# Patient Record
Sex: FEMALE | Race: WHITE | NOT HISPANIC OR LATINO | Employment: OTHER | URBAN - METROPOLITAN AREA
[De-identification: names, ages, dates, MRNs, and addresses within clinical notes are randomized per-mention and may not be internally consistent; named-entity substitution may affect disease eponyms.]

---

## 2018-05-07 ENCOUNTER — HOSPITAL ENCOUNTER (OUTPATIENT)
Dept: FAMILY MEDICINE CLINIC | Facility: CLINIC | Age: 63
Setting detail: SPECIMEN
Discharge: HOME | End: 2018-05-07
Attending: FAMILY MEDICINE | Admitting: FAMILY MEDICINE

## 2018-05-07 LAB
ALBUMIN SERPL-MCNC: 3.8 G/DL (ref 3.5–4.8)
ALBUMIN/GLOB SERPL: 1.6 {RATIO} (ref 1–1.7)
ALP SERPL-CCNC: 62 IU/L (ref 32–91)
ALT SERPL-CCNC: 21 IU/L (ref 14–54)
ANION GAP SERPL CALC-SCNC: 12.7 MMOL/L (ref 10–20)
AST SERPL-CCNC: 25 IU/L (ref 15–41)
BASOPHILS # BLD AUTO: 0 10*3/UL (ref 0–0.2)
BASOPHILS NFR BLD AUTO: 0 % (ref 0–2)
BILIRUB SERPL-MCNC: 0.4 MG/DL (ref 0.3–1.2)
BUN SERPL-MCNC: 9 MG/DL (ref 8–20)
BUN/CREAT SERPL: 15 (ref 5.4–26.2)
CALCIUM SERPL-MCNC: 8.9 MG/DL (ref 8.9–10.3)
CHLORIDE SERPL-SCNC: 103 MMOL/L (ref 101–111)
CHOLEST SERPL-MCNC: 227 MG/DL
CHOLEST/HDLC SERPL: 4.1 {RATIO}
CONV CO2: 26 MMOL/L (ref 22–32)
CONV LDL CHOLESTEROL DIRECT: 145 MG/DL (ref 0–100)
CONV TOTAL PROTEIN: 6.2 G/DL (ref 6.1–7.9)
CREAT UR-MCNC: 0.6 MG/DL (ref 0.4–1)
DIFFERENTIAL METHOD BLD: (no result)
EOSINOPHIL # BLD AUTO: 0.1 10*3/UL (ref 0–0.3)
EOSINOPHIL # BLD AUTO: 2 % (ref 0–3)
ERYTHROCYTE [DISTWIDTH] IN BLOOD BY AUTOMATED COUNT: 14.2 % (ref 11.5–14.5)
GLOBULIN UR ELPH-MCNC: 2.4 G/DL (ref 2.5–3.8)
GLUCOSE SERPL-MCNC: 96 MG/DL (ref 65–99)
HCT VFR BLD AUTO: 42.7 % (ref 35–49)
HDLC SERPL-MCNC: 56 MG/DL
HGB BLD-MCNC: 13.9 G/DL (ref 12–15)
LDLC/HDLC SERPL: 2.6 {RATIO}
LIPID INTERPRETATION: ABNORMAL
LYMPHOCYTES # BLD AUTO: 2.7 10*3/UL (ref 0.8–4.8)
LYMPHOCYTES NFR BLD AUTO: 33 % (ref 18–42)
MCH RBC QN AUTO: 28.9 PG (ref 26–32)
MCHC RBC AUTO-ENTMCNC: 32.6 G/DL (ref 32–36)
MCV RBC AUTO: 88.6 FL (ref 80–94)
MONOCYTES # BLD AUTO: 0.6 10*3/UL (ref 0.1–1.3)
MONOCYTES NFR BLD AUTO: 7 % (ref 2–11)
NEUTROPHILS # BLD AUTO: 4.7 10*3/UL (ref 2.3–8.6)
NEUTROPHILS NFR BLD AUTO: 58 % (ref 50–75)
NRBC BLD AUTO-RTO: 0 /100{WBCS}
NRBC/RBC NFR BLD MANUAL: 0 10*3/UL
PLATELET # BLD AUTO: 257 10*3/UL (ref 150–450)
PMV BLD AUTO: 10.3 FL (ref 7.4–10.4)
POTASSIUM SERPL-SCNC: 3.7 MMOL/L (ref 3.6–5.1)
RBC # BLD AUTO: 4.81 10*6/UL (ref 4–5.4)
SODIUM SERPL-SCNC: 138 MMOL/L (ref 136–144)
TRIGL SERPL-MCNC: 183 MG/DL
VLDLC SERPL CALC-MCNC: 26.7 MG/DL
WBC # BLD AUTO: 8.2 10*3/UL (ref 4.5–11.5)

## 2019-01-31 ENCOUNTER — HOSPITAL ENCOUNTER (OUTPATIENT)
Dept: FAMILY MEDICINE CLINIC | Facility: CLINIC | Age: 64
Setting detail: SPECIMEN
Discharge: HOME | End: 2019-01-31
Attending: FAMILY MEDICINE | Admitting: FAMILY MEDICINE

## 2019-01-31 LAB
BASOPHILS # BLD AUTO: 0.1 10*3/UL (ref 0–0.2)
BASOPHILS NFR BLD AUTO: 1 % (ref 0–2)
DIFFERENTIAL METHOD BLD: (no result)
EOSINOPHIL # BLD AUTO: 0.6 10*3/UL (ref 0–0.3)
EOSINOPHIL # BLD AUTO: 7 % (ref 0–3)
ERYTHROCYTE [DISTWIDTH] IN BLOOD BY AUTOMATED COUNT: 14.2 % (ref 11.5–14.5)
HCT VFR BLD AUTO: 37.8 % (ref 35–49)
HGB BLD-MCNC: 12.5 G/DL (ref 12–15)
LYMPHOCYTES # BLD AUTO: 1.9 10*3/UL (ref 0.8–4.8)
LYMPHOCYTES NFR BLD AUTO: 21 % (ref 18–42)
MCH RBC QN AUTO: 29.1 PG (ref 26–32)
MCHC RBC AUTO-ENTMCNC: 33.1 G/DL (ref 32–36)
MCV RBC AUTO: 87.9 FL (ref 80–94)
MONOCYTES # BLD AUTO: 0.6 10*3/UL (ref 0.1–1.3)
MONOCYTES NFR BLD AUTO: 7 % (ref 2–11)
NEUTROPHILS # BLD AUTO: 5.9 10*3/UL (ref 2.3–8.6)
NEUTROPHILS NFR BLD AUTO: 64 % (ref 50–75)
NRBC BLD AUTO-RTO: 0 /100{WBCS}
NRBC/RBC NFR BLD MANUAL: 0 10*3/UL
PLATELET # BLD AUTO: 332 10*3/UL (ref 150–450)
PMV BLD AUTO: 9.6 FL (ref 7.4–10.4)
RBC # BLD AUTO: 4.31 10*6/UL (ref 4–5.4)
WBC # BLD AUTO: 9.2 10*3/UL (ref 4.5–11.5)

## 2019-08-07 RX ORDER — LOVASTATIN 20 MG/1
TABLET ORAL
Qty: 180 TABLET | Refills: 0 | Status: SHIPPED | OUTPATIENT
Start: 2019-08-07 | End: 2021-01-22

## 2019-08-07 RX ORDER — LISINOPRIL AND HYDROCHLOROTHIAZIDE 20; 12.5 MG/1; MG/1
TABLET ORAL
Qty: 180 TABLET | Refills: 0 | Status: SHIPPED | OUTPATIENT
Start: 2019-08-07 | End: 2021-01-22

## 2019-08-19 ENCOUNTER — OFFICE VISIT (OUTPATIENT)
Dept: FAMILY MEDICINE CLINIC | Facility: CLINIC | Age: 64
End: 2019-08-19

## 2019-08-19 VITALS
TEMPERATURE: 98.1 F | HEIGHT: 64 IN | OXYGEN SATURATION: 97 % | DIASTOLIC BLOOD PRESSURE: 63 MMHG | HEART RATE: 87 BPM | BODY MASS INDEX: 24.75 KG/M2 | WEIGHT: 145 LBS | SYSTOLIC BLOOD PRESSURE: 98 MMHG

## 2019-08-19 DIAGNOSIS — J06.9 UPPER RESPIRATORY TRACT INFECTION, UNSPECIFIED TYPE: Primary | ICD-10-CM

## 2019-08-19 PROBLEM — G89.4 CHRONIC PAIN DISORDER: Status: ACTIVE | Noted: 2017-01-11

## 2019-08-19 PROBLEM — N62 MACROMASTIA: Status: ACTIVE | Noted: 2017-05-17

## 2019-08-19 PROBLEM — G47.00 INSOMNIA: Status: ACTIVE | Noted: 2017-01-11

## 2019-08-19 PROBLEM — M79.644 THUMB PAIN, RIGHT: Status: ACTIVE | Noted: 2017-01-11

## 2019-08-19 PROBLEM — K21.9 GASTROESOPHAGEAL REFLUX DISEASE: Status: ACTIVE | Noted: 2019-01-31

## 2019-08-19 PROCEDURE — 99213 OFFICE O/P EST LOW 20 MIN: CPT | Performed by: NURSE PRACTITIONER

## 2019-08-19 RX ORDER — SUCRALFATE 1 G/1
TABLET ORAL
COMMUNITY
Start: 2019-01-31 | End: 2021-01-22

## 2019-08-19 RX ORDER — FLUTICASONE PROPIONATE 50 MCG
2 SPRAY, SUSPENSION (ML) NASAL DAILY
Qty: 1 BOTTLE | Refills: 2 | Status: SHIPPED | OUTPATIENT
Start: 2019-08-19 | End: 2021-01-22

## 2019-08-19 RX ORDER — BENZONATATE 100 MG/1
100 CAPSULE ORAL 3 TIMES DAILY PRN
Qty: 30 CAPSULE | Refills: 0 | Status: SHIPPED | OUTPATIENT
Start: 2019-08-19 | End: 2021-01-22

## 2019-08-19 RX ORDER — OMEPRAZOLE 20 MG/1
CAPSULE, DELAYED RELEASE ORAL
COMMUNITY
Start: 2019-01-31 | End: 2021-01-22

## 2019-08-19 RX ORDER — MOMETASONE FUROATE 1 MG/G
CREAM TOPICAL
COMMUNITY
Start: 2019-01-31 | End: 2021-01-22

## 2019-08-19 NOTE — PATIENT INSTRUCTIONS
Most likely a viral upper respiratory infection   Tylenol or ibuprofen as needed for discomfort  Gargle with salt water  May take over the counter mucinex  Tessalon perles as needed for cough  Start using flonase nasal spray daily  Call for any issues or concerns  Call Felisha at [phone], option 3, then option 1 in 1-2 weeks with BP readings

## 2019-08-19 NOTE — PROGRESS NOTES
"Subjective   Katarina Phillips is a 64 y.o. female.     Pt is here today with c/o sore throat for 3-4 weeks. She states that it has been inflamed and has been very hard to swallow.  She reports that she watched her grand daughter this weekend and she was sick.  Pt reports that she has also been congested and has a severe cough.  The only thing that she has been taking is cough drops.  Her nose started running last night so she did try a decongestant.  Denies fever or chills.           The following portions of the patient's history were reviewed and updated as appropriate: allergies, current medications, past family history, past medical history, past social history, past surgical history and problem list.    Review of Systems   Constitutional: Positive for fatigue. Negative for chills and fever.   HENT: Positive for congestion, sore throat, swollen glands, trouble swallowing and voice change. Negative for ear pain and sinus pressure.    Respiratory: Positive for cough. Negative for chest tightness and shortness of breath.    Cardiovascular: Negative for chest pain and palpitations.   Gastrointestinal: Negative for abdominal pain, diarrhea, nausea and vomiting.   Neurological: Positive for dizziness. Negative for headache.       Objective   BP 98/63 (BP Location: Right arm, Patient Position: Sitting, Cuff Size: Adult)   Pulse 87   Temp 98.1 °F (36.7 °C) (Oral)   Ht 162.6 cm (64\")   Wt 65.8 kg (145 lb)   SpO2 97%   BMI 24.89 kg/m²   Physical Exam   Constitutional: She is oriented to person, place, and time. She appears well-developed and well-nourished.   HENT:   Head: Normocephalic and atraumatic.   Mouth/Throat: Posterior oropharyngeal erythema present. No oropharyngeal exudate or posterior oropharyngeal edema.   Hoarse voice   Eyes: EOM are normal. Pupils are equal, round, and reactive to light.   Neck: Normal range of motion. Neck supple.   Cardiovascular: Normal rate and regular rhythm. "   Pulmonary/Chest: Effort normal and breath sounds normal. No respiratory distress. She has no wheezes. She exhibits no tenderness.   Abdominal: Soft. Bowel sounds are normal.   Musculoskeletal: Normal range of motion.   Lymphadenopathy:     She has cervical adenopathy.   Neurological: She is alert and oriented to person, place, and time.   Skin: Skin is warm.   Psychiatric: She has a normal mood and affect. Her behavior is normal. Judgment and thought content normal.         Assessment/Plan   Problems Addressed this Visit     None      Visit Diagnoses     Upper respiratory tract infection, unspecified type    -  Primary    Most likely a viral upper respiratory infection   Gargle with salt water  mucinex  Tessalon perles  flonase      Relevant Medications    fluticasone (FLONASE) 50 MCG/ACT nasal spray    benzonatate (TESSALON PERLES) 100 MG capsule              Diagnoses and all orders for this visit:    1. Upper respiratory tract infection, unspecified type (Primary)  Comments:  Most likely a viral upper respiratory infection   Gargle with salt water  mucinex  Tessalon perles  flonase    Orders:  -     fluticasone (FLONASE) 50 MCG/ACT nasal spray; 2 sprays into the nostril(s) as directed by provider Daily.  Dispense: 1 bottle; Refill: 2  -     benzonatate (TESSALON PERLES) 100 MG capsule; Take 1 capsule by mouth 3 (Three) Times a Day As Needed for Cough.  Dispense: 30 capsule; Refill: 0

## 2019-08-23 ENCOUNTER — TELEPHONE (OUTPATIENT)
Dept: FAMILY MEDICINE CLINIC | Facility: CLINIC | Age: 64
End: 2019-08-23

## 2019-08-23 RX ORDER — AMOXICILLIN AND CLAVULANATE POTASSIUM 875; 125 MG/1; MG/1
1 TABLET, FILM COATED ORAL 2 TIMES DAILY
Qty: 20 TABLET | Refills: 0 | Status: SHIPPED | OUTPATIENT
Start: 2019-08-23 | End: 2019-09-02

## 2021-01-22 ENCOUNTER — LAB (OUTPATIENT)
Dept: FAMILY MEDICINE CLINIC | Facility: CLINIC | Age: 66
End: 2021-01-22

## 2021-01-22 ENCOUNTER — OFFICE VISIT (OUTPATIENT)
Dept: FAMILY MEDICINE CLINIC | Facility: CLINIC | Age: 66
End: 2021-01-22

## 2021-01-22 VITALS
HEIGHT: 64 IN | TEMPERATURE: 97.7 F | OXYGEN SATURATION: 98 % | SYSTOLIC BLOOD PRESSURE: 163 MMHG | BODY MASS INDEX: 27.31 KG/M2 | HEART RATE: 63 BPM | WEIGHT: 160 LBS | DIASTOLIC BLOOD PRESSURE: 72 MMHG

## 2021-01-22 DIAGNOSIS — E78.2 MIXED HYPERLIPIDEMIA: Primary | ICD-10-CM

## 2021-01-22 DIAGNOSIS — Z11.59 NEED FOR HEPATITIS C SCREENING TEST: ICD-10-CM

## 2021-01-22 DIAGNOSIS — R63.5 WEIGHT GAIN: ICD-10-CM

## 2021-01-22 DIAGNOSIS — F34.1 DYSTHYMIA: ICD-10-CM

## 2021-01-22 DIAGNOSIS — I10 ESSENTIAL HYPERTENSION: ICD-10-CM

## 2021-01-22 DIAGNOSIS — E78.2 MIXED HYPERLIPIDEMIA: ICD-10-CM

## 2021-01-22 LAB
ALBUMIN SERPL-MCNC: 3.9 G/DL (ref 3.5–5.2)
ALBUMIN/GLOB SERPL: 1.6 G/DL
ALP SERPL-CCNC: 81 U/L (ref 39–117)
ALT SERPL W P-5'-P-CCNC: 21 U/L (ref 1–33)
ANION GAP SERPL CALCULATED.3IONS-SCNC: 8.1 MMOL/L (ref 5–15)
AST SERPL-CCNC: 20 U/L (ref 1–32)
BASOPHILS # BLD AUTO: 0.07 10*3/MM3 (ref 0–0.2)
BASOPHILS NFR BLD AUTO: 1 % (ref 0–1.5)
BILIRUB SERPL-MCNC: <0.2 MG/DL (ref 0–1.2)
BUN SERPL-MCNC: 18 MG/DL (ref 8–23)
BUN/CREAT SERPL: 28.6 (ref 7–25)
CALCIUM SPEC-SCNC: 8.9 MG/DL (ref 8.6–10.5)
CHLORIDE SERPL-SCNC: 104 MMOL/L (ref 98–107)
CHOLEST SERPL-MCNC: 202 MG/DL (ref 0–200)
CO2 SERPL-SCNC: 25.9 MMOL/L (ref 22–29)
CREAT SERPL-MCNC: 0.63 MG/DL (ref 0.57–1)
DEPRECATED RDW RBC AUTO: 40.9 FL (ref 37–54)
EOSINOPHIL # BLD AUTO: 0.25 10*3/MM3 (ref 0–0.4)
EOSINOPHIL NFR BLD AUTO: 3.5 % (ref 0.3–6.2)
ERYTHROCYTE [DISTWIDTH] IN BLOOD BY AUTOMATED COUNT: 13.2 % (ref 12.3–15.4)
GFR SERPL CREATININE-BSD FRML MDRD: 95 ML/MIN/1.73
GLOBULIN UR ELPH-MCNC: 2.4 GM/DL
GLUCOSE SERPL-MCNC: 95 MG/DL (ref 65–99)
HCT VFR BLD AUTO: 41.5 % (ref 34–46.6)
HCV AB SER DONR QL: NORMAL
HDLC SERPL-MCNC: 69 MG/DL (ref 40–60)
HGB BLD-MCNC: 13.5 G/DL (ref 12–15.9)
IMM GRANULOCYTES # BLD AUTO: 0.02 10*3/MM3 (ref 0–0.05)
IMM GRANULOCYTES NFR BLD AUTO: 0.3 % (ref 0–0.5)
LDLC SERPL CALC-MCNC: 112 MG/DL (ref 0–100)
LDLC/HDLC SERPL: 1.58 {RATIO}
LYMPHOCYTES # BLD AUTO: 2.15 10*3/MM3 (ref 0.7–3.1)
LYMPHOCYTES NFR BLD AUTO: 29.9 % (ref 19.6–45.3)
MCH RBC QN AUTO: 28.1 PG (ref 26.6–33)
MCHC RBC AUTO-ENTMCNC: 32.5 G/DL (ref 31.5–35.7)
MCV RBC AUTO: 86.3 FL (ref 79–97)
MONOCYTES # BLD AUTO: 0.89 10*3/MM3 (ref 0.1–0.9)
MONOCYTES NFR BLD AUTO: 12.4 % (ref 5–12)
NEUTROPHILS NFR BLD AUTO: 3.81 10*3/MM3 (ref 1.7–7)
NEUTROPHILS NFR BLD AUTO: 52.9 % (ref 42.7–76)
NRBC BLD AUTO-RTO: 0 /100 WBC (ref 0–0.2)
PLATELET # BLD AUTO: 235 10*3/MM3 (ref 140–450)
PMV BLD AUTO: 12.2 FL (ref 6–12)
POTASSIUM SERPL-SCNC: 4.4 MMOL/L (ref 3.5–5.2)
PROT SERPL-MCNC: 6.3 G/DL (ref 6–8.5)
RBC # BLD AUTO: 4.81 10*6/MM3 (ref 3.77–5.28)
SODIUM SERPL-SCNC: 138 MMOL/L (ref 136–145)
TRIGL SERPL-MCNC: 119 MG/DL (ref 0–150)
TSH SERPL DL<=0.05 MIU/L-ACNC: 2.66 UIU/ML (ref 0.27–4.2)
VLDLC SERPL-MCNC: 21 MG/DL (ref 5–40)
WBC # BLD AUTO: 7.19 10*3/MM3 (ref 3.4–10.8)

## 2021-01-22 PROCEDURE — 84443 ASSAY THYROID STIM HORMONE: CPT | Performed by: FAMILY MEDICINE

## 2021-01-22 PROCEDURE — 86803 HEPATITIS C AB TEST: CPT | Performed by: FAMILY MEDICINE

## 2021-01-22 PROCEDURE — 80053 COMPREHEN METABOLIC PANEL: CPT | Performed by: FAMILY MEDICINE

## 2021-01-22 PROCEDURE — 85025 COMPLETE CBC W/AUTO DIFF WBC: CPT | Performed by: FAMILY MEDICINE

## 2021-01-22 PROCEDURE — 99214 OFFICE O/P EST MOD 30 MIN: CPT | Performed by: FAMILY MEDICINE

## 2021-01-22 PROCEDURE — 80061 LIPID PANEL: CPT | Performed by: FAMILY MEDICINE

## 2021-01-22 PROCEDURE — 36415 COLL VENOUS BLD VENIPUNCTURE: CPT | Performed by: FAMILY MEDICINE

## 2021-01-22 RX ORDER — MELATONIN
1000 DAILY
COMMUNITY
End: 2021-12-17 | Stop reason: SDUPTHER

## 2021-01-22 RX ORDER — LANOLIN ALCOHOL/MO/W.PET/CERES
1000 CREAM (GRAM) TOPICAL DAILY
COMMUNITY
End: 2021-12-17 | Stop reason: SDUPTHER

## 2021-01-22 RX ORDER — AMLODIPINE BESYLATE 5 MG/1
5 TABLET ORAL DAILY
Qty: 90 TABLET | Refills: 3 | Status: SHIPPED | OUTPATIENT
Start: 2021-01-22 | End: 2021-02-18

## 2021-01-22 RX ORDER — ESCITALOPRAM OXALATE 10 MG/1
10 TABLET ORAL DAILY
Qty: 90 TABLET | Refills: 3 | Status: SHIPPED | OUTPATIENT
Start: 2021-01-22 | End: 2021-02-09

## 2021-01-22 NOTE — PROGRESS NOTES
Subjective   Katarina Phillips is a 65 y.o. female.     Here for follow up on bp and chol  Went off meds a year ago as she did not think her meds were working  Ready to restart  bp is high at work  Now has custody of her great-granddaughter  Stressful  Has gained weight  Needs antidepressants  Prev on zestorectic for her bp  Prev on prozac and it worked  Denies SI/HI       The following portions of the patient's history were reviewed and updated as appropriate: allergies, current medications, past family history, past medical history, past social history, past surgical history and problem list.  Past Medical History:   Diagnosis Date   • Hyperthyroidism      Past Surgical History:   Procedure Laterality Date   • BACK SURGERY     • HYSTERECTOMY       History reviewed. No pertinent family history.  Social History     Socioeconomic History   • Marital status:      Spouse name: Not on file   • Number of children: Not on file   • Years of education: Not on file   • Highest education level: Not on file   Tobacco Use   • Smoking status: Never Smoker   • Smokeless tobacco: Never Used   Substance and Sexual Activity   • Alcohol use: Not Currently         Current Outpatient Medications:   •  cholecalciferol (VITAMIN D3) 25 MCG (1000 UT) tablet, Take 1,000 Units by mouth Daily., Disp: , Rfl:   •  vitamin B-12 (CYANOCOBALAMIN) 1000 MCG tablet, Take 1,000 mcg by mouth Daily., Disp: , Rfl:   •  amLODIPine (NORVASC) 5 MG tablet, Take 1 tablet by mouth Daily., Disp: 90 tablet, Rfl: 3  •  escitalopram (Lexapro) 10 MG tablet, Take 1 tablet by mouth Daily., Disp: 90 tablet, Rfl: 3    Review of Systems   Constitutional: Positive for unexpected weight gain. Negative for diaphoresis, fatigue, fever and unexpected weight loss.   Respiratory: Negative for cough, chest tightness and shortness of breath.    Cardiovascular: Negative for chest pain, palpitations and leg swelling.   Gastrointestinal: Negative for nausea and vomiting.  "  Neurological: Negative for dizziness, syncope and headache.   Psychiatric/Behavioral: Positive for sleep disturbance, depressed mood and stress. Negative for self-injury and suicidal ideas. The patient is nervous/anxious.      /72 (BP Location: Left arm, Patient Position: Sitting, Cuff Size: Adult)   Pulse 63   Temp 97.7 °F (36.5 °C) (Temporal)   Ht 162.6 cm (64\")   Wt 72.6 kg (160 lb)   SpO2 98%   Breastfeeding No   BMI 27.46 kg/m²       Objective   Physical Exam  Vitals signs and nursing note reviewed.   Constitutional:       General: She is not in acute distress.     Appearance: She is well-developed and overweight.   HENT:      Head: Normocephalic and atraumatic.   Neck:      Musculoskeletal: Neck supple.      Thyroid: No thyromegaly.   Cardiovascular:      Rate and Rhythm: Normal rate and regular rhythm.      Heart sounds: Normal heart sounds. No murmur. No friction rub. No gallop.    Pulmonary:      Effort: Pulmonary effort is normal. No respiratory distress.      Breath sounds: Normal breath sounds. No wheezing or rales.   Musculoskeletal:      Right lower leg: No edema.      Left lower leg: No edema.   Lymphadenopathy:      Cervical: No cervical adenopathy.   Skin:     General: Skin is warm and dry.   Neurological:      Mental Status: She is alert.   Psychiatric:         Mood and Affect: Affect is flat.           Assessment/Plan   Problems Addressed this Visit        Cardiac and Vasculature    Hyperlipidemia - Primary    Relevant Orders    Comprehensive Metabolic Panel (Completed)    Lipid Panel (Completed)    Hypertension    Relevant Medications    amLODIPine (NORVASC) 5 MG tablet    Other Relevant Orders    Comprehensive Metabolic Panel (Completed)    Lipid Panel (Completed)    TSH (Completed)    CBC & Differential (Completed)       Mental Health    Dysthymia    Relevant Medications    escitalopram (Lexapro) 10 MG tablet      Other Visit Diagnoses     Need for hepatitis C screening test     "    Relevant Orders    Hepatitis C Antibody (Completed)    Weight gain        Relevant Orders    TSH (Completed)    CBC & Differential (Completed)      Diagnoses       Codes Comments    Mixed hyperlipidemia    -  Primary ICD-10-CM: E78.2  ICD-9-CM: 272.2     Essential hypertension     ICD-10-CM: I10  ICD-9-CM: 401.9     Need for hepatitis C screening test     ICD-10-CM: Z11.59  ICD-9-CM: V73.89     Weight gain     ICD-10-CM: R63.5  ICD-9-CM: 783.1     Dysthymia     ICD-10-CM: F34.1  ICD-9-CM: 300.4         Labs ordered  Counseled on the need for weight loss and stress reduction  Will restart zestoretic for her bp  She did well on prozac in the past but wants to try something new so will start lexapro  Counseled on sleep hygeine and gave a h/o on insomnia  Will see her back in a few weeks to f/u bp

## 2021-01-22 NOTE — PATIENT INSTRUCTIONS
Keep working to lose weight through healthy eating and exercise.  Stress reduction  Insomnia  Insomnia is a sleep disorder that makes it difficult to fall asleep or stay asleep. Insomnia can cause fatigue, low energy, difficulty concentrating, mood swings, and poor performance at work or school.  There are three different ways to classify insomnia:  · Difficulty falling asleep.  · Difficulty staying asleep.  · Waking up too early in the morning.  Any type of insomnia can be long-term (chronic) or short-term (acute). Both are common. Short-term insomnia usually lasts for three months or less. Chronic insomnia occurs at least three times a week for longer than three months.  What are the causes?  Insomnia may be caused by another condition, situation, or substance, such as:  · Anxiety.  · Certain medicines.  · Gastroesophageal reflux disease (GERD) or other gastrointestinal conditions.  · Asthma or other breathing conditions.  · Restless legs syndrome, sleep apnea, or other sleep disorders.  · Chronic pain.  · Menopause.  · Stroke.  · Abuse of alcohol, tobacco, or illegal drugs.  · Mental health conditions, such as depression.  · Caffeine.  · Neurological disorders, such as Alzheimer's disease.  · An overactive thyroid (hyperthyroidism).  Sometimes, the cause of insomnia may not be known.  What increases the risk?  Risk factors for insomnia include:  · Gender. Women are affected more often than men.  · Age. Insomnia is more common as you get older.  · Stress.  · Lack of exercise.  · Irregular work schedule or working night shifts.  · Traveling between different time zones.  · Certain medical and mental health conditions.  What are the signs or symptoms?  If you have insomnia, the main symptom is having trouble falling asleep or having trouble staying asleep. This may lead to other symptoms, such as:  · Feeling fatigued or having low energy.  · Feeling nervous about going to sleep.  · Not feeling rested in the  morning.  · Having trouble concentrating.  · Feeling irritable, anxious, or depressed.  How is this diagnosed?  This condition may be diagnosed based on:  · Your symptoms and medical history. Your health care provider may ask about:  ? Your sleep habits.  ? Any medical conditions you have.  ? Your mental health.  · A physical exam.  How is this treated?  Treatment for insomnia depends on the cause. Treatment may focus on treating an underlying condition that is causing insomnia. Treatment may also include:  · Medicines to help you sleep.  · Counseling or therapy.  · Lifestyle adjustments to help you sleep better.  Follow these instructions at home:  Eating and drinking    · Limit or avoid alcohol, caffeinated beverages, and cigarettes, especially close to bedtime. These can disrupt your sleep.  · Do not eat a large meal or eat spicy foods right before bedtime. This can lead to digestive discomfort that can make it hard for you to sleep.  Sleep habits    · Keep a sleep diary to help you and your health care provider figure out what could be causing your insomnia. Write down:  ? When you sleep.  ? When you wake up during the night.  ? How well you sleep.  ? How rested you feel the next day.  ? Any side effects of medicines you are taking.  ? What you eat and drink.  · Make your bedroom a dark, comfortable place where it is easy to fall asleep.  ? Put up shades or blackout curtains to block light from outside.  ? Use a white noise machine to block noise.  ? Keep the temperature cool.  · Limit screen use before bedtime. This includes:  ? Watching TV.  ? Using your smartphone, tablet, or computer.  · Stick to a routine that includes going to bed and waking up at the same times every day and night. This can help you fall asleep faster. Consider making a quiet activity, such as reading, part of your nighttime routine.  · Try to avoid taking naps during the day so that you sleep better at night.  · Get out of bed if you are  still awake after 15 minutes of trying to sleep. Keep the lights down, but try reading or doing a quiet activity. When you feel sleepy, go back to bed.  General instructions  · Take over-the-counter and prescription medicines only as told by your health care provider.  · Exercise regularly, as told by your health care provider. Avoid exercise starting several hours before bedtime.  · Use relaxation techniques to manage stress. Ask your health care provider to suggest some techniques that may work well for you. These may include:  ? Breathing exercises.  ? Routines to release muscle tension.  ? Visualizing peaceful scenes.  · Make sure that you drive carefully. Avoid driving if you feel very sleepy.  · Keep all follow-up visits as told by your health care provider. This is important.  Contact a health care provider if:  · You are tired throughout the day.  · You have trouble in your daily routine due to sleepiness.  · You continue to have sleep problems, or your sleep problems get worse.  Get help right away if:  · You have serious thoughts about hurting yourself or someone else.  If you ever feel like you may hurt yourself or others, or have thoughts about taking your own life, get help right away. You can go to your nearest emergency department or call:  · Your local emergency services (911 in the [place]  · A suicide crisis helpline, such as the National Suicide Prevention Lifeline at 1-860.479.6122. This is open 24 hours a day.  Summary  · Insomnia is a sleep disorder that makes it difficult to fall asleep or stay asleep.  · Insomnia can be long-term (chronic) or short-term (acute).  · Treatment for insomnia depends on the cause. Treatment may focus on treating an underlying condition that is causing insomnia.  · Keep a sleep diary to help you and your health care provider figure out what could be causing your insomnia.  This information is not intended to replace advice given to you by your health care provider.  Make sure you discuss any questions you have with your health care provider.  Document Revised: 11/30/2018 Document Reviewed: 09/27/2018  Elsevier Patient Education © 2020 [employer]

## 2021-01-24 PROBLEM — F34.1 DYSTHYMIA: Status: ACTIVE | Noted: 2021-01-24

## 2021-02-09 ENCOUNTER — TELEPHONE (OUTPATIENT)
Dept: FAMILY MEDICINE CLINIC | Facility: CLINIC | Age: 66
End: 2021-02-09

## 2021-02-09 RX ORDER — FLUOXETINE HYDROCHLORIDE 20 MG/1
20 CAPSULE ORAL DAILY
Qty: 90 CAPSULE | Refills: 1 | Status: SHIPPED | OUTPATIENT
Start: 2021-02-09 | End: 2021-02-18

## 2021-02-09 NOTE — TELEPHONE ENCOUNTER
Stop the escitalopram  I will send a rx for the prozac  Continue the current dose of bp meds until her appt

## 2021-02-18 ENCOUNTER — OFFICE VISIT (OUTPATIENT)
Dept: FAMILY MEDICINE CLINIC | Facility: CLINIC | Age: 66
End: 2021-02-18

## 2021-02-18 VITALS
HEIGHT: 64 IN | TEMPERATURE: 97.1 F | WEIGHT: 159 LBS | DIASTOLIC BLOOD PRESSURE: 84 MMHG | SYSTOLIC BLOOD PRESSURE: 142 MMHG | BODY MASS INDEX: 27.14 KG/M2 | OXYGEN SATURATION: 97 % | HEART RATE: 68 BPM

## 2021-02-18 DIAGNOSIS — I10 ESSENTIAL HYPERTENSION: Primary | ICD-10-CM

## 2021-02-18 DIAGNOSIS — R23.4 SKIN FISSURE: ICD-10-CM

## 2021-02-18 DIAGNOSIS — F34.1 DYSTHYMIA: ICD-10-CM

## 2021-02-18 PROCEDURE — 99213 OFFICE O/P EST LOW 20 MIN: CPT | Performed by: FAMILY MEDICINE

## 2021-02-18 RX ORDER — AMLODIPINE BESYLATE 5 MG/1
10 TABLET ORAL DAILY
Qty: 90 TABLET | Refills: 3
Start: 2021-02-18 | End: 2021-03-22 | Stop reason: SDUPTHER

## 2021-02-18 RX ORDER — FLUOXETINE HYDROCHLORIDE 20 MG/1
40 CAPSULE ORAL DAILY
Qty: 90 CAPSULE | Refills: 1
Start: 2021-02-18 | End: 2021-03-24 | Stop reason: SDUPTHER

## 2021-02-18 NOTE — PATIENT INSTRUCTIONS
Keep working to lose weight through healthy eating and exercise.  Neosporin to finger at night and cover with bandaid  Take 2 prozac daily and 2 amlodipine daily

## 2021-02-18 NOTE — PROGRESS NOTES
Subjective   Katarina Phillips is a 65 y.o. female.     She comes in today for follow-up on her blood pressure and depression  She was seen a month ago and restarted on antihypertensives after she had stopped them about a year ago.  She had previously been on lisinopril but did not feel like it was working so she was started on amlodipine 5 mg  She previously been on Prozac for her depression but wanted to try something new so she was started on prozac  bp is still running high at work  Doing better on the prozac but feels there is room for improvement       The following portions of the patient's history were reviewed and updated as appropriate: allergies, current medications, past family history, past medical history, past social history, past surgical history and problem list.  Past Medical History:   Diagnosis Date   • Hyperthyroidism      Past Surgical History:   Procedure Laterality Date   • BACK SURGERY     • HYSTERECTOMY       History reviewed. No pertinent family history.  Social History     Socioeconomic History   • Marital status:      Spouse name: Not on file   • Number of children: Not on file   • Years of education: Not on file   • Highest education level: Not on file   Tobacco Use   • Smoking status: Never Smoker   • Smokeless tobacco: Never Used   Substance and Sexual Activity   • Alcohol use: Not Currently         Current Outpatient Medications:   •  amLODIPine (NORVASC) 5 MG tablet, Take 1 tablet by mouth Daily., Disp: 90 tablet, Rfl: 3  •  cholecalciferol (VITAMIN D3) 25 MCG (1000 UT) tablet, Take 1,000 Units by mouth Daily., Disp: , Rfl:   •  FLUoxetine (PROzac) 20 MG capsule, Take 1 capsule by mouth Daily., Disp: 90 capsule, Rfl: 1  •  vitamin B-12 (CYANOCOBALAMIN) 1000 MCG tablet, Take 1,000 mcg by mouth Daily., Disp: , Rfl:     Review of Systems   Constitutional: Negative for diaphoresis, fatigue, fever, unexpected weight gain and unexpected weight loss.   Respiratory: Negative for  "cough, chest tightness and shortness of breath.    Cardiovascular: Negative for chest pain, palpitations and leg swelling.   Gastrointestinal: Negative for nausea and vomiting.   Neurological: Negative for dizziness, syncope and headache.   Psychiatric/Behavioral: Positive for depressed mood. Negative for self-injury and suicidal ideas.     /84   Pulse 68   Temp 97.1 °F (36.2 °C) (Temporal)   Ht 162.6 cm (64\")   Wt 72.1 kg (159 lb)   SpO2 97%   BMI 27.29 kg/m²       Objective   Physical Exam  Vitals signs and nursing note reviewed.   Constitutional:       General: She is not in acute distress.     Appearance: Normal appearance. She is well-developed, well-groomed and overweight.   HENT:      Head: Normocephalic and atraumatic.   Neck:      Musculoskeletal: Neck supple.      Thyroid: No thyromegaly.   Cardiovascular:      Rate and Rhythm: Normal rate and regular rhythm.      Heart sounds: Normal heart sounds. No murmur. No friction rub. No gallop.    Pulmonary:      Effort: Pulmonary effort is normal. No respiratory distress.      Breath sounds: Normal breath sounds. No wheezing or rales.   Musculoskeletal:      Right lower leg: No edema.      Left lower leg: No edema.   Lymphadenopathy:      Cervical: No cervical adenopathy.   Skin:     General: Skin is warm and dry.      Comments: Small fissure that is irritated along the tip of her left thumb   Neurological:      Mental Status: She is alert.   Psychiatric:         Mood and Affect: Mood normal.         Behavior: Behavior is cooperative.           Assessment/Plan   Problems Addressed this Visit        Cardiac and Vasculature    Hypertension - Primary       Mental Health    Dysthymia      Other Visit Diagnoses     Skin fissure          Diagnoses       Codes Comments    Essential hypertension    -  Primary ICD-10-CM: I10  ICD-9-CM: 401.9     Dysthymia     ICD-10-CM: F34.1  ICD-9-CM: 300.4     Skin fissure     ICD-10-CM: R23.4  ICD-9-CM: 709.8         Blood " pressure still not adequately controlled so we will increase her dose to 10 mg and she will start taking 2 pills of the 5 mg dose at home  The dysthymia has improved but still has room for more improvement so I will increase her Prozac dose to 40 mg so she will start taking 2 of the 20 mg dose at home  She will apply Neosporin and a Band-Aid to her fissure on her thumb every night

## 2021-03-22 RX ORDER — AMLODIPINE BESYLATE 10 MG/1
10 TABLET ORAL DAILY
Qty: 90 TABLET | Refills: 1
Start: 2021-03-22 | End: 2021-03-24 | Stop reason: SDUPTHER

## 2021-03-23 NOTE — TELEPHONE ENCOUNTER
I refilled her amlodipine  I changed the dose to 10mg so she will only have to take one pill a day

## 2021-03-24 ENCOUNTER — TELEPHONE (OUTPATIENT)
Dept: FAMILY MEDICINE CLINIC | Facility: CLINIC | Age: 66
End: 2021-03-24

## 2021-03-24 RX ORDER — FLUOXETINE HYDROCHLORIDE 20 MG/1
40 CAPSULE ORAL DAILY
Qty: 90 CAPSULE | Refills: 1
Start: 2021-03-24 | End: 2021-05-18

## 2021-03-24 RX ORDER — AMLODIPINE BESYLATE 10 MG/1
10 TABLET ORAL DAILY
Qty: 90 TABLET | Refills: 1
Start: 2021-03-24 | End: 2021-03-26 | Stop reason: SDUPTHER

## 2021-03-24 NOTE — TELEPHONE ENCOUNTER
Caller: Katarina Phillips    Relationship: Self    Best call back number: [phone]    Medication needed:   Requested Prescriptions     Pending Prescriptions Disp Refills   • amLODIPine (NORVASC) 10 MG tablet 90 tablet 1     Sig: Take 1 tablet by mouth Daily.   • FLUoxetine (PROzac) 20 MG capsule 90 capsule 1     Sig: Take 2 capsules by mouth Daily.       When do you need the refill by: ASAP    What additional details did the patient provide when requesting the medication: PATIENT IS OUT OF MEDICATION. PATIENT ALSO SAID THAT DR. OLIVA SAID THAT SHE IS NOW TO TAKE EACH MEDICATION TWICE DAILY    Does the patient have less than a 3 day supply:  [x] Yes  [] No    What is the patient's preferred pharmacy: [facility] [place] [place] [facility] [address]  - [phone] [place] [phone] FX

## 2021-03-26 RX ORDER — AMLODIPINE BESYLATE 10 MG/1
10 TABLET ORAL DAILY
Qty: 90 TABLET | Refills: 1 | Status: SHIPPED | OUTPATIENT
Start: 2021-03-26 | End: 2021-04-16 | Stop reason: HOSPADM

## 2021-04-13 ENCOUNTER — APPOINTMENT (OUTPATIENT)
Dept: GENERAL RADIOLOGY | Facility: HOSPITAL | Age: 66
End: 2021-04-13

## 2021-04-13 ENCOUNTER — TELEPHONE (OUTPATIENT)
Dept: FAMILY MEDICINE CLINIC | Facility: CLINIC | Age: 66
End: 2021-04-13

## 2021-04-13 ENCOUNTER — HOSPITAL ENCOUNTER (INPATIENT)
Facility: HOSPITAL | Age: 66
LOS: 2 days | Discharge: HOME | End: 2021-04-16
Attending: FAMILY MEDICINE | Admitting: FAMILY MEDICINE

## 2021-04-13 DIAGNOSIS — R00.2 PALPITATIONS: ICD-10-CM

## 2021-04-13 DIAGNOSIS — I48.91 NEW ONSET A-FIB (HCC): Primary | ICD-10-CM

## 2021-04-13 DIAGNOSIS — I48.92 ATRIAL FLUTTER WITH RAPID VENTRICULAR RESPONSE (HCC): ICD-10-CM

## 2021-04-13 LAB
ALBUMIN SERPL-MCNC: 4.1 G/DL (ref 3.5–5.2)
ALBUMIN/GLOB SERPL: 1.5 G/DL
ALP SERPL-CCNC: 87 U/L (ref 39–117)
ALT SERPL W P-5'-P-CCNC: 20 U/L (ref 1–33)
ANION GAP SERPL CALCULATED.3IONS-SCNC: 11 MMOL/L (ref 5–15)
AST SERPL-CCNC: 20 U/L (ref 1–32)
BASOPHILS # BLD AUTO: 0 10*3/MM3 (ref 0–0.2)
BASOPHILS NFR BLD AUTO: 0.4 % (ref 0–1.5)
BILIRUB SERPL-MCNC: 0.2 MG/DL (ref 0–1.2)
BUN SERPL-MCNC: 14 MG/DL (ref 8–23)
BUN/CREAT SERPL: 17.9 (ref 7–25)
CALCIUM SPEC-SCNC: 9 MG/DL (ref 8.6–10.5)
CHLORIDE SERPL-SCNC: 101 MMOL/L (ref 98–107)
CO2 SERPL-SCNC: 27 MMOL/L (ref 22–29)
CREAT SERPL-MCNC: 0.78 MG/DL (ref 0.57–1)
DEPRECATED RDW RBC AUTO: 42.9 FL (ref 37–54)
EOSINOPHIL # BLD AUTO: 0.1 10*3/MM3 (ref 0–0.4)
EOSINOPHIL NFR BLD AUTO: 1 % (ref 0.3–6.2)
ERYTHROCYTE [DISTWIDTH] IN BLOOD BY AUTOMATED COUNT: 14.2 % (ref 12.3–15.4)
GFR SERPL CREATININE-BSD FRML MDRD: 74 ML/MIN/1.73
GLOBULIN UR ELPH-MCNC: 2.7 GM/DL
GLUCOSE SERPL-MCNC: 105 MG/DL (ref 65–99)
HCT VFR BLD AUTO: 43.9 % (ref 34–46.6)
HGB BLD-MCNC: 14.7 G/DL (ref 12–15.9)
HOLD SPECIMEN: NORMAL
LIPASE SERPL-CCNC: 28 U/L (ref 13–60)
LYMPHOCYTES # BLD AUTO: 1.6 10*3/MM3 (ref 0.7–3.1)
LYMPHOCYTES NFR BLD AUTO: 24.6 % (ref 19.6–45.3)
MCH RBC QN AUTO: 28.5 PG (ref 26.6–33)
MCHC RBC AUTO-ENTMCNC: 33.5 G/DL (ref 31.5–35.7)
MCV RBC AUTO: 85.1 FL (ref 79–97)
MONOCYTES # BLD AUTO: 0.6 10*3/MM3 (ref 0.1–0.9)
MONOCYTES NFR BLD AUTO: 9.1 % (ref 5–12)
NEUTROPHILS NFR BLD AUTO: 4.3 10*3/MM3 (ref 1.7–7)
NEUTROPHILS NFR BLD AUTO: 64.9 % (ref 42.7–76)
NRBC BLD AUTO-RTO: 0 /100 WBC (ref 0–0.2)
NT-PROBNP SERPL-MCNC: 1508 PG/ML (ref 0–900)
PLATELET # BLD AUTO: 267 10*3/MM3 (ref 140–450)
PMV BLD AUTO: 9.9 FL (ref 6–12)
POTASSIUM SERPL-SCNC: 3.3 MMOL/L (ref 3.5–5.2)
PROT SERPL-MCNC: 6.8 G/DL (ref 6–8.5)
QT INTERVAL: 311 MS
QT INTERVAL: 331 MS
QT INTERVAL: 342 MS
RBC # BLD AUTO: 5.16 10*6/MM3 (ref 3.77–5.28)
SARS-COV-2 RNA PNL SPEC NAA+PROBE: NORMAL
SODIUM SERPL-SCNC: 139 MMOL/L (ref 136–145)
TROPONIN T SERPL-MCNC: <0.01 NG/ML (ref 0–0.03)
TROPONIN T SERPL-MCNC: <0.01 NG/ML (ref 0–0.03)
WBC # BLD AUTO: 6.7 10*3/MM3 (ref 3.4–10.8)
WHOLE BLOOD HOLD SPECIMEN: NORMAL

## 2021-04-13 PROCEDURE — G0378 HOSPITAL OBSERVATION PER HR: HCPCS

## 2021-04-13 PROCEDURE — 93005 ELECTROCARDIOGRAM TRACING: CPT | Performed by: FAMILY MEDICINE

## 2021-04-13 PROCEDURE — 83690 ASSAY OF LIPASE: CPT | Performed by: NURSE PRACTITIONER

## 2021-04-13 PROCEDURE — 85025 COMPLETE CBC W/AUTO DIFF WBC: CPT | Performed by: NURSE PRACTITIONER

## 2021-04-13 PROCEDURE — 84484 ASSAY OF TROPONIN QUANT: CPT | Performed by: NURSE PRACTITIONER

## 2021-04-13 PROCEDURE — 93005 ELECTROCARDIOGRAM TRACING: CPT | Performed by: EMERGENCY MEDICINE

## 2021-04-13 PROCEDURE — 25010000002 ADENOSINE PER 6 MG: Performed by: NURSE PRACTITIONER

## 2021-04-13 PROCEDURE — 71045 X-RAY EXAM CHEST 1 VIEW: CPT

## 2021-04-13 PROCEDURE — 99285 EMERGENCY DEPT VISIT HI MDM: CPT

## 2021-04-13 PROCEDURE — 93005 ELECTROCARDIOGRAM TRACING: CPT

## 2021-04-13 PROCEDURE — 93005 ELECTROCARDIOGRAM TRACING: CPT | Performed by: NURSE PRACTITIONER

## 2021-04-13 PROCEDURE — 83880 ASSAY OF NATRIURETIC PEPTIDE: CPT | Performed by: NURSE PRACTITIONER

## 2021-04-13 PROCEDURE — 87635 SARS-COV-2 COVID-19 AMP PRB: CPT | Performed by: NURSE PRACTITIONER

## 2021-04-13 PROCEDURE — 99220 PR INITIAL OBSERVATION CARE/DAY 70 MINUTES: CPT | Performed by: NURSE PRACTITIONER

## 2021-04-13 PROCEDURE — 80053 COMPREHEN METABOLIC PANEL: CPT | Performed by: NURSE PRACTITIONER

## 2021-04-13 RX ORDER — METOPROLOL TARTRATE 5 MG/5ML
INJECTION INTRAVENOUS
Status: DISPENSED
Start: 2021-04-13 | End: 2021-04-14

## 2021-04-13 RX ORDER — DILTIAZEM HYDROCHLORIDE 5 MG/ML
10 INJECTION INTRAVENOUS ONCE
Status: COMPLETED | OUTPATIENT
Start: 2021-04-13 | End: 2021-04-13

## 2021-04-13 RX ORDER — ONDANSETRON 2 MG/ML
4 INJECTION INTRAMUSCULAR; INTRAVENOUS EVERY 6 HOURS PRN
Status: DISCONTINUED | OUTPATIENT
Start: 2021-04-13 | End: 2021-04-14

## 2021-04-13 RX ORDER — ACETAMINOPHEN 650 MG/1
650 SUPPOSITORY RECTAL EVERY 4 HOURS PRN
Status: DISCONTINUED | OUTPATIENT
Start: 2021-04-13 | End: 2021-04-16 | Stop reason: HOSPADM

## 2021-04-13 RX ORDER — LANOLIN ALCOHOL/MO/W.PET/CERES
1000 CREAM (GRAM) TOPICAL DAILY
Status: DISCONTINUED | OUTPATIENT
Start: 2021-04-14 | End: 2021-04-16 | Stop reason: HOSPADM

## 2021-04-13 RX ORDER — SODIUM CHLORIDE 0.9 % (FLUSH) 0.9 %
10 SYRINGE (ML) INJECTION AS NEEDED
Status: DISCONTINUED | OUTPATIENT
Start: 2021-04-13 | End: 2021-04-16 | Stop reason: HOSPADM

## 2021-04-13 RX ORDER — MELATONIN
1000 DAILY
Status: DISCONTINUED | OUTPATIENT
Start: 2021-04-14 | End: 2021-04-16 | Stop reason: HOSPADM

## 2021-04-13 RX ORDER — ACETAMINOPHEN 160 MG/5ML
650 SOLUTION ORAL EVERY 4 HOURS PRN
Status: DISCONTINUED | OUTPATIENT
Start: 2021-04-13 | End: 2021-04-16 | Stop reason: HOSPADM

## 2021-04-13 RX ORDER — ADENOSINE 3 MG/ML
INJECTION, SOLUTION INTRAVENOUS
Status: DISPENSED
Start: 2021-04-13 | End: 2021-04-14

## 2021-04-13 RX ORDER — POTASSIUM CHLORIDE 20 MEQ/1
40 TABLET, EXTENDED RELEASE ORAL AS NEEDED
Status: DISCONTINUED | OUTPATIENT
Start: 2021-04-13 | End: 2021-04-16 | Stop reason: HOSPADM

## 2021-04-13 RX ORDER — METOPROLOL TARTRATE 5 MG/5ML
5 INJECTION INTRAVENOUS ONCE
Status: COMPLETED | OUTPATIENT
Start: 2021-04-13 | End: 2021-04-13

## 2021-04-13 RX ORDER — POTASSIUM CHLORIDE 1.5 G/1.77G
40 POWDER, FOR SOLUTION ORAL AS NEEDED
Status: DISCONTINUED | OUTPATIENT
Start: 2021-04-13 | End: 2021-04-16 | Stop reason: HOSPADM

## 2021-04-13 RX ORDER — ADENOSINE 3 MG/ML
INJECTION, SOLUTION INTRAVENOUS
Status: COMPLETED | OUTPATIENT
Start: 2021-04-13 | End: 2021-04-13

## 2021-04-13 RX ORDER — SODIUM CHLORIDE 0.9 % (FLUSH) 0.9 %
10 SYRINGE (ML) INJECTION EVERY 12 HOURS SCHEDULED
Status: DISCONTINUED | OUTPATIENT
Start: 2021-04-13 | End: 2021-04-16 | Stop reason: HOSPADM

## 2021-04-13 RX ORDER — ADENOSINE 3 MG/ML
6 INJECTION, SOLUTION INTRAVENOUS ONCE
Status: DISCONTINUED | OUTPATIENT
Start: 2021-04-13 | End: 2021-04-14

## 2021-04-13 RX ORDER — ONDANSETRON 4 MG/1
4 TABLET, FILM COATED ORAL EVERY 6 HOURS PRN
Status: DISCONTINUED | OUTPATIENT
Start: 2021-04-13 | End: 2021-04-16 | Stop reason: HOSPADM

## 2021-04-13 RX ORDER — AMLODIPINE BESYLATE 5 MG/1
10 TABLET ORAL DAILY
Status: DISCONTINUED | OUTPATIENT
Start: 2021-04-14 | End: 2021-04-14

## 2021-04-13 RX ORDER — FLUOXETINE HYDROCHLORIDE 20 MG/1
40 CAPSULE ORAL DAILY
Status: DISCONTINUED | OUTPATIENT
Start: 2021-04-14 | End: 2021-04-16 | Stop reason: HOSPADM

## 2021-04-13 RX ORDER — ACETAMINOPHEN 325 MG/1
650 TABLET ORAL EVERY 4 HOURS PRN
Status: DISCONTINUED | OUTPATIENT
Start: 2021-04-13 | End: 2021-04-16 | Stop reason: HOSPADM

## 2021-04-13 RX ADMIN — ADENOSINE 12 MG: 3 INJECTION INTRAVENOUS at 18:30

## 2021-04-13 RX ADMIN — ADENOSINE 6 MG: 3 INJECTION INTRAVENOUS at 18:26

## 2021-04-13 RX ADMIN — DILTIAZEM HYDROCHLORIDE 10 MG: 5 INJECTION INTRAVENOUS at 18:43

## 2021-04-13 RX ADMIN — METOPROLOL TARTRATE 5 MG: 5 INJECTION INTRAVENOUS at 23:04

## 2021-04-13 RX ADMIN — SODIUM CHLORIDE 5 MG/HR: 900 INJECTION, SOLUTION INTRAVENOUS at 18:43

## 2021-04-13 RX ADMIN — SODIUM CHLORIDE 500 ML: 9 INJECTION, SOLUTION INTRAVENOUS at 23:04

## 2021-04-13 NOTE — TELEPHONE ENCOUNTER
PATIENT STATES THAT SHE WAS SEEN AT  [facility] FOR THE SYMPTOMS THAT SHE IS SCHEDULED TO SEE DR OLIVA ON 04/28/21 FOR: COLON DISCHARGE, MOSTLY CLEAR, OCCASIONAL BLOOD, BLOATED.    SHE STATES THAT WHILE THERE SHE WAS ADVISED TO GO TO THE ED TO HAVE HER HEART LOOKED AT AS WELL.  SHE STATES SHE REFUSED.    PATIENT IS ASKING THAT DR OLIVA REVIEW THE NOTES FROM [facility] AND ORDER THE TEST(S) THAT WERE RECOMMENDED BY [facility]RGENT [facility].  SHE STATES THAT FINANCIALLY SHE CAN NOT AFFORD AN ED VISIT.    PLEASE ADVISE    PATIENT CAN BE REACHED AT  [phone]    PLEASE LEAVE A MESSAGE IF NO ANSWER

## 2021-04-13 NOTE — ED PROVIDER NOTES
Subjective   Is a 66-year-old female who states she only takes medication for depression and blood pressure.  She states that on Saturday she began having some abdominal discomfort and had profuse vomiting all day she states Sunday she recuperated she drank a lot of water but she continued to have some belly pain she states today she had epigastric pain and she went to urgent care it was noted at urgent care that her heart rate was elevated in the 150s in the 160s and an EKG was performed and she was sent to the emergency room for further evaluation.  Upon initial EKG it was noted that the patient's heart rate was 160-170 and the patient states she feels like she has palpitations as well.  She denies any shortness of breath at this time she is alert oriented nontoxic in no acute distress she denies having a cardiac history denies ever having a stroke or heart attack.-Patient rated her pain a 6/10 upon arrival to the emergency room.  She really describes it as epigastric discomfort.          Review of Systems   Constitutional: Negative for chills, fatigue and fever.   HENT: Negative for congestion, tinnitus and trouble swallowing.    Eyes: Negative for photophobia, discharge and redness.   Respiratory: Positive for chest tightness. Negative for cough and shortness of breath.    Cardiovascular: Positive for palpitations. Negative for chest pain.   Gastrointestinal: Positive for abdominal pain. Negative for diarrhea, nausea and vomiting.   Genitourinary: Negative for dysuria, frequency and urgency.   Musculoskeletal: Negative for back pain, joint swelling and myalgias.   Skin: Negative for rash.   Neurological: Negative for dizziness and headaches.   Psychiatric/Behavioral: Negative for confusion.   All other systems reviewed and are negative.      Past Medical History:   Diagnosis Date   • Hyperthyroidism        Allergies   Allergen Reactions   • Doxycycline Hives   • Sulfa Antibiotics Hives       Past Surgical  History:   Procedure Laterality Date   • BACK SURGERY     • HYSTERECTOMY         No family history on file.    Social History     Socioeconomic History   • Marital status:      Spouse name: Not on file   • Number of children: Not on file   • Years of education: Not on file   • Highest education level: Not on file   Tobacco Use   • Smoking status: Never Smoker   • Smokeless tobacco: Never Used   Substance and Sexual Activity   • Alcohol use: Not Currently           Objective   Physical Exam  Vitals reviewed.   Constitutional:       Appearance: Normal appearance. She is well-developed.   HENT:      Head: Normocephalic and atraumatic.      Right Ear: External ear normal.      Left Ear: External ear normal.      Nose: Nose normal.      Mouth/Throat:      Pharynx: Oropharynx is clear.   Eyes:      Conjunctiva/sclera: Conjunctivae normal.      Pupils: Pupils are equal, round, and reactive to light.   Cardiovascular:      Rate and Rhythm: Regular rhythm. Tachycardia present.      Pulses: Normal pulses.      Heart sounds: Normal heart sounds.   Pulmonary:      Effort: Pulmonary effort is normal. No respiratory distress.      Breath sounds: Normal breath sounds. No wheezing.   Abdominal:      General: Bowel sounds are normal. There is no distension.      Palpations: Abdomen is soft. There is no mass.      Tenderness: There is no abdominal tenderness. There is no guarding or rebound.   Musculoskeletal:         General: No deformity. Normal range of motion.      Cervical back: Normal range of motion and neck supple.   Skin:     General: Skin is warm and dry.      Capillary Refill: Capillary refill takes 2 to 3 seconds.   Neurological:      General: No focal deficit present.      Mental Status: She is alert and oriented to person, place, and time. Mental status is at baseline.      GCS: GCS eye subscore is 4. GCS verbal subscore is 5. GCS motor subscore is 6.      Cranial Nerves: No cranial nerve deficit.      Sensory:  "No sensory deficit.      Deep Tendon Reflexes: Reflexes normal.   Psychiatric:         Mood and Affect: Mood normal.         Behavior: Behavior normal.         Thought Content: Thought content normal.         Judgment: Judgment normal.         Procedures       1st [id number]- showes  Rate 162 SVT was read by Dr. Shah.   2sd-1828 Showes Rate 149 and A. Fib   3rd 1859 Rate 72 NSR no ectopy no ST elevation.      ED Course      BP 98/58   Pulse (!) 156   Temp 97.9 °F (36.6 °C) (Oral)   Resp 16   Ht 162.6 cm (64\")   Wt 72 kg (158 lb 11.7 oz)   SpO2 98%   Breastfeeding No   BMI 27.25 kg/m²   Labs Reviewed   COMPREHENSIVE METABOLIC PANEL - Abnormal; Notable for the following components:       Result Value    Glucose 105 (*)     Potassium 3.3 (*)     All other components within normal limits    Narrative:     GFR Normal >60  Chronic Kidney Disease <60  Kidney Failure <15     BNP (IN-HOUSE) - Abnormal; Notable for the following components:    proBNP 1,508.0 (*)     All other components within normal limits    Narrative:     Among patients with dyspnea, NT-proBNP is highly sensitive for the detection of acute congestive heart failure. In addition NT-proBNP of <300 pg/ml effectively rules out acute congestive heart failure with 99% negative predictive value.    Results may be falsely decreased if patient taking Biotin.     TROPONIN (IN-HOUSE) - Normal    Narrative:     Troponin T Reference Range:  <= 0.03 ng/mL-   Negative for AMI  >0.03 ng/mL-     Abnormal for myocardial necrosis.  Clinicians would have to utilize clinical acumen, EKG, Troponin and serial changes to determine if it is an Acute Myocardial Infarction or myocardial injury due to an underlying chronic condition.       Results may be falsely decreased if patient taking Biotin.     CBC WITH AUTO DIFFERENTIAL - Normal   LIPASE - Normal   COVID-19,ABBOTT IN-HOUSE,NASAL SWAB (NO TRANSPORT MEDIA) 2 HR TAT   CBC AND DIFFERENTIAL    Narrative:     The following " orders were created for panel order CBC & Differential.  Procedure                               Abnormality         Status                     ---------                               -----------         ------                     CBC Auto Differential[[id number]]        Normal              Final result                 Please view results for these tests on the individual orders.   EXTRA TUBES    Narrative:     The following orders were created for panel order Extra Tubes.  Procedure                               Abnormality         Status                     ---------                               -----------         ------                     Light Blue Top[[id number]]                                   Final result               Gold Top - SST[[id number]]                                   Final result                 Please view results for these tests on the individual orders.   LIGHT BLUE TOP   GOLD TOP - SST     Medications   adenosine (ADENOCARD) injection 6 mg ( Intravenous Not Given 4/13/21 1903)   sodium chloride 0.9 % flush 10 mL (has no administration in time range)   dilTIAZem (CARDIZEM) 100 mg in 100 mL NS infusion (ADV) (12.5 mg/hr Intravenous Rate/Dose Change 4/13/21 1942)   dilTIAZem (CARDIZEM) injection 10 mg (10 mg Intravenous Given 4/13/21 1843)   adenosine (ADENOCARD) injection (12 mg Intravenous Given 4/13/21 1830)     XR Chest 1 View    Result Date: 4/13/2021  There is no significant change when compared to the prior study. There is no evidence for acute cardiopulmonary process.  Electronically Signed By-Carlos Tilley MD On:4/13/2021 7:42 PM This report was finalized on [id number] by  Carlos Tilley MD.                                         MDM  Number of Diagnoses or Management Options  New onset a-fib (CMS/HCC)  Palpitations  Diagnosis management comments: She had IV established and blood work was obtained she was placed on the cardiac monitor and she was given 6 mg of adenosine which did  not lower her heart rate she was then given 12 mg of adenosine and heart rate reduced into the 140s and it was visible that the patient was in atrial fibrillation.  At this time the patient was given a Cardizem bolus and drip and heart rate converted to normal sinus rhythm and reduced down into the 70s patient states she feels better and will be admitted for chest pain rule out and further evaluation of the new onset atrial fibrillation.  The patient was made aware of this and agreeable to this plan of care-    Was discussed with  nurse practitioner with the hospitalist and will be admitted to Dr. Pena       Amount and/or Complexity of Data Reviewed  Tests in the medicine section of CPT®: reviewed    Risk of Complications, Morbidity, and/or Mortality  Presenting problems: low  Diagnostic procedures: low  Management options: low    Patient Progress  Patient progress: improved      Final diagnoses:   New onset a-fib (CMS/HCC)   Palpitations       ED Disposition  ED Disposition     ED Disposition Condition Comment    Decision to Admit  Level of Care: Telemetry [5]   Diagnosis: New onset a-fib (CMS/HCC) [500874]   Admitting Physician: EZE PENA [[id number]]   Attending Physician: EZE PENA [[id number]]            No follow-up provider specified.       Medication List      No changes were made to your prescriptions during this visit.          Serena Ivey, APRN  04/13/21 2000

## 2021-04-13 NOTE — TELEPHONE ENCOUNTER
Patient and explained the seriousness of her condition  I reviewed the note from the urgent care and the EKG  I explained to her that she is in SVT and that she needed to be in the emergency room  I explained to her that she needed to have somebody else drive her as she could easily pass out while driving if she was still in SVT  Patient was agreeable and said she would go to the emergency room now

## 2021-04-14 ENCOUNTER — APPOINTMENT (OUTPATIENT)
Dept: CT IMAGING | Facility: HOSPITAL | Age: 66
End: 2021-04-14

## 2021-04-14 ENCOUNTER — ANESTHESIA EVENT (OUTPATIENT)
Dept: CARDIOLOGY | Facility: HOSPITAL | Age: 66
End: 2021-04-14

## 2021-04-14 ENCOUNTER — ANESTHESIA (OUTPATIENT)
Dept: CARDIOLOGY | Facility: HOSPITAL | Age: 66
End: 2021-04-14

## 2021-04-14 ENCOUNTER — APPOINTMENT (OUTPATIENT)
Dept: ULTRASOUND IMAGING | Facility: HOSPITAL | Age: 66
End: 2021-04-14

## 2021-04-14 ENCOUNTER — APPOINTMENT (OUTPATIENT)
Dept: CARDIOLOGY | Facility: HOSPITAL | Age: 66
End: 2021-04-14

## 2021-04-14 PROBLEM — I48.92 ATRIAL FLUTTER WITH RAPID VENTRICULAR RESPONSE (HCC): Status: ACTIVE | Noted: 2021-04-13

## 2021-04-14 PROBLEM — I48.91 NEW ONSET A-FIB: Status: RESOLVED | Noted: 2021-04-13 | Resolved: 2021-04-14

## 2021-04-14 PROBLEM — R00.2 PALPITATIONS: Status: ACTIVE | Noted: 2021-04-13

## 2021-04-14 LAB
ADV 40+41 DNA STL QL NAA+NON-PROBE: NOT DETECTED
AMPHET+METHAMPHET UR QL: NEGATIVE
ANION GAP SERPL CALCULATED.3IONS-SCNC: 14 MMOL/L (ref 5–15)
ASTRO TYP 1-8 RNA STL QL NAA+NON-PROBE: NOT DETECTED
BARBITURATES UR QL SCN: NEGATIVE
BASOPHILS # BLD AUTO: 0 10*3/MM3 (ref 0–0.2)
BASOPHILS NFR BLD AUTO: 0.3 % (ref 0–1.5)
BENZODIAZ UR QL SCN: NEGATIVE
BUN SERPL-MCNC: 16 MG/DL (ref 8–23)
BUN/CREAT SERPL: 26.2 (ref 7–25)
C CAYETANENSIS DNA STL QL NAA+NON-PROBE: NOT DETECTED
C COLI+JEJ+UPSA DNA STL QL NAA+NON-PROBE: NOT DETECTED
CALCIUM SPEC-SCNC: 8.1 MG/DL (ref 8.6–10.5)
CANNABINOIDS SERPL QL: NEGATIVE
CHLORIDE SERPL-SCNC: 103 MMOL/L (ref 98–107)
CO2 SERPL-SCNC: 22 MMOL/L (ref 22–29)
COCAINE UR QL: NEGATIVE
CREAT SERPL-MCNC: 0.61 MG/DL (ref 0.57–1)
CRYPTOSP DNA STL QL NAA+NON-PROBE: NOT DETECTED
DEPRECATED RDW RBC AUTO: 42 FL (ref 37–54)
E HISTOLYT DNA STL QL NAA+NON-PROBE: NOT DETECTED
EAEC PAA PLAS AGGR+AATA ST NAA+NON-PRB: NOT DETECTED
EC STX1+STX2 GENES STL QL NAA+NON-PROBE: NOT DETECTED
EOSINOPHIL # BLD AUTO: 0.1 10*3/MM3 (ref 0–0.4)
EOSINOPHIL NFR BLD AUTO: 1.3 % (ref 0.3–6.2)
EPEC EAE GENE STL QL NAA+NON-PROBE: NOT DETECTED
ERYTHROCYTE [DISTWIDTH] IN BLOOD BY AUTOMATED COUNT: 14.1 % (ref 12.3–15.4)
ETEC LTA+ST1A+ST1B TOX ST NAA+NON-PROBE: NOT DETECTED
G LAMBLIA DNA STL QL NAA+NON-PROBE: NOT DETECTED
GFR SERPL CREATININE-BSD FRML MDRD: 98 ML/MIN/1.73
GLUCOSE SERPL-MCNC: 102 MG/DL (ref 65–99)
HCT VFR BLD AUTO: 39 % (ref 34–46.6)
HGB BLD-MCNC: 13.1 G/DL (ref 12–15.9)
LYMPHOCYTES # BLD AUTO: 1.7 10*3/MM3 (ref 0.7–3.1)
LYMPHOCYTES NFR BLD AUTO: 21.4 % (ref 19.6–45.3)
MAGNESIUM SERPL-MCNC: 1.8 MG/DL (ref 1.6–2.4)
MCH RBC QN AUTO: 28.7 PG (ref 26.6–33)
MCHC RBC AUTO-ENTMCNC: 33.7 G/DL (ref 31.5–35.7)
MCV RBC AUTO: 85.2 FL (ref 79–97)
METHADONE UR QL SCN: NEGATIVE
MONOCYTES # BLD AUTO: 0.6 10*3/MM3 (ref 0.1–0.9)
MONOCYTES NFR BLD AUTO: 7.7 % (ref 5–12)
NEUTROPHILS NFR BLD AUTO: 5.6 10*3/MM3 (ref 1.7–7)
NEUTROPHILS NFR BLD AUTO: 69.3 % (ref 42.7–76)
NOROVIRUS GI+II RNA STL QL NAA+NON-PROBE: DETECTED
NRBC BLD AUTO-RTO: 0.2 /100 WBC (ref 0–0.2)
OPIATES UR QL: NEGATIVE
OXYCODONE UR QL SCN: NEGATIVE
P SHIGELLOIDES DNA STL QL NAA+NON-PROBE: NOT DETECTED
PLATELET # BLD AUTO: 233 10*3/MM3 (ref 140–450)
PMV BLD AUTO: 10.6 FL (ref 6–12)
POTASSIUM SERPL-SCNC: 3.2 MMOL/L (ref 3.5–5.2)
POTASSIUM SERPL-SCNC: 3.7 MMOL/L (ref 3.5–5.2)
QT INTERVAL: 416 MS
RBC # BLD AUTO: 4.58 10*6/MM3 (ref 3.77–5.28)
RVA RNA STL QL NAA+NON-PROBE: NOT DETECTED
S ENT+BONG DNA STL QL NAA+NON-PROBE: NOT DETECTED
SAPO I+II+IV+V RNA STL QL NAA+NON-PROBE: NOT DETECTED
SHIGELLA SP+EIEC IPAH ST NAA+NON-PROBE: NOT DETECTED
SODIUM SERPL-SCNC: 139 MMOL/L (ref 136–145)
V CHOL+PARA+VUL DNA STL QL NAA+NON-PROBE: NOT DETECTED
V CHOLERAE DNA STL QL NAA+NON-PROBE: NOT DETECTED
WBC # BLD AUTO: 8 10*3/MM3 (ref 3.4–10.8)
Y ENTEROCOL DNA STL QL NAA+NON-PROBE: NOT DETECTED

## 2021-04-14 PROCEDURE — C1730 CATH, EP, 19 OR FEW ELECT: HCPCS | Performed by: INTERNAL MEDICINE

## 2021-04-14 PROCEDURE — 99222 1ST HOSP IP/OBS MODERATE 55: CPT | Performed by: INTERNAL MEDICINE

## 2021-04-14 PROCEDURE — 80307 DRUG TEST PRSMV CHEM ANLYZR: CPT | Performed by: NURSE PRACTITIONER

## 2021-04-14 PROCEDURE — 93613 INTRACARDIAC EPHYS 3D MAPG: CPT | Performed by: INTERNAL MEDICINE

## 2021-04-14 PROCEDURE — 93653 COMPRE EP EVAL TX SVT: CPT | Performed by: INTERNAL MEDICINE

## 2021-04-14 PROCEDURE — B244ZZZ ULTRASONOGRAPHY OF RIGHT HEART: ICD-10-PCS | Performed by: INTERNAL MEDICINE

## 2021-04-14 PROCEDURE — 25010000002 PROPOFOL 10 MG/ML EMULSION: Performed by: REGISTERED NURSE

## 2021-04-14 PROCEDURE — 84132 ASSAY OF SERUM POTASSIUM: CPT | Performed by: FAMILY MEDICINE

## 2021-04-14 PROCEDURE — 74176 CT ABD & PELVIS W/O CONTRAST: CPT

## 2021-04-14 PROCEDURE — 93306 TTE W/DOPPLER COMPLETE: CPT | Performed by: INTERNAL MEDICINE

## 2021-04-14 PROCEDURE — 83735 ASSAY OF MAGNESIUM: CPT | Performed by: NURSE PRACTITIONER

## 2021-04-14 PROCEDURE — C1894 INTRO/SHEATH, NON-LASER: HCPCS | Performed by: INTERNAL MEDICINE

## 2021-04-14 PROCEDURE — 0097U HC BIOFIRE FILMARRAY GI PANEL: CPT | Performed by: FAMILY MEDICINE

## 2021-04-14 PROCEDURE — 02583ZZ DESTRUCTION OF CONDUCTION MECHANISM, PERCUTANEOUS APPROACH: ICD-10-PCS | Performed by: INTERNAL MEDICINE

## 2021-04-14 PROCEDURE — C1732 CATH, EP, DIAG/ABL, 3D/VECT: HCPCS | Performed by: INTERNAL MEDICINE

## 2021-04-14 PROCEDURE — 93306 TTE W/DOPPLER COMPLETE: CPT

## 2021-04-14 PROCEDURE — 25010000002 ONDANSETRON PER 1 MG: Performed by: NURSE PRACTITIONER

## 2021-04-14 PROCEDURE — 93662 INTRACARDIAC ECG (ICE): CPT | Performed by: INTERNAL MEDICINE

## 2021-04-14 PROCEDURE — 93005 ELECTROCARDIOGRAM TRACING: CPT | Performed by: INTERNAL MEDICINE

## 2021-04-14 PROCEDURE — 4A0234Z MEASUREMENT OF CARDIAC ELECTRICAL ACTIVITY, PERCUTANEOUS APPROACH: ICD-10-PCS | Performed by: INTERNAL MEDICINE

## 2021-04-14 PROCEDURE — 99233 SBSQ HOSP IP/OBS HIGH 50: CPT | Performed by: FAMILY MEDICINE

## 2021-04-14 PROCEDURE — C1759 CATH, INTRA ECHOCARDIOGRAPHY: HCPCS | Performed by: INTERNAL MEDICINE

## 2021-04-14 PROCEDURE — 85025 COMPLETE CBC W/AUTO DIFF WBC: CPT | Performed by: NURSE PRACTITIONER

## 2021-04-14 PROCEDURE — 25010000002 FENTANYL CITRATE (PF) 100 MCG/2ML SOLUTION: Performed by: REGISTERED NURSE

## 2021-04-14 PROCEDURE — 80048 BASIC METABOLIC PNL TOTAL CA: CPT | Performed by: NURSE PRACTITIONER

## 2021-04-14 PROCEDURE — 25010000002 MIDAZOLAM PER 1 MG: Performed by: REGISTERED NURSE

## 2021-04-14 PROCEDURE — 02K83ZZ MAP CONDUCTION MECHANISM, PERCUTANEOUS APPROACH: ICD-10-PCS | Performed by: INTERNAL MEDICINE

## 2021-04-14 PROCEDURE — 25010000002 ENOXAPARIN PER 10 MG: Performed by: NURSE PRACTITIONER

## 2021-04-14 RX ORDER — PANTOPRAZOLE SODIUM 40 MG/1
40 TABLET, DELAYED RELEASE ORAL
Status: DISCONTINUED | OUTPATIENT
Start: 2021-04-14 | End: 2021-04-14

## 2021-04-14 RX ORDER — MIDAZOLAM HYDROCHLORIDE 1 MG/ML
INJECTION INTRAMUSCULAR; INTRAVENOUS AS NEEDED
Status: DISCONTINUED | OUTPATIENT
Start: 2021-04-14 | End: 2021-04-14 | Stop reason: SURG

## 2021-04-14 RX ORDER — SODIUM CHLORIDE 0.9 % (FLUSH) 0.9 %
3 SYRINGE (ML) INJECTION EVERY 12 HOURS SCHEDULED
Status: CANCELLED | OUTPATIENT
Start: 2021-04-14

## 2021-04-14 RX ORDER — POTASSIUM CHLORIDE 7.45 MG/ML
10 INJECTION INTRAVENOUS
Status: DISCONTINUED | OUTPATIENT
Start: 2021-04-14 | End: 2021-04-16 | Stop reason: HOSPADM

## 2021-04-14 RX ORDER — FAMOTIDINE 20 MG/1
20 TABLET, FILM COATED ORAL
Status: DISCONTINUED | OUTPATIENT
Start: 2021-04-14 | End: 2021-04-14

## 2021-04-14 RX ORDER — HYDROCODONE BITARTRATE AND ACETAMINOPHEN 5; 325 MG/1; MG/1
1 TABLET ORAL EVERY 4 HOURS PRN
Status: DISCONTINUED | OUTPATIENT
Start: 2021-04-14 | End: 2021-04-16 | Stop reason: HOSPADM

## 2021-04-14 RX ORDER — SODIUM CHLORIDE, SODIUM LACTATE, POTASSIUM CHLORIDE, CALCIUM CHLORIDE 600; 310; 30; 20 MG/100ML; MG/100ML; MG/100ML; MG/100ML
100 INJECTION, SOLUTION INTRAVENOUS CONTINUOUS
Status: DISCONTINUED | OUTPATIENT
Start: 2021-04-14 | End: 2021-04-15

## 2021-04-14 RX ORDER — SODIUM CHLORIDE 0.9 % (FLUSH) 0.9 %
3-10 SYRINGE (ML) INJECTION AS NEEDED
Status: CANCELLED | OUTPATIENT
Start: 2021-04-14

## 2021-04-14 RX ORDER — PROPOFOL 10 MG/ML
VIAL (ML) INTRAVENOUS AS NEEDED
Status: DISCONTINUED | OUTPATIENT
Start: 2021-04-14 | End: 2021-04-14 | Stop reason: SURG

## 2021-04-14 RX ORDER — FENTANYL CITRATE 50 UG/ML
INJECTION, SOLUTION INTRAMUSCULAR; INTRAVENOUS AS NEEDED
Status: DISCONTINUED | OUTPATIENT
Start: 2021-04-14 | End: 2021-04-14 | Stop reason: SURG

## 2021-04-14 RX ORDER — LIDOCAINE HYDROCHLORIDE 20 MG/ML
INJECTION, SOLUTION INFILTRATION; PERINEURAL AS NEEDED
Status: DISCONTINUED | OUTPATIENT
Start: 2021-04-14 | End: 2021-04-14 | Stop reason: HOSPADM

## 2021-04-14 RX ORDER — SODIUM CHLORIDE, SODIUM LACTATE, POTASSIUM CHLORIDE, CALCIUM CHLORIDE 600; 310; 30; 20 MG/100ML; MG/100ML; MG/100ML; MG/100ML
INJECTION, SOLUTION INTRAVENOUS CONTINUOUS PRN
Status: DISCONTINUED | OUTPATIENT
Start: 2021-04-14 | End: 2021-04-14 | Stop reason: SURG

## 2021-04-14 RX ORDER — AMLODIPINE BESYLATE 5 MG/1
5 TABLET ORAL DAILY
Status: DISCONTINUED | OUTPATIENT
Start: 2021-04-14 | End: 2021-04-14

## 2021-04-14 RX ORDER — METOPROLOL TARTRATE 5 MG/5ML
2.5 INJECTION INTRAVENOUS ONCE
Status: COMPLETED | OUTPATIENT
Start: 2021-04-14 | End: 2021-04-14

## 2021-04-14 RX ORDER — PANTOPRAZOLE SODIUM 40 MG/10ML
40 INJECTION, POWDER, LYOPHILIZED, FOR SOLUTION INTRAVENOUS
Status: DISCONTINUED | OUTPATIENT
Start: 2021-04-14 | End: 2021-04-16 | Stop reason: HOSPADM

## 2021-04-14 RX ORDER — PHENYLEPHRINE HCL IN 0.9% NACL 1 MG/10 ML
SYRINGE (ML) INTRAVENOUS AS NEEDED
Status: DISCONTINUED | OUTPATIENT
Start: 2021-04-14 | End: 2021-04-14 | Stop reason: SURG

## 2021-04-14 RX ORDER — ASPIRIN 81 MG/1
81 TABLET ORAL DAILY
Status: DISCONTINUED | OUTPATIENT
Start: 2021-04-14 | End: 2021-04-16 | Stop reason: HOSPADM

## 2021-04-14 RX ORDER — ONDANSETRON 2 MG/ML
4 INJECTION INTRAMUSCULAR; INTRAVENOUS EVERY 6 HOURS PRN
Status: DISCONTINUED | OUTPATIENT
Start: 2021-04-14 | End: 2021-04-16 | Stop reason: HOSPADM

## 2021-04-14 RX ORDER — DIGOXIN 0.25 MG/ML
250 INJECTION INTRAMUSCULAR; INTRAVENOUS ONCE
Status: DISCONTINUED | OUTPATIENT
Start: 2021-04-14 | End: 2021-04-14

## 2021-04-14 RX ADMIN — FENTANYL CITRATE 100 MCG: 50 INJECTION, SOLUTION INTRAMUSCULAR; INTRAVENOUS at 14:40

## 2021-04-14 RX ADMIN — Medication 200 MCG: at 16:14

## 2021-04-14 RX ADMIN — METOPROLOL TARTRATE 2.5 MG: 5 INJECTION INTRAVENOUS at 09:27

## 2021-04-14 RX ADMIN — PROPOFOL 100 MCG/KG/MIN: 10 INJECTION, EMULSION INTRAVENOUS at 14:40

## 2021-04-14 RX ADMIN — PROPOFOL 50 MG: 10 INJECTION, EMULSION INTRAVENOUS at 15:24

## 2021-04-14 RX ADMIN — POTASSIUM CHLORIDE 40 MEQ: 1500 TABLET, EXTENDED RELEASE ORAL at 05:16

## 2021-04-14 RX ADMIN — Medication 200 MCG: at 15:56

## 2021-04-14 RX ADMIN — SODIUM CHLORIDE, POTASSIUM CHLORIDE, SODIUM LACTATE AND CALCIUM CHLORIDE 500 ML: 600; 310; 30; 20 INJECTION, SOLUTION INTRAVENOUS at 11:19

## 2021-04-14 RX ADMIN — Medication 10 ML: at 00:58

## 2021-04-14 RX ADMIN — Medication 300 MCG: at 15:01

## 2021-04-14 RX ADMIN — Medication 200 MCG: at 16:23

## 2021-04-14 RX ADMIN — ENOXAPARIN SODIUM 70 MG: 80 INJECTION SUBCUTANEOUS at 00:59

## 2021-04-14 RX ADMIN — Medication 200 MCG: at 15:41

## 2021-04-14 RX ADMIN — SODIUM CHLORIDE, POTASSIUM CHLORIDE, SODIUM LACTATE AND CALCIUM CHLORIDE 100 ML/HR: 600; 310; 30; 20 INJECTION, SOLUTION INTRAVENOUS at 10:20

## 2021-04-14 RX ADMIN — Medication 200 MCG: at 15:16

## 2021-04-14 RX ADMIN — PROPOFOL 50 MG: 10 INJECTION, EMULSION INTRAVENOUS at 15:31

## 2021-04-14 RX ADMIN — Medication 10 ML: at 08:36

## 2021-04-14 RX ADMIN — Medication 200 MCG: at 16:43

## 2021-04-14 RX ADMIN — Medication 200 MCG: at 16:15

## 2021-04-14 RX ADMIN — PROPOFOL 50 MG: 10 INJECTION, EMULSION INTRAVENOUS at 15:27

## 2021-04-14 RX ADMIN — PANTOPRAZOLE SODIUM 40 MG: 40 INJECTION, POWDER, FOR SOLUTION INTRAVENOUS at 11:30

## 2021-04-14 RX ADMIN — Medication 200 MCG: at 15:53

## 2021-04-14 RX ADMIN — Medication 100 MCG: at 16:35

## 2021-04-14 RX ADMIN — ASPIRIN 81 MG: 81 TABLET, COATED ORAL at 20:59

## 2021-04-14 RX ADMIN — MIDAZOLAM 2 MG: 1 INJECTION INTRAMUSCULAR; INTRAVENOUS at 14:40

## 2021-04-14 RX ADMIN — ONDANSETRON 4 MG: 2 INJECTION INTRAMUSCULAR; INTRAVENOUS at 08:36

## 2021-04-14 RX ADMIN — Medication 200 MCG: at 15:57

## 2021-04-14 RX ADMIN — Medication 200 MCG: at 15:08

## 2021-04-14 RX ADMIN — SODIUM CHLORIDE, SODIUM LACTATE, POTASSIUM CHLORIDE, AND CALCIUM CHLORIDE: .6; .31; .03; .02 INJECTION, SOLUTION INTRAVENOUS at 14:37

## 2021-04-14 RX ADMIN — Medication 200 MCG: at 16:33

## 2021-04-14 RX ADMIN — Medication 200 MCG: at 15:31

## 2021-04-14 RX ADMIN — SODIUM CHLORIDE 10 MG/HR: 900 INJECTION, SOLUTION INTRAVENOUS at 04:25

## 2021-04-14 RX ADMIN — ACETAMINOPHEN 650 MG: 325 TABLET, FILM COATED ORAL at 21:02

## 2021-04-14 RX ADMIN — Medication 200 MCG: at 15:22

## 2021-04-14 RX ADMIN — Medication 300 MCG: at 15:06

## 2021-04-14 RX ADMIN — Medication 10 ML: at 21:03

## 2021-04-14 RX ADMIN — SODIUM CHLORIDE, POTASSIUM CHLORIDE, SODIUM LACTATE AND CALCIUM CHLORIDE 500 ML: 600; 310; 30; 20 INJECTION, SOLUTION INTRAVENOUS at 09:27

## 2021-04-14 RX ADMIN — SODIUM CHLORIDE 10 MG/HR: 900 INJECTION, SOLUTION INTRAVENOUS at 11:17

## 2021-04-14 RX ADMIN — Medication 200 MCG: at 15:27

## 2021-04-14 NOTE — PLAN OF CARE
Problem: Adult Inpatient Plan of Care  Goal: Plan of Care Review  Outcome: Ongoing, Progressing  Flowsheets (Taken 4/14/2021 0200)  Progress: improving  Plan of Care Reviewed With: patient  Outcome Summary: Pt is currently on a cardizem at 5mg/hr.  She is currently in sinus danuta at 58.  VSS  Will continue to monitor  Goal: Patient-Specific Goal (Individualized)  Outcome: Ongoing, Progressing  Goal: Absence of Hospital-Acquired Illness or Injury  Outcome: Ongoing, Progressing  Intervention: Identify and Manage Fall Risk  Recent Flowsheet Documentation  Taken 4/14/2021 0048 by Oumou Pa, RN  Safety Promotion/Fall Prevention:   safety round/check completed   room organization consistent   nonskid shoes/slippers when out of bed   lighting adjusted   clutter free environment maintained   assistive device/personal items within reach   activity supervised  Intervention: Prevent Skin Injury  Recent Flowsheet Documentation  Taken 4/14/2021 0048 by Oumou Pa, RN  Body Position: position changed independently  Intervention: Prevent Infection  Recent Flowsheet Documentation  Taken 4/14/2021 0048 by Oumou Pa, RN  Infection Prevention:   visitors restricted/screened   single patient room provided   rest/sleep promoted   personal protective equipment utilized   hand hygiene promoted   equipment surfaces disinfected  Goal: Optimal Comfort and Wellbeing  Outcome: Ongoing, Progressing  Intervention: Provide Person-Centered Care  Recent Flowsheet Documentation  Taken 4/14/2021 0048 by Oumou Pa RN  Trust Relationship/Rapport:   care explained   questions encouraged   questions answered   reassurance provided   thoughts/feelings acknowledged  Goal: Readiness for Transition of Care  Outcome: Ongoing, Progressing  Intervention: Mutually Develop Transition Plan  Recent Flowsheet Documentation  Taken 4/14/2021 0049 by Oumou Pa RN  Transportation Anticipated:   family or friend will  provide   car, drives self  Patient/Family Anticipated Services at Transition: none  Patient/Family Anticipates Transition to: home with family  Taken 4/14/2021 0044 by Oumou Pa, RN  Equipment Currently Used at Home: none     Problem: Fall Injury Risk  Goal: Absence of Fall and Fall-Related Injury  Outcome: Ongoing, Progressing  Intervention: Identify and Manage Contributors to Fall Injury Risk  Recent Flowsheet Documentation  Taken 4/14/2021 0048 by Oumou Pa, RN  Medication Review/Management: medications reviewed  Self-Care Promotion:   BADL personal routines maintained   BADL personal objects within reach  Intervention: Promote Injury-Free Environment  Recent Flowsheet Documentation  Taken 4/14/2021 0048 by Oumou Pa, RN  Safety Promotion/Fall Prevention:   safety round/check completed   room organization consistent   nonskid shoes/slippers when out of bed   lighting adjusted   clutter free environment maintained   assistive device/personal items within reach   activity supervised     Problem: Arrhythmia/Dysrhythmia  Goal: Normalized Cardiac Rhythm  Outcome: Ongoing, Progressing   Goal Outcome Evaluation:  Plan of Care Reviewed With: patient  Progress: improving  Outcome Summary: Pt is currently on a cardizem at 5mg/hr.  She is currently in sinus danuta at 58.  VSS  Will continue to monitor

## 2021-04-14 NOTE — PROGRESS NOTES
Continued Stay Note   Elder     Patient Name: Katarina Phillips  MRN: [id number]  Today's Date: 4/14/2021    Admit Date: 4/13/2021    Discharge Plan     Row Name 04/14/21 1529       Plan    Patient/Family in Agreement with Plan  unable to assess    Plan Comments  CM attempted to discuss dc planning with patient, however off the floor at this time for EP/Ablation. CM attempted to contact patient's spouse, Carlos ([phone]), no answer so voicemail was left with CM name and callback number.        Phone communication or documentation only - no physical contact with patient or family.    Fatuma Phillips RN     Office Phone: [phone]  Office Cell: [phone]

## 2021-04-14 NOTE — H&P
"      [facility] Medicine Services      Patient Name: Katarina Phillips  : 1955  MRN: [id number]  Primary Care Physician: Chloe Segura MD  Date of admission: 2021    Patient Care Team:  Chloe Segura MD as PCP - General          Subjective   History Present Illness     Chief Complaint:   Chief Complaint   Patient presents with   • Rapid Heart Rate         Ms. Phillips is a 66 y.o.  presents to [facility] complaining of accelerated heart rate as noted at the urgent care center.           66-year-old female presents to the ER with a chief complaint of identified accelerated heart rate, atrial fibrillation at the urgent care center today.  The patient had gone to the urgent care center secondary to acute epigastric pain with vomiting x2 days.  The patient states she had not felt well for several days with epigastric discomfort with radiation to the back.   she started vomiting and vomited multiple times for 2 days without any hematemesis.  The patient states her  insisted she seek medical attention today so she went to the urgent care center where she was noted to have atrial fibrillation with RVR.  Patient denies any chest pain, subjective fever or chills, shortness of breath.  The patient does report change in her stool which she describes as flat and ribbony with episodes of blood mixed with mucus.  The patient reports she had a colonoscopy a little over 10 years ago and was told that it was \"clean\" with recommendation for 10-year follow-up.  The patient states she did not notice she had irregular heartbeat or accelerated heartbeat until it was found at the urgent care center.    Review of records: The patient was seen by her primary care provider 2021 and restarted on blood pressure medicine, amlodipine and medication for depression, Prozac.  The patient had stopped taking these medications for a couple of years but was " ready to get restarted on them.  She had follow-up appointment February 2021 with increase of her amlodipine from 5 mg to 10 mg daily and increase of her Prozac from 20 mg daily to 40 mg daily.      Review of Systems   Constitutional: Negative for chills and fever.   Cardiovascular: Positive for chest pain.        Epigastric pain chest pain equivalent   Respiratory: Negative for cough and shortness of breath.    Gastrointestinal: Positive for abdominal pain, change in bowel habit, hematochezia, nausea and vomiting.   All other systems reviewed and are negative.          Personal History     Past Medical History:   Past Medical History:   Diagnosis Date   • Hypertension    • Hyperthyroidism        Surgical History:      Past Surgical History:   Procedure Laterality Date   • BACK SURGERY             Family History: family history is not on file. Otherwise pertinent FHx was reviewed and unremarkable.     Social History:  reports that she quit smoking about 29 years ago. She has never used smokeless tobacco. She reports previous alcohol use. She reports that she does not use drugs.      Medications:  Prior to Admission medications    Medication Sig Start Date End Date Taking? Authorizing Provider   amLODIPine (NORVASC) 10 MG tablet Take 1 tablet by mouth Daily. 3/26/21  Yes Chloe Segura MD   cholecalciferol (VITAMIN D3) 25 MCG (1000 UT) tablet Take 1,000 Units by mouth Daily.   Yes ProviderWhitley MD   FLUoxetine (PROzac) 20 MG capsule Take 2 capsules by mouth Daily. 3/24/21  Yes Chloe Segura MD   vitamin B-12 (CYANOCOBALAMIN) 1000 MCG tablet Take 1,000 mcg by mouth Daily.   Yes Whitley Parsons MD       Allergies:    Allergies   Allergen Reactions   • Doxycycline Hives   • Sulfa Antibiotics Hives       Objective   Objective     Vital Signs  Temp:  [97.9 °F (36.6 °C)-98 °F (36.7 °C)] 98 °F (36.7 °C)  Heart Rate:  [] 59  Resp:  [16-24] 16  BP: ()/(49-98) 101/49  SpO2:   [96 %-100 %] 99 %  on  Flow (L/min):  [2] 2;   Device (Oxygen Therapy): nasal cannula  Body mass index is 26.87 kg/m².    Physical Exam  Vitals and nursing note reviewed.   Constitutional:       Appearance: Normal appearance. She is not ill-appearing, toxic-appearing or diaphoretic.   HENT:      Head: Normocephalic and atraumatic.      Right Ear: External ear normal.      Left Ear: External ear normal.      Nose: Nose normal. No congestion or rhinorrhea.      Mouth/Throat:      Mouth: Mucous membranes are moist.   Eyes:      General: No scleral icterus.        Right eye: No discharge.         Left eye: No discharge.      Extraocular Movements: Extraocular movements intact.      Conjunctiva/sclera: Conjunctivae normal.      Pupils: Pupils are equal, round, and reactive to light.   Cardiovascular:      Rate and Rhythm: Tachycardia present. Rhythm irregular.      Heart sounds: No murmur heard.     Pulmonary:      Effort: Pulmonary effort is normal. No respiratory distress.      Breath sounds: Normal breath sounds.   Abdominal:      General: Bowel sounds are normal.      Palpations: Abdomen is soft.      Tenderness: There is abdominal tenderness.      Comments: Epigastric tenderness right upper quadrant   Musculoskeletal:         General: No swelling. Normal range of motion.      Cervical back: Normal range of motion and neck supple.      Right lower leg: No edema.      Left lower leg: No edema.   Skin:     General: Skin is warm and dry.      Capillary Refill: Capillary refill takes less than 2 seconds.      Coloration: Skin is not jaundiced or pale.   Neurological:      General: No focal deficit present.      Mental Status: She is alert and oriented to person, place, and time.   Psychiatric:         Mood and Affect: Mood normal.         Behavior: Behavior normal.         Thought Content: Thought content normal.         Judgment: Judgment normal.           Results Review:  I have personally reviewed most recent cardiac  tracings, lab results and radiology images and interpretations and agree with findings.    Results from last 7 days   Lab Units 04/13/21  1824   WBC 10*3/mm3 6.70   HEMOGLOBIN g/dL 14.7   HEMATOCRIT % 43.9   PLATELETS 10*3/mm3 267     Results from last 7 days   Lab Units 04/13/21  2321 04/13/21  1824   SODIUM mmol/L  --  139   POTASSIUM mmol/L  --  3.3*   CHLORIDE mmol/L  --  101   CO2 mmol/L  --  27.0   BUN mg/dL  --  14   CREATININE mg/dL  --  0.78   GLUCOSE mg/dL  --  105*   CALCIUM mg/dL  --  9.0   ALT (SGPT) U/L  --  20   AST (SGOT) U/L  --  20   TROPONIN T ng/mL <0.010 <0.010   PROBNP pg/mL  --  1,508.0*     Estimated Creatinine Clearance: 66.8 mL/min (by C-G formula based on SCr of 0.78 mg/dL).  Brief Urine Lab Results     None          Microbiology Results (last 10 days)     Procedure Component Value - Date/Time    COVID-19, ABBOTT IN-HOUSE,NASAL Swab (NO TRANSPORT MEDIA) 2 HR TAT - Swab, Nasopharynx [[id number]]  (Normal) Collected: 04/1955    Lab Status: Final result Specimen: Swab from Nasopharynx Updated: 04/13/21 2031     COVID19 Presumptive Negative    Narrative:      Fact sheet for providers: https://www.fda.gov/media/194013/download     Fact sheet for patients: https://www.fda.gov/media/940421/download    Test performed by PCR.  If inconsistent with clinical signs and symptoms patient should be tested with different authorized molecular test.        I have personally reviewed the EKG and noted atrial flutter with RVR.  ECG/EMG Results (most recent)     Procedure Component Value Units Date/Time    ECG 12 Lead [[id number]] Collected: 04/13/21 1815     Updated: 04/13/21 1816     QT Interval 313 ms     Narrative:      HEART RATE= 162  bpm  RR Interval= 372  ms  MA Interval= 54  ms  P Horizontal Axis=   deg  P Front Axis= 0  deg  QRSD Interval= 116  ms  QT Interval= 313  ms  QRS Axis= 58  deg  T Wave Axis= -50  deg  - ABNORMAL ECG -  Supraventricular tachycardia  Incomplete right bundle branch  block  Inferior infarct, age indeterminate  Electronically Signed By:   Date and Time of Study: 2021-04-13 18:15:17    ECG 12 Lead [[id number]] Collected: 04/13/21 2251     Updated: 04/13/21 2253     QT Interval 342 ms     Narrative:      HEART RATE= 134  bpm  RR Interval= 440  ms  HI Interval=   ms  P Horizontal Axis=   deg  P Front Axis=   deg  QRSD Interval= 94  ms  QT Interval= 342  ms  QRS Axis= 81  deg  T Wave Axis= 62  deg  - ABNORMAL ECG -  Atrial flutter  Probable anterolateral infarct, acute  Prolonged QT interval  Electronically Signed By:   Date and Time of Study: 2021-04-13 22:51:17                  XR Chest 1 View    Result Date: 4/13/2021  There is no significant change when compared to the prior study. There is no evidence for acute cardiopulmonary process.  Electronically Signed By-Carlos Tilley MD On:4/13/2021 7:42 PM This report was finalized on [id number] by  Carlos Tilley MD.        Estimated Creatinine Clearance: 66.8 mL/min (by C-G formula based on SCr of 0.78 mg/dL).    Assessment/Plan   Assessment/Plan       Active Hospital Problems    Diagnosis  POA   • **New onset a-fib (CMS/HCC) [I48.91]  Yes     Priority: High   • Gastroesophageal reflux disease [K21.9]  Yes   • Chronic pain disorder [G89.4]  Yes   • Hypertension [I10]  Yes   • Hyperlipidemia [E78.5]  Yes      Resolved Hospital Problems   No resolved problems to display.     Atrial fibrillation, atrial flutter with RVR: Correct electrolytes; cardiology consult; continue Cardizem drip; check serial troponin; add therapeutic Lovenox x1; echocardiogram    --Patient required Lopressor 5 mg x 1 to help control heart rate    --Patient was given Adenosine x1 in ER    --proBNP 1508    Epigastric abdominal pain, tenderness in the right upper quadrant; changes in stool, uncertain etiology--consideration for cardiac chest pain equivalent; consideration for gallbladder disease: Hemoccult stool x1; ultrasound right upper quadrant; check serial  troponin; may benefit from GI consult; add Pepcid 20 mg twice daily    --Lipase 28    Hypokalemia, mild, potassium 3.3--likely secondary to GI loss through vomiting: Check magnesium level; add potassium replacement protocol    Hypertension, chronic: Continue Norvasc at lower dose of 5 mg daily   --Patient had recently been increased to Norvasc 10 mg daily, however with the addition of IV Cardizem drip for heart rate control I am afraid the patient's blood pressure may be too low a full 10 mg dose.    Dietary supplementation with vitamin B12 and vitamin D deficiency, chronic: Continue vitamin D supplementation; continue vitamin B supplementation    Anxiety with depression, chronic: Continue Prozac 40 mg daily      VTE Prophylaxis -   Mechanical Order History:     None      Pharmalogical Order History:      Ordered     Dose Route Frequency Stop    04/13/21 2235  Pharmacy to Dose enoxaparin (LOVENOX)     Question:  Indication of use  Answer:  Atrial Fibrillation - requiring full anticoagulation    -- XX Continuous PRN --                CODE STATUS:    Code Status and Medical Interventions:   Ordered at: 04/13/21 2235     Code Status:    CPR     Medical Interventions (Level of Support Prior to Arrest):    Full       This patient has been examined wearing appropriate Personal Protective Equipment. 04/14/21      I discussed the patient's findings and my recommendations with patient.      Signature:Electronically signed by ALICJA Carpio, 04/14/21, 2:52 AM EDT.  [facility] Hospitalist Team

## 2021-04-14 NOTE — ED NOTES
Pt states that she is feeling fine. No complaints of pain, SOA, nausea     Awilda Henry LPN  04/13/21 2750

## 2021-04-14 NOTE — CONSULTS
CARDIOLOGY CONSULT NOTE      Referring Provider: Dr. Diamond    Reason for Consultation:    Atrial Flutter RVR  Abdominal pain          Attending: Jacques Diamond,*    Chief complaint    Diarrhea, Melena    Subjective .     History of present illness:  Katarina Phillips is a 66 y.o. female who originally went to urgent care yesterday with complaints of abdominal pain/bloating/diarrhea/nausea vomiting and fatigue.    The patient reports that over the last 4 to 5 months she has had increasing problems with changes in her bowel function.  She is included having intermittent mucousy/bloody discharge in between bowel movements to the extent that she has had to wear a pad that will become saturated with this discharge.    Over the last week she has reported increasing feelings of abdominal bloating, weight gain, nausea, vomiting and watery green foul-smelling diarrhea.    When she was seen in the urgent care she was noted to be tachycardic and advised to come to the emergency room for evaluation.  She was in narrow complex tachycardia.  In the emergency department she was given adenosine x1 and then noted to be in atrial flutter.    She was started on IV Cardizem and also given IV metoprolol x1.    This morning the patient remains in atrial flutter with a rapid ventricular rate her heart rates in the 140s.    She denies any chest pain or dyspnea but does complain of fatigue, abdominal discomfort and nausea        Review of Systems   Constitutional: Positive for malaise/fatigue. Negative for chills and fever.   HENT: Negative for ear discharge and nosebleeds.    Eyes: Negative for discharge and redness.   Cardiovascular: Positive for palpitations. Negative for chest pain, orthopnea, paroxysmal nocturnal dyspnea and syncope.   Respiratory: Positive for shortness of breath. Negative for cough and wheezing.    Endocrine: Negative for heat intolerance.   Skin: Negative for rash.   Musculoskeletal: Negative for  arthritis and myalgias.   Gastrointestinal: Positive for bloating, abdominal pain and diarrhea. Negative for melena, nausea and vomiting.   Genitourinary: Negative for dysuria and hematuria.   Neurological: Negative for dizziness, light-headedness, numbness and tremors.   Psychiatric/Behavioral: Negative for depression. The patient is not nervous/anxious.        History  Past Medical History:   Diagnosis Date   • Hypertension    • Hyperthyroidism        Past Surgical History:   Procedure Laterality Date   • BACK SURGERY         History reviewed. No pertinent family history.    Social History     Tobacco Use   • Smoking status: Former Smoker     Quit date: 1992     Years since quittin.0   • Smokeless tobacco: Never Used   Vaping Use   • Vaping Use: Never used   Substance Use Topics   • Alcohol use: Not Currently   • Drug use: Never        Medications Prior to Admission   Medication Sig Dispense Refill Last Dose   • amLODIPine (NORVASC) 10 MG tablet Take 1 tablet by mouth Daily. 90 tablet 1    • cholecalciferol (VITAMIN D3) 25 MCG (1000 UT) tablet Take 1,000 Units by mouth Daily.      • FLUoxetine (PROzac) 20 MG capsule Take 2 capsules by mouth Daily. 90 capsule 1    • vitamin B-12 (CYANOCOBALAMIN) 1000 MCG tablet Take 1,000 mcg by mouth Daily.            Doxycycline and Sulfa antibiotics    Scheduled Meds:[MAR Hold] cholecalciferol, 1,000 Units, Oral, Daily  [MAR Hold] FLUoxetine, 40 mg, Oral, Daily  metoprolol tartrate, 12.5 mg, Oral, Q12H  [MAR Hold] pantoprazole, 40 mg, Intravenous, Q AM  [MAR Hold] sodium chloride, 10 mL, Intravenous, Q12H  [MAR Hold] vitamin B-12, 1,000 mcg, Oral, Daily      Continuous Infusions:lactated ringers, 100 mL/hr, Last Rate: 100 mL/hr (21 1020)      PRN Meds:.•  [MAR Hold] acetaminophen **OR** [MAR Hold] acetaminophen **OR** [MAR Hold] acetaminophen  •  lidocaine  •  [MAR Hold] ondansetron **OR** [MAR Hold] ondansetron  •  [MAR Hold] potassium chloride  •  potassium  "chloride  •  potassium chloride  •  [COMPLETED] Insert peripheral IV **AND** [MAR Hold] sodium chloride  •  [MAR Hold] sodium chloride    Objective     VITAL SIGNS  Vitals:    04/14/21 0615 04/14/21 0927 04/14/21 1016 04/14/21 1025   BP: 100/69 140/64 96/65 96/65   BP Location: Right arm  Right arm    Patient Position: Lying  Lying    Pulse: (!) 148 (!) 151 (!) 143 (!) 144   Resp: 14  16    Temp: 98.1 °F (36.7 °C)  98.4 °F (36.9 °C)    TempSrc: Oral  Oral    SpO2: 100%  93%    Weight:    70.8 kg (156 lb)   Height:    162.6 cm (64\")       Flowsheet Rows      First Filed Value   Admission Height  162.6 cm (64\") Documented at 04/13/2021 1806   Admission Weight  72 kg (158 lb 11.7 oz) Documented at 04/13/2021 1806          Body mass index is 26.78 kg/m².     TELEMETRY: atrial flutter 150    Physical Exam:  Constitutional:       Appearance: Well-developed.   Eyes:      General: No scleral icterus.        Right eye: No discharge.   HENT:      Head: Normocephalic and atraumatic.   Neck:      Thyroid: No thyromegaly.      Lymphadenopathy: No cervical adenopathy.   Pulmonary:      Effort: Pulmonary effort is normal. No respiratory distress.      Breath sounds: Normal breath sounds. No wheezing. No rales.   Cardiovascular:      Tachycardia present. Irregularly irregular rhythm.      No gallop.   Abdominal:      Tenderness: There is no abdominal tenderness.   Skin:     Findings: No erythema or rash.   Neurological:      Mental Status: Alert and oriented to person, place, and time.          Results Review:   I reviewed the patient's new clinical results.    CBC    Results from last 7 days   Lab Units 04/14/21  0349 04/13/21  1824   WBC 10*3/mm3 8.00 6.70   HEMOGLOBIN g/dL 13.1 14.7   PLATELETS 10*3/mm3 233 267     BMP   Results from last 7 days   Lab Units 04/14/21  1322 04/14/21  0349 04/13/21  1824   SODIUM mmol/L  --  139 139   POTASSIUM mmol/L 3.7 3.2* 3.3*   CHLORIDE mmol/L  --  103 101   CO2 mmol/L  --  22.0 27.0   BUN " mg/dL  --  16 14   CREATININE mg/dL  --  0.61 0.78   GLUCOSE mg/dL  --  102* 105*   MAGNESIUM mg/dL  --  1.8  --      Cr Clearance Estimated Creatinine Clearance: 66.7 mL/min (by C-G formula based on SCr of 0.61 mg/dL).  Coag     HbA1C No results found for: HGBA1C  Blood Glucose No results found for: POCGLU  Infection     CMP   Results from last 7 days   Lab Units 04/14/21  1322 04/14/21  0349 04/13/21  1824   SODIUM mmol/L  --  139 139   POTASSIUM mmol/L 3.7 3.2* 3.3*   CHLORIDE mmol/L  --  103 101   CO2 mmol/L  --  22.0 27.0   BUN mg/dL  --  16 14   CREATININE mg/dL  --  0.61 0.78   GLUCOSE mg/dL  --  102* 105*   ALBUMIN g/dL  --   --  4.10   BILIRUBIN mg/dL  --   --  0.2   ALK PHOS U/L  --   --  87   AST (SGOT) U/L  --   --  20   ALT (SGPT) U/L  --   --  20   LIPASE U/L  --   --  28     ABG      UA      LINETTE  No results found for: POCMETH, POCAMPHET, POCBARBITUR, POCBENZO, POCCOCAINE, POCOPIATES, POCOXYCODO, POCPHENCYC, POCPROPOXY, POCTHC, POCTRICYC  Lysis Labs   Results from last 7 days   Lab Units 04/14/21  0349 04/13/21  1824   HEMOGLOBIN g/dL 13.1 14.7   PLATELETS 10*3/mm3 233 267   CREATININE mg/dL 0.61 0.78     Radiology(recent) CT Abdomen Pelvis Without Contrast    Result Date: 4/14/2021  1.No acute process identified within abdomen/pelivs. 2.Nonobstructing left renal stone.    Electronically Signed By-Carlos Molina MD On:4/14/2021 10:38 AM This report was finalized on [id number] by  Carlos Molina MD.    XR Chest 1 View    Result Date: 4/13/2021  There is no significant change when compared to the prior study. There is no evidence for acute cardiopulmonary process.  Electronically Signed By-Carlos Tilley MD On:4/13/2021 7:42 PM This report was finalized on [id number] by  Carlos Tilley MD.      Results from last 7 days   Lab Units 04/13/21  2321   TROPONIN T ng/mL <0.010       Imaging Results (Last 24 Hours)     Procedure Component Value Units Date/Time    CT Abdomen Pelvis Without Contrast  [[id number]] Collected: 04/14/21 1033     Updated: 04/14/21 1040    Narrative:      CT ABDOMEN PELVIS WO CONTRAST-     Date of Exam: 4/14/2021 10:11 AM     Indication: Abdominal pain, acute, nonlocalized; I48.91-Unspecified  atrial fibrillation; R00.2-Palpitations.     Comparison: None available.     Technique: CT scan of the abdomen and pelvis without IV contrast.  Automated exposure control and iterative reconstruction methods were  used.     FINDINGS:  The lung bases are clear.     The unenhanced liver, gallbladder, adrenal glands, spleen, and pancreas  are unremarkable. There is a moderate right renal cyst. There is a tiny  nonobstructing left renal stone.     The stomach appears normal. The small bowel appears normal in caliber  and configuration. The colon appears normal. The appendix appears  normal. There is no ascites or loculated collection. No abnormally  enlarged lymph nodes are identified.     The rectum, uterus and adnexa, and urinary bladder are unremarkable.     No aggressive osseous lesions are identified. There are prominent  Schmorl's nodes along the lower thoracic and upper lumbar spine.       Impression:      1.No acute process identified within abdomen/pelivs.  2.Nonobstructing left renal stone.           Electronically Signed By-Carlos Molina MD On:4/14/2021 10:38 AM  This report was finalized on [id number] by  Carlos Molina MD.          EKG          I personally viewed and interpreted the patient's EKG/Telemetry data:    ECHOCARDIOGRAM:      STRESS MYOVIEW:    CARDIAC CATHETERIZATION:    OTHER:         Assessment/Plan       New onset a-fib (CMS/HCC)    Chronic pain disorder    Gastroesophageal reflux disease    Hyperlipidemia    Hypertension    Palpitations    Atrial flutter with rapid ventricular response (CMS/HCC)      Assessment:    Atrial Flutter with RVR    -Duration unknown  Recent Diarrhea with melena and Fecal Incontinence  Abdominal Pain/Bloating  N/V  HTN by  history    Plan:    Stop Amlodipine  Continue Cardizem IV for now  Will start po BB and give 1x dose Metoprolol IV  Start IVF  Consider GI consult/CT abdomen ordered by Dr. Diamond  D/W Dr. Diamond  Consult EP for consideration of EPS/possible ablation   Echocardiogram to assess LV function    Additional recommendations per Dr. Berg    Patient is seen and examined and findings are verified.  Patient is presented with abdominal pain and diarrhea.  Cardiology consultation is requested because of palpitation and rapid heartbeat.  She is found to be in atrial flutter with rapid ventricular response.    Patient denies any chest pain.  Palpitation is reported.  Patient has mild abdominal pain    Heart rate is in 140s.  Blood pressure is stable.    Normal S1 and S2.  Heart rate is irregular.  Chest is clear to auscultation.  Abdominal exam is benign but slight tenderness.    Patient is presented with abdominal pain and is noted to have norovirus infection.  Which explains her abdominal symptoms.  She is also found to be in atrial flutter with rapid ventricular response.  I would recommend that patient should be evaluated by EP service for possible ablation    MDM:    1.  Atrial flutter:    I would recommend to proceed with EP consultation for ablation.  Continue low-dose beta-blocker.  Proceed with digoxin if is okay with EP service    2.  Hypertension:    Blood pressure is on the lower side.  I would discontinue amlodipine.    3.  Abdominal pain:    Patient has norovirus infection.  Further recommendation as per GI    I discussed the patients findings and my recommendations with patient, RN and Attending MD Olman Berg MD  04/14/21  16:28 EDT

## 2021-04-14 NOTE — CONSULTS
Cardiology Consult-EP      REQUESTING PROVIDER  Jacques Diamond,*    PATIENT IDENTIFICATION  Name: Katarina Phillips  Age: 66 y.o.  Sex: female  :  1955  MRN: [id number]             REASON  FOR  CONSULTATION  66-year-old female with no known prior cardiac history.  She does have known history of hypertension and thyroid disorder.    CC:  Palpitations  Atrial flutter with RVR    HPI:  Patient presented to [facility] 2021 with complaint of changes in bowel function over the past 4 to 5 months with abdominal bloating, weight gain, nausea, vomiting and diarrhea.  Patient presented to urgent care center where she was found to be tachycardic and directed to the ER at [facility].  In the ER, she was found to be in narrow complex tachycardia with rate 150s.  She was given adenosine 6 mg IV and rhythm was identified as atrial flutter.  She was given 1 dose IV metoprolol, therapeutic Lovenox and started on IV Cardizem drip.  Patient remains in atrial flutter with elevated heart rates this morning.  Cardiologist has evaluated and consulted electrophysiologist for further recommendations.  Upon my evaluation, patient is in bed undergoing echocardiography.  She reports ongoing abdominal discomfort, cramping.  She also reports palpitations with sensation of her heart rate going very fast.  She denies any actual chest discomfort or shortness of breath.      REVIEW OF SYSTEMS:  Positive for palpitations   Positive for nausea abdominal discomfort and diarrhea   Negative for fever and angina  Negative for TIA stroke renal disease or bleeding diathesis  Review of all pertinent systems negative        OBJECTIVE   Potassium 3.2  TSH 2.66    ASSESSMENT/PLAN  Atrial flutter with rapid ventricular response  Abdominal pain/diarrhea  Hypokalemia  History of hypertension    Recommendations  2D echocardiogram pending  CT abdomen pelvis pending  Monitor and replete electrolytes per protocol.   "Maintain potassium greater than 4, magnesium greater than 2.  Check serum magnesium  Keep patient n.p.o. until evaluated by Dr. Tran      Vital Signs  Visit Vitals  /64   Pulse (!) 151   Temp 98.1 °F (36.7 °C) (Oral)   Resp 14   Ht 162.6 cm (64\")   Wt 71 kg (156 lb 8.4 oz)   SpO2 100%   Breastfeeding No   BMI 26.87 kg/m²     Oxygen Therapy  SpO2: 100 %  Pulse Oximetry Type: Continuous  Device (Oxygen Therapy): nasal cannula  Flow (L/min): 2  Flowsheet Rows      First Filed Value   Admission Height  162.6 cm (64\") Documented at 2021 1806   Admission Weight  72 kg (158 lb 11.7 oz) Documented at 2021 1806        Intake & Output (last 3 days)     None        Lines, Drains & Airways    Active LDAs     Name:   Placement date:   Placement time:   Site:   Days:    Peripheral IV 21 181 Right Antecubital   21    1814    Antecubital   less than 1    Peripheral IV 21 2322 Left Forearm   21    2322    Forearm   less than 1                MEDICAL HISTORY    Past Medical History:   Diagnosis Date   • Hypertension    • Hyperthyroidism         SURGICAL HISTORY    Past Surgical History:   Procedure Laterality Date   • BACK SURGERY          FAMILY HISTORY    History reviewed. No pertinent family history.    SOCIAL HISTORY    Social History     Tobacco Use   • Smoking status: Former Smoker     Quit date: 1992     Years since quittin.0   • Smokeless tobacco: Never Used   Substance Use Topics   • Alcohol use: Not Currently        ALLERGIES    Allergies   Allergen Reactions   • Doxycycline Hives   • Sulfa Antibiotics Hives              /64   Pulse (!) 151   Temp 98.1 °F (36.7 °C) (Oral)   Resp 14   Ht 162.6 cm (64\")   Wt 71 kg (156 lb 8.4 oz)   SpO2 100%   Breastfeeding No   BMI 26.87 kg/m²   Intake/Output last 3 shifts:  No intake/output data recorded.  Intake/Output this shift:  No intake/output data recorded.    PHYSICAL EXAM:    General: Well-developed, " well-nourished 66-year-old  female who is alert, cooperative, no distress, appears stated age  Head:  Normocephalic, atraumatic, mucous membranes moist  Eyes:  Conjunctiva/corneas clear, EOM's intact     Neck:  Supple,  no adenopathy;      Lungs: Clear to auscultation bilaterally, no wheezes rhonchi rales are noted  Chest wall: No tenderness  Heart::  Regular rhythm and tachycardic, S1 and S2 normal, no murmur, rub or gallop  Abdomen: Soft, non-tender, nondistended bowel sounds active  Extremities: No cyanosis, clubbing, or edema groin soft no hematoma.  Pulses: 2+ and symmetric all extremities  Skin:  No rashes or lesions  Neuro/psych: A&O x3. CN II through XII are grossly intact with appropriate affect      Scheduled Meds:      adenosine, 6 mg, Intravenous, Once  cholecalciferol, 1,000 Units, Oral, Daily  FLUoxetine, 40 mg, Oral, Daily  metoprolol tartrate, 12.5 mg, Oral, Q12H  pantoprazole, 40 mg, Intravenous, Q AM  sodium chloride, 10 mL, Intravenous, Q12H  vitamin B-12, 1,000 mcg, Oral, Daily        Continuous Infusions:    dilTIAZem, 5-15 mg/hr, Last Rate: 10 mg/hr (04/14/21 0928)  lactated ringers, 100 mL/hr, Last Rate: 100 mL/hr (04/14/21 1020)        PRN Meds:    •  acetaminophen **OR** acetaminophen **OR** acetaminophen  •  ondansetron **OR** ondansetron  •  potassium chloride  •  potassium chloride  •  potassium chloride  •  [COMPLETED] Insert peripheral IV **AND** sodium chloride  •  sodium chloride     Data Review:     Results Review:     I reviewed the patient's new clinical results.    CBC    Results from last 7 days   Lab Units 04/14/21  0349 04/13/21  1824   WBC 10*3/mm3 8.00 6.70   HEMOGLOBIN g/dL 13.1 14.7   PLATELETS 10*3/mm3 233 267     Cr Clearance Estimated Creatinine Clearance: 66.8 mL/min (by C-G formula based on SCr of 0.61 mg/dL).  Coag     HbA1C No results found for: HGBA1C  Blood Glucose No results found for: POCGLU  Infection     CMP   Results from last 7 days   Lab Units  04/14/21  0349 04/13/21  1824   SODIUM mmol/L 139 139   POTASSIUM mmol/L 3.2* 3.3*   CHLORIDE mmol/L 103 101   CO2 mmol/L 22.0 27.0   BUN mg/dL 16 14   CREATININE mg/dL 0.61 0.78   GLUCOSE mg/dL 102* 105*   ALBUMIN g/dL  --  4.10   BILIRUBIN mg/dL  --  0.2   ALK PHOS U/L  --  87   AST (SGOT) U/L  --  20   ALT (SGPT) U/L  --  20   LIPASE U/L  --  28     ABG      UA      LINETTE  No results found for: POCMETH, POCAMPHET, POCBARBITUR, POCBENZO, POCCOCAINE, POCOPIATES, POCOXYCODO, POCPHENCYC, POCPROPOXY, POCTHC, POCTRICYC  Lysis Labs   Results from last 7 days   Lab Units 04/14/21  0349 04/13/21  1824   HEMOGLOBIN g/dL 13.1 14.7   PLATELETS 10*3/mm3 233 267   CREATININE mg/dL 0.61 0.78     Radiology(recent) XR Chest 1 View    Result Date: 4/13/2021  There is no significant change when compared to the prior study. There is no evidence for acute cardiopulmonary process.  Electronically Signed By-Carlos Tilley MD On:4/13/2021 7:42 PM This report was finalized on [id number] by  Carlos Tilley MD.        Results from last 7 days   Lab Units 04/13/21  2321   TROPONIN T ng/mL <0.010       Xrays, labs reviewed personally by physician.    ECG/EMG Results (most recent)     Procedure Component Value Units Date/Time    ECG 12 Lead [[id number]] Collected: 04/13/21 1815     Updated: 04/13/21 1816     QT Interval 313 ms     Narrative:      HEART RATE= 162  bpm  RR Interval= 372  ms  MD Interval= 54  ms  P Horizontal Axis=   deg  P Front Axis= 0  deg  QRSD Interval= 116  ms  QT Interval= 313  ms  QRS Axis= 58  deg  T Wave Axis= -50  deg  - ABNORMAL ECG -  Supraventricular tachycardia  Incomplete right bundle branch block  Inferior infarct, age indeterminate  Electronically Signed By:   Date and Time of Study: 2021-04-13 18:15:17    ECG 12 Lead [[id number]] Collected: 04/13/21 2251     Updated: 04/13/21 2253     QT Interval 342 ms     Narrative:      HEART RATE= 134  bpm  RR Interval= 440  ms  MD Interval=   ms  P Horizontal Axis=    deg  P Front Axis=   deg  QRSD Interval= 94  ms  QT Interval= 342  ms  QRS Axis= 81  deg  T Wave Axis= 62  deg  - ABNORMAL ECG -  Atrial flutter  Probable anterolateral infarct, acute  Prolonged QT interval  Electronically Signed By:   Date and Time of Study: 2021-04-13 22:51:17    ECG 12 Lead [[id number]] Collected: 04/13/21 1828     Updated: 04/14/21 0503     QT Interval 311 ms     Narrative:      HEART RATE= 149  bpm  RR Interval= 402  ms  AK Interval=   ms  P Horizontal Axis=   deg  P Front Axis=   deg  QRSD Interval= 88  ms  QT Interval= 311  ms  QRS Axis= 68  deg  T Wave Axis= 19  deg  - ABNORMAL ECG -  Atrial fibrillation  Electronically Signed By:   Date and Time of Study: 2021-04-13 18:28:06            Medication Review:   I have reviewed the patient's current medication list  Scheduled Meds:adenosine, 6 mg, Intravenous, Once  cholecalciferol, 1,000 Units, Oral, Daily  FLUoxetine, 40 mg, Oral, Daily  metoprolol tartrate, 12.5 mg, Oral, Q12H  pantoprazole, 40 mg, Intravenous, Q AM  sodium chloride, 10 mL, Intravenous, Q12H  vitamin B-12, 1,000 mcg, Oral, Daily      Continuous Infusions:dilTIAZem, 5-15 mg/hr, Last Rate: 10 mg/hr (04/14/21 0928)  lactated ringers, 100 mL/hr, Last Rate: 100 mL/hr (04/14/21 1020)      PRN Meds:.•  acetaminophen **OR** acetaminophen **OR** acetaminophen  •  ondansetron **OR** ondansetron  •  potassium chloride  •  potassium chloride  •  potassium chloride  •  [COMPLETED] Insert peripheral IV **AND** sodium chloride  •  sodium chloride    Imaging:  Imaging Results (Last 72 Hours)     Procedure Component Value Units Date/Time    CT Abdomen Pelvis Without Contrast [[id number]] Resulted: 04/14/21 1015     Updated: 04/14/21 1016    XR Chest 1 View [[id number]] Collected: 04/13/21 1942     Updated: 04/13/21 1945    Narrative:      XR CHEST 1 VW-     Date of Exam: 4/13/2021 7:31 PM     Indication: chest pain.  Possible Covid infection.      Comparison Exams: 05/18/2013.     Technique: AP  "view of the chest     FINDINGS:  No new consolidations or pleural effusions are observed. Chronic changes  are noted. The cardiac silhouette and mediastinum are stable. No acute  osseous abnormalities are identified.       Impression:      There is no significant change when compared to the prior study. There  is no evidence for acute cardiopulmonary process.     Electronically Signed By-Carlos Tilley MD On:4/13/2021 7:42 PM  This report was finalized on [id number] by  Carlos Tilley MD.            MONIK Null  04/14/21  10:24 EDT      EMR Dragon/Transcription:   \"Dictated utilizing Dragon dictation\".              Electronically signed by MONIK Null, 04/14/21, 10:24 AM EDT.      Patient seen and examined  Patient started having viral gastroenteritis and symptoms of abdominal discomfort and diarrhea.  Cultures are positive for norovirus.  Abdominal CT without significant pathology  Also incidentally noted to have palpitations for last 2 to 3 days.  Associate with fatigue without any angina  No prior history of arrhythmias.  Physical exam shows rapid atrial flutter and lungs are clear to auscultation and no peripheral edema neurological exam alert oriented x3.  EKG highly suspicious for counterclockwise right atrial flutter.  Digoxin has been stopped  Cardizem to be weaned off  Patient to be scheduled for atrial flutter ablation for symptomatic relief  Risks and benefits and outcomes educated  Labs x-rays reviewed      Electronically signed by Ori Tran MD, 04/14/21, 2:07 PM EDT.    "

## 2021-04-14 NOTE — ANESTHESIA PREPROCEDURE EVALUATION
Anesthesia Evaluation     Patient summary reviewed and Nursing notes reviewed   no history of anesthetic complications:  NPO Solid Status: > 8 hours  NPO Liquid Status: > 8 hours           Airway   Dental      Pulmonary    (+) a smoker Former,   Cardiovascular     (+) hypertension, dysrhythmias Atrial Flutter,       Neuro/Psych  (+) tremors,     GI/Hepatic/Renal/Endo    (+)   thyroid problem hyperthyroidism    Musculoskeletal     (+) back pain, myalgias, radiculopathy  Abdominal    Substance History      OB/GYN          Other        ROS/Med Hx Other: Aflutter/RVR, myofascial pain, palpitations    PSH  BACK SURGERY                  Anesthesia Plan    ASA 3     MAC   (Patient identified; pre-operative vital signs, all relevant labs/studies, complete medical/surgical/anesthetic history, full medication list, full allergy list, and NPO status obtained/reviewed; physical assessment performed; anesthetic options, side effects, potential complications, risks, and benefits discussed; questions answered; written anesthesia consent obtained; patient cleared for procedure; anesthesia machine and equipment checked and functioning)    Anesthetic plan, all risks, benefits, and alternatives have been provided, discussed and informed consent has been obtained with: patient.    Plan discussed with CRNA and CAA.

## 2021-04-14 NOTE — PROGRESS NOTES
"      [facility] Medicine Services Daily Progress Note      Hospitalist Team  LOS 0 days      Patient Care Team:  Chloe Segura MD as PCP - General    Patient Location: 104/1      Subjective   Subjective     Chief Complaint / Subjective  Chief Complaint   Patient presents with   • Rapid Heart Rate     Patient reporting still having loose stool.  CT abdomen pelvis unremarkable GI panel pending.  No shortness of breath no chest pain at this time.  Patient still with heart rate in 140s to 150s showing atrial flutter, getting beta-blocker and calcium channel blockers.  Cardiology evaluating patient electrophysiology consulted.  Echo obtained and read pending.    Brief Synopsis of Hospital Course/HPI  66-year-old female presents to the ER with a chief complaint of identified accelerated heart rate, atrial fibrillation at the urgent care center today.  The patient had gone to the urgent care center secondary to acute epigastric pain with vomiting x2 days.  The patient states she had not felt well for several days with epigastric discomfort with radiation to the back.  Sunday she started vomiting and vomited multiple times for 2 days without any hematemesis.  The patient states her  insisted she seek medical attention today so she went to the urgent care center where she was noted to have atrial fibrillation with RVR.  Patient denies any chest pain, subjective fever or chills, shortness of breath.  The patient does report change in her stool which she describes as flat and ribbony with episodes of blood mixed with mucus.  The patient reports she had a colonoscopy a little over 10 years ago and was told that it was \"clean\" with recommendation for 10-year follow-up.  The patient states she did not notice she had irregular heartbeat or accelerated heartbeat until it was found at the urgent care center.     Review of records: The patient was seen by her primary care provider January 2021 " "and restarted on blood pressure medicine, amlodipine and medication for depression, Prozac.  The patient had stopped taking these medications for a couple of years but was ready to get restarted on them.  She had follow-up appointment February 2021 with increase of her amlodipine from 5 mg to 10 mg daily and increase of her Prozac from 20 mg daily to 40 mg daily.      Date::          Review of Systems   Constitutional: Positive for malaise/fatigue. Negative for chills and fever.   HENT: Negative for hoarse voice and stridor.    Eyes: Negative for double vision and photophobia.   Cardiovascular: Positive for dyspnea on exertion and irregular heartbeat. Negative for chest pain.   Respiratory: Negative for cough and shortness of breath.    Musculoskeletal: Negative for falls and stiffness.   Gastrointestinal: Positive for abdominal pain and diarrhea. Negative for nausea and vomiting.   Genitourinary: Negative for dysuria and flank pain.   Neurological: Positive for weakness. Negative for dizziness.   Psychiatric/Behavioral: Negative for altered mental status. The patient is not nervous/anxious.          Objective   Objective      Vital Signs  Temp:  [97.9 °F (36.6 °C)-98.4 °F (36.9 °C)] 98.4 °F (36.9 °C)  Heart Rate:  [] 144  Resp:  [14-24] 16  BP: ()/(49-98) 96/65  Oxygen Therapy  SpO2: 93 %  Pulse Oximetry Type: Continuous  Device (Oxygen Therapy): room air  Flow (L/min): 2  Flowsheet Rows      First Filed Value   Admission Height  162.6 cm (64\") Documented at 04/13/2021 1806   Admission Weight  72 kg (158 lb 11.7 oz) Documented at 04/13/2021 1806        Intake & Output (last 3 days)     None        Lines, Drains & Airways    Active LDAs     Name:   Placement date:   Placement time:   Site:   Days:    Peripheral IV 04/13/21 1814 Right Antecubital   04/13/21 1814    Antecubital   less than 1    Peripheral IV 04/13/21 2322 Left Forearm   04/13/21 2322    Forearm   less than 1                  Physical " Exam:    Physical Exam    General: Elderly female lying in bed breathing comfortably on room air no acute distress  HEENT: NC/AT, EOMI, mucosa moist  Heart: Tachycardic, irregular  Chest: Normal work of breathing moving air well no wheezing or crackles  Abdominal: Soft.  Mild distention, nontender  Musculoskeletal: Normal ROM.  No cyanosis. No calf tenderness.  Neurological: AAOx3, no focal deficits  Skin: Skin is warm and dry. No rash  Psychiatric: Normal mood and affect.        Procedures:              Results Review:     I reviewed the patient's new clinical results.      Lab Results (last 24 hours)     Procedure Component Value Units Date/Time    Gastrointestinal Panel, PCR - Stool, Per Rectum [[id number]]  (Abnormal) Collected: 04/14/21 0942    Specimen: Stool from Per Rectum Updated: 04/14/21 1138     Campylobacter Not Detected     Plesiomonas shigelloides Not Detected     Salmonella Not Detected     Vibrio Not Detected     Vibrio cholerae Not Detected     Yersinia enterocolitica Not Detected     Enteroaggregative E. coli (EAEC) Not Detected     Enteropathogenic E. coli (EPEC) Not Detected     Enterotoxigenic E. coli (ETEC) lt/st Not Detected     Shiga-like toxin-producing E. coli (STEC) stx1/stx2 Not Detected     Shigella/Enteroinvasive E. coli (EIEC) Not Detected     Cryptosporidium Not Detected     Cyclospora cayetanensis Not Detected     Entamoeba histolytica Not Detected     Giardia lamblia Not Detected     Adenovirus F40/41 Not Detected     Astrovirus Not Detected     Norovirus GI/GII Detected     Rotavirus A Not Detected     Sapovirus (I, II, IV or V) Not Detected    Magnesium [[id number]]  (Normal) Collected: 04/14/21 0349    Specimen: Blood Updated: 04/14/21 1100     Magnesium 1.8 mg/dL     Basic Metabolic Panel [[id number]]  (Abnormal) Collected: 04/14/21 0349    Specimen: Blood Updated: 04/14/21 0455     Glucose 102 mg/dL      BUN 16 mg/dL      Creatinine 0.61 mg/dL      Sodium 139 mmol/L       Potassium 3.2 mmol/L      Chloride 103 mmol/L      CO2 22.0 mmol/L      Calcium 8.1 mg/dL      eGFR Non African Amer 98 mL/min/1.73      BUN/Creatinine Ratio 26.2     Anion Gap 14.0 mmol/L     Narrative:      GFR Normal >60  Chronic Kidney Disease <60  Kidney Failure <15      CBC Auto Differential [[id number]]  (Normal) Collected: 04/14/21 0349    Specimen: Blood Updated: 04/14/21 0429     WBC 8.00 10*3/mm3      RBC 4.58 10*6/mm3      Hemoglobin 13.1 g/dL      Hematocrit 39.0 %      MCV 85.2 fL      MCH 28.7 pg      MCHC 33.7 g/dL      RDW 14.1 %      RDW-SD 42.0 fl      MPV 10.6 fL      Platelets 233 10*3/mm3      Neutrophil % 69.3 %      Lymphocyte % 21.4 %      Monocyte % 7.7 %      Eosinophil % 1.3 %      Basophil % 0.3 %      Neutrophils, Absolute 5.60 10*3/mm3      Lymphocytes, Absolute 1.70 10*3/mm3      Monocytes, Absolute 0.60 10*3/mm3      Eosinophils, Absolute 0.10 10*3/mm3      Basophils, Absolute 0.00 10*3/mm3      nRBC 0.2 /100 WBC     Urine Drug Screen - Urine, Clean Catch [[id number]]  (Normal) Collected: 04/14/21 0024    Specimen: Urine, Clean Catch Updated: 04/14/21 0050     Amphet/Methamphet, Screen Negative     Barbiturates Screen, Urine Negative     Benzodiazepine Screen, Urine Negative     Cocaine Screen, Urine Negative     Opiate Screen Negative     THC, Screen, Urine Negative     Methadone Screen, Urine Negative     Oxycodone Screen, Urine Negative    Narrative:      Negative Thresholds Per Drugs Screened:    Amphetamines                 500 ng/ml  Barbiturates                 200 ng/ml  Benzodiazepines              100 ng/ml  Cocaine                      300 ng/ml  Methadone                    300 ng/ml  Opiates                      300 ng/ml  Oxycodone                    100 ng/ml  THC                           50 ng/ml    The Normal Value for all drugs tested is negative. This report includes final unconfirmed screening results to be used for medical treatment purposes only. Unconfirmed  results must not be used for non-medical purposes such as employment or legal testing. Clinical consideration should be applied to any drug of abuse test, particularly when unconfirmed results are used.          All urine drugs of abuse requests without chain of custody are for medical screening purposes only.  False positives are possible.      Troponin [[id number]]  (Normal) Collected: 04/13/21 2321    Specimen: Blood Updated: 04/13/21 2347     Troponin T <0.010 ng/mL     Narrative:      Troponin T Reference Range:  <= 0.03 ng/mL-   Negative for AMI  >0.03 ng/mL-     Abnormal for myocardial necrosis.  Clinicians would have to utilize clinical acumen, EKG, Troponin and serial changes to determine if it is an Acute Myocardial Infarction or myocardial injury due to an underlying chronic condition.       Results may be falsely decreased if patient taking Biotin.      COVID-19, ABBOTT IN-HOUSE,NASAL Swab (NO TRANSPORT MEDIA) 2 HR TAT - Swab, Nasopharynx [[id number]]  (Normal) Collected: 04/1955    Specimen: Swab from Nasopharynx Updated: 04/13/21 2031     COVID19 Presumptive Negative    Narrative:      Fact sheet for providers: https://www.fda.gov/media/758759/download     Fact sheet for patients: https://www.fda.gov/media/994932/download    Test performed by PCR.  If inconsistent with clinical signs and symptoms patient should be tested with different authorized molecular test.    Lipase [[id number]]  (Normal) Collected: 04/13/21 1824    Specimen: Blood Updated: 04/13/21 1934     Lipase 28 U/L     Extra Tubes [[id number]] Collected: 04/13/21 1824    Specimen: Blood, Venous Line Updated: 04/13/21 1930    Narrative:      The following orders were created for panel order Extra Tubes.  Procedure                               Abnormality         Status                     ---------                               -----------         ------                     Light Blue Top[[id number]]                                    Final result               Gold Top - SST[[id number]]                                   Final result                 Please view results for these tests on the individual orders.    Light Blue Top [[id number]] Collected: 04/13/21 1824    Specimen: Blood Updated: 04/13/21 1930     Extra Tube hold for add-on     Comment: Auto resulted       Gold Top - SST [[id number]] Collected: 04/13/21 1824    Specimen: Blood Updated: 04/13/21 1930     Extra Tube Hold for add-ons.     Comment: Auto resulted.       Comprehensive Metabolic Panel [[id number]]  (Abnormal) Collected: 04/13/21 1824    Specimen: Blood Updated: 04/13/21 1928     Glucose 105 mg/dL      BUN 14 mg/dL      Creatinine 0.78 mg/dL      Sodium 139 mmol/L      Potassium 3.3 mmol/L      Chloride 101 mmol/L      CO2 27.0 mmol/L      Calcium 9.0 mg/dL      Total Protein 6.8 g/dL      Albumin 4.10 g/dL      ALT (SGPT) 20 U/L      AST (SGOT) 20 U/L      Alkaline Phosphatase 87 U/L      Total Bilirubin 0.2 mg/dL      eGFR Non African Amer 74 mL/min/1.73      Globulin 2.7 gm/dL      A/G Ratio 1.5 g/dL      BUN/Creatinine Ratio 17.9     Anion Gap 11.0 mmol/L     Narrative:      GFR Normal >60  Chronic Kidney Disease <60  Kidney Failure <15      Troponin [[id number]]  (Normal) Collected: 04/13/21 1824    Specimen: Blood Updated: 04/13/21 1928     Troponin T <0.010 ng/mL     Narrative:      Troponin T Reference Range:  <= 0.03 ng/mL-   Negative for AMI  >0.03 ng/mL-     Abnormal for myocardial necrosis.  Clinicians would have to utilize clinical acumen, EKG, Troponin and serial changes to determine if it is an Acute Myocardial Infarction or myocardial injury due to an underlying chronic condition.       Results may be falsely decreased if patient taking Biotin.      BNP [[id number]]  (Abnormal) Collected: 04/13/21 1824    Specimen: Blood Updated: 04/13/21 1924     proBNP 1,508.0 pg/mL     Narrative:      Among patients with dyspnea, NT-proBNP is highly sensitive for the detection  of acute congestive heart failure. In addition NT-proBNP of <300 pg/ml effectively rules out acute congestive heart failure with 99% negative predictive value.    Results may be falsely decreased if patient taking Biotin.      CBC & Differential [[id number]]  (Normal) Collected: 04/13/21 1824    Specimen: Blood Updated: 04/13/21 1854    Narrative:      The following orders were created for panel order CBC & Differential.  Procedure                               Abnormality         Status                     ---------                               -----------         ------                     CBC Auto Differential[[id number]]        Normal              Final result                 Please view results for these tests on the individual orders.    CBC Auto Differential [[id number]]  (Normal) Collected: 04/13/21 1824    Specimen: Blood Updated: 04/13/21 1854     WBC 6.70 10*3/mm3      RBC 5.16 10*6/mm3      Hemoglobin 14.7 g/dL      Hematocrit 43.9 %      MCV 85.1 fL      MCH 28.5 pg      MCHC 33.5 g/dL      RDW 14.2 %      RDW-SD 42.9 fl      MPV 9.9 fL      Platelets 267 10*3/mm3      Neutrophil % 64.9 %      Lymphocyte % 24.6 %      Monocyte % 9.1 %      Eosinophil % 1.0 %      Basophil % 0.4 %      Neutrophils, Absolute 4.30 10*3/mm3      Lymphocytes, Absolute 1.60 10*3/mm3      Monocytes, Absolute 0.60 10*3/mm3      Eosinophils, Absolute 0.10 10*3/mm3      Basophils, Absolute 0.00 10*3/mm3      nRBC 0.0 /100 WBC         No results found for: HGBA1C            Lab Results   Component Value Date    LIPASE 28 04/13/2021     Lab Results   Component Value Date    CHOL 202 (H) 01/22/2021    TRIG 119 01/22/2021    HDL 69 (H) 01/22/2021     (H) 01/22/2021       No results found for: INTRAOP, PREDX, FINALDX, COMDX    Microbiology Results (last 10 days)     Procedure Component Value - Date/Time    Gastrointestinal Panel, PCR - Stool, Per Rectum [[id number]]  (Abnormal) Collected: 04/14/21 0942    Lab Status: Final result  Specimen: Stool from Per Rectum Updated: 04/14/21 1138     Campylobacter Not Detected     Plesiomonas shigelloides Not Detected     Salmonella Not Detected     Vibrio Not Detected     Vibrio cholerae Not Detected     Yersinia enterocolitica Not Detected     Enteroaggregative E. coli (EAEC) Not Detected     Enteropathogenic E. coli (EPEC) Not Detected     Enterotoxigenic E. coli (ETEC) lt/st Not Detected     Shiga-like toxin-producing E. coli (STEC) stx1/stx2 Not Detected     Shigella/Enteroinvasive E. coli (EIEC) Not Detected     Cryptosporidium Not Detected     Cyclospora cayetanensis Not Detected     Entamoeba histolytica Not Detected     Giardia lamblia Not Detected     Adenovirus F40/41 Not Detected     Astrovirus Not Detected     Norovirus GI/GII Detected     Rotavirus A Not Detected     Sapovirus (I, II, IV or V) Not Detected    COVID-19, ABBOTT IN-HOUSE,NASAL Swab (NO TRANSPORT MEDIA) 2 HR TAT - Swab, Nasopharynx [[id number]]  (Normal) Collected: 04/1955    Lab Status: Final result Specimen: Swab from Nasopharynx Updated: 04/13/21 2031     COVID19 Presumptive Negative    Narrative:      Fact sheet for providers: https://www.fda.gov/media/858505/download     Fact sheet for patients: https://www.fda.gov/media/732356/download    Test performed by PCR.  If inconsistent with clinical signs and symptoms patient should be tested with different authorized molecular test.          ECG/EMG Results (most recent)     Procedure Component Value Units Date/Time    ECG 12 Lead [[id number]] Collected: 04/13/21 1815     Updated: 04/13/21 1816     QT Interval 313 ms     Narrative:      HEART RATE= 162  bpm  RR Interval= 372  ms  ND Interval= 54  ms  P Horizontal Axis=   deg  P Front Axis= 0  deg  QRSD Interval= 116  ms  QT Interval= 313  ms  QRS Axis= 58  deg  T Wave Axis= -50  deg  - ABNORMAL ECG -  Supraventricular tachycardia  Incomplete right bundle branch block  Inferior infarct, age indeterminate  Electronically  Signed By:   Date and Time of Study: 2021-04-13 18:15:17    ECG 12 Lead [[id number]] Collected: 04/13/21 2251     Updated: 04/13/21 2253     QT Interval 342 ms     Narrative:      HEART RATE= 134  bpm  RR Interval= 440  ms  MN Interval=   ms  P Horizontal Axis=   deg  P Front Axis=   deg  QRSD Interval= 94  ms  QT Interval= 342  ms  QRS Axis= 81  deg  T Wave Axis= 62  deg  - ABNORMAL ECG -  Atrial flutter  Probable anterolateral infarct, acute  Prolonged QT interval  Electronically Signed By:   Date and Time of Study: 2021-04-13 22:51:17    ECG 12 Lead [[id number]] Collected: 04/13/21 1828     Updated: 04/14/21 0503     QT Interval 311 ms     Narrative:      HEART RATE= 149  bpm  RR Interval= 402  ms  MN Interval=   ms  P Horizontal Axis=   deg  P Front Axis=   deg  QRSD Interval= 88  ms  QT Interval= 311  ms  QRS Axis= 68  deg  T Wave Axis= 19  deg  - ABNORMAL ECG -  Atrial fibrillation  Electronically Signed By:   Date and Time of Study: 2021-04-13 18:28:06    Adult Transthoracic Echo Complete W/ Cont if Necessary Per Protocol [[id number]] Resulted: 04/14/21 1114     Updated: 04/14/21 1114     Target HR (85%) 131 bpm      Max. Pred. HR (100%) 154 bpm                   CT Abdomen Pelvis Without Contrast    Result Date: 4/14/2021  1.No acute process identified within abdomen/pelivs. 2.Nonobstructing left renal stone.    Electronically Signed By-Carlos Molina MD On:4/14/2021 10:38 AM This report was finalized on [id number] by  Carlos Molina MD.    XR Chest 1 View    Result Date: 4/13/2021  There is no significant change when compared to the prior study. There is no evidence for acute cardiopulmonary process.  Electronically Signed By-Carlos Tilley MD On:4/13/2021 7:42 PM This report was finalized on [id number] by  Carlos Tilley MD.          Xrays, labs reviewed personally by physician.    Medication Review:   I have reviewed the patient's current medication list      Scheduled Meds  adenosine, 6 mg,  Intravenous, Once  cholecalciferol, 1,000 Units, Oral, Daily  FLUoxetine, 40 mg, Oral, Daily  metoprolol tartrate, 12.5 mg, Oral, Q12H  pantoprazole, 40 mg, Intravenous, Q AM  sodium chloride, 10 mL, Intravenous, Q12H  vitamin B-12, 1,000 mcg, Oral, Daily        Meds Infusions  dilTIAZem, 5-15 mg/hr, Last Rate: 10 mg/hr (04/14/21 1117)  lactated ringers, 100 mL/hr, Last Rate: 100 mL/hr (04/14/21 1020)        Meds PRN  •  acetaminophen **OR** acetaminophen **OR** acetaminophen  •  ondansetron **OR** ondansetron  •  potassium chloride  •  potassium chloride  •  potassium chloride  •  [COMPLETED] Insert peripheral IV **AND** sodium chloride  •  sodium chloride        Assessment/Plan   Assessment/Plan     Active Hospital Problems    Diagnosis  POA   • **New onset a-fib (CMS/HCC) [I48.91]  Yes   • Gastroesophageal reflux disease [K21.9]  Yes   • Chronic pain disorder [G89.4]  Yes   • Hypertension [I10]  Yes   • Hyperlipidemia [E78.5]  Yes      Resolved Hospital Problems   No resolved problems to display.       MEDICAL DECISION MAKING COMPLEXITY BY PROBLEM:     Tachyarrhythmia-patient presented to the emergency department after being seen in urgent care center for GI concerns.  Patient advised to seek care in the emergency department after finding of patient's tachyarrhythmia, patient found to be in atrial flutter  -Telemetry  -Cardiology consulted  -Monitor electrolytes  -Echo obtained  -Cardizem, beta-blocker given  -Electrophysiology consulted  -Troponins negative  -Monitor for possible causes of tachyarrhythmia including GI infection  -Patient's with mildly elevated BNP which may be secondary to tachyarrhythmia  -Patient on Lovenox therapeutic    Nausea/vomiting-patient noted loose stool and abdominal pain intermittently this hospital stay patient reports GI issues on and off for multiple weeks.  Patient positive for Norovirus  -Antiemetics  -Patient showing signs of volume depletion, starting IV fluids with bolus  given  -Monitor electrolytes  -CT abdomen pelvis showing no acute pathology  -PPI  -Lipase normal  -GI panel positive for norovirus    Hypokalemia-most likely secondary to GI loss  -Replace    Hypertension/anxiety/depression/vitamin deficiencies-chronic in nature  -Resume home medication as clinically appropriate      VTE Prophylaxis -   Mechanical Order History:     None      Pharmalogical Order History:      Ordered     Dose Route Frequency Stop    04/14/21 0601  enoxaparin (LOVENOX) syringe 70 mg  Status:  Discontinued      1 mg/kg SC Every 12 Hours 04/14/21 0913 04/13/21 2237  enoxaparin (LOVENOX) syringe 70 mg      70 mg SC Once 04/14/21 0059 04/13/21 2235  Pharmacy to Dose enoxaparin (LOVENOX)  Status:  Discontinued     Question:  Indication of use  Answer:  Atrial Fibrillation - requiring full anticoagulation    -- XX Continuous PRN 04/14/21 0929                  Code Status -   Code Status and Medical Interventions:   Ordered at: 04/13/21 2235     Code Status:    CPR     Medical Interventions (Level of Support Prior to Arrest):    Full       This patient has been examined wearing appropriate Personal Protective Equipment and discussed with hospital infection control department. 04/14/21        Discharge Planning  pending        Electronically signed by Jacques Diamond MD, 04/14/21, 12:08 EDT.  [facility] Hospitalist Team

## 2021-04-15 LAB
ANION GAP SERPL CALCULATED.3IONS-SCNC: 8 MMOL/L (ref 5–15)
BASOPHILS # BLD AUTO: 0 10*3/MM3 (ref 0–0.2)
BASOPHILS NFR BLD AUTO: 0.5 % (ref 0–1.5)
BH CV ECHO MEAS - ACS: 2.2 CM
BH CV ECHO MEAS - AO MAX PG (FULL): 2.5 MMHG
BH CV ECHO MEAS - AO MAX PG: 5.8 MMHG
BH CV ECHO MEAS - AO MEAN PG (FULL): 0.82 MMHG
BH CV ECHO MEAS - AO MEAN PG: 2.7 MMHG
BH CV ECHO MEAS - AO ROOT AREA (BSA CORRECTED): 1.5
BH CV ECHO MEAS - AO ROOT AREA: 5.8 CM^2
BH CV ECHO MEAS - AO ROOT DIAM: 2.7 CM
BH CV ECHO MEAS - AO V2 MAX: 120.5 CM/SEC
BH CV ECHO MEAS - AO V2 MEAN: 74.9 CM/SEC
BH CV ECHO MEAS - AO V2 VTI: 16.3 CM
BH CV ECHO MEAS - AORTIC HR: 138.3 BPM
BH CV ECHO MEAS - AORTIC R-R: 0.43 SEC
BH CV ECHO MEAS - ASC AORTA: 2.5 CM
BH CV ECHO MEAS - AVA(I,A): 2.4 CM^2
BH CV ECHO MEAS - AVA(I,D): 2.4 CM^2
BH CV ECHO MEAS - AVA(V,A): 2 CM^2
BH CV ECHO MEAS - AVA(V,D): 2 CM^2
BH CV ECHO MEAS - BSA(HAYCOCK): 1.8 M^2
BH CV ECHO MEAS - BSA: 1.8 M^2
BH CV ECHO MEAS - BZI_BMI: 26.8 KILOGRAMS/M^2
BH CV ECHO MEAS - BZI_METRIC_HEIGHT: 162.6 CM
BH CV ECHO MEAS - BZI_METRIC_WEIGHT: 70.8 KG
BH CV ECHO MEAS - CI(AO): 7.4 L/MIN/M^2
BH CV ECHO MEAS - CI(LVOT): 3 L/MIN/M^2
BH CV ECHO MEAS - CO(AO): 13 L/MIN
BH CV ECHO MEAS - CO(LVOT): 5.4 L/MIN
BH CV ECHO MEAS - EDV(CUBED): 47.2 ML
BH CV ECHO MEAS - EDV(MOD-SP2): 43.6 ML
BH CV ECHO MEAS - EDV(MOD-SP4): 63.9 ML
BH CV ECHO MEAS - EDV(TEICH): 55 ML
BH CV ECHO MEAS - EF(CUBED): 66.1 %
BH CV ECHO MEAS - EF(MOD-BP): 57 %
BH CV ECHO MEAS - EF(MOD-SP2): 60.8 %
BH CV ECHO MEAS - EF(MOD-SP4): 51.1 %
BH CV ECHO MEAS - EF(TEICH): 58.6 %
BH CV ECHO MEAS - ESV(CUBED): 16 ML
BH CV ECHO MEAS - ESV(MOD-SP2): 17.1 ML
BH CV ECHO MEAS - ESV(MOD-SP4): 31.2 ML
BH CV ECHO MEAS - ESV(TEICH): 22.8 ML
BH CV ECHO MEAS - FS: 30.3 %
BH CV ECHO MEAS - IVS/LVPW: 1.5
BH CV ECHO MEAS - IVSD: 1.5 CM
BH CV ECHO MEAS - LA DIMENSION(2D): 3.5 CM
BH CV ECHO MEAS - LV DIASTOLIC VOL/BSA (35-75): 36.3 ML/M^2
BH CV ECHO MEAS - LV MASS(C)D: 146.7 GRAMS
BH CV ECHO MEAS - LV MASS(C)DI: 83.4 GRAMS/M^2
BH CV ECHO MEAS - LV MAX PG: 3.3 MMHG
BH CV ECHO MEAS - LV MEAN PG: 1.9 MMHG
BH CV ECHO MEAS - LV SYSTOLIC VOL/BSA (12-30): 17.7 ML/M^2
BH CV ECHO MEAS - LV V1 MAX: 91.5 CM/SEC
BH CV ECHO MEAS - LV V1 MEAN: 63.7 CM/SEC
BH CV ECHO MEAS - LV V1 VTI: 14.8 CM
BH CV ECHO MEAS - LVIDD: 3.6 CM
BH CV ECHO MEAS - LVIDS: 2.5 CM
BH CV ECHO MEAS - LVOT AREA: 2.6 CM^2
BH CV ECHO MEAS - LVOT DIAM: 1.8 CM
BH CV ECHO MEAS - LVPWD: 1 CM
BH CV ECHO MEAS - MR MAX PG: 76.7 MMHG
BH CV ECHO MEAS - MR MAX VEL: 437.8 CM/SEC
BH CV ECHO MEAS - MV A MAX VEL: 0.47 CM/SEC
BH CV ECHO MEAS - MV DEC SLOPE: 787.9 CM/SEC^2
BH CV ECHO MEAS - MV DEC TIME: 0.16 SEC
BH CV ECHO MEAS - MV E MAX VEL: 129.4 CM/SEC
BH CV ECHO MEAS - MV E/A: 274
BH CV ECHO MEAS - MV MAX PG: 9.2 MMHG
BH CV ECHO MEAS - MV MEAN PG: 3.7 MMHG
BH CV ECHO MEAS - MV V2 MAX: 152 CM/SEC
BH CV ECHO MEAS - MV V2 MEAN: 84 CM/SEC
BH CV ECHO MEAS - MV V2 VTI: 21.7 CM
BH CV ECHO MEAS - MVA(VTI): 1.8 CM^2
BH CV ECHO MEAS - PA ACC TIME: 0.08 SEC
BH CV ECHO MEAS - PA MAX PG (FULL): 0.94 MMHG
BH CV ECHO MEAS - PA MAX PG: 2.2 MMHG
BH CV ECHO MEAS - PA MEAN PG (FULL): 0.62 MMHG
BH CV ECHO MEAS - PA MEAN PG: 1.3 MMHG
BH CV ECHO MEAS - PA PR(ACCEL): 43 MMHG
BH CV ECHO MEAS - PA V2 MAX: 73.4 CM/SEC
BH CV ECHO MEAS - PA V2 MEAN: 55.5 CM/SEC
BH CV ECHO MEAS - PA V2 VTI: 15.6 CM
BH CV ECHO MEAS - PVA(I,A): 3 CM^2
BH CV ECHO MEAS - PVA(I,D): 3 CM^2
BH CV ECHO MEAS - PVA(V,A): 3.3 CM^2
BH CV ECHO MEAS - PVA(V,D): 3.3 CM^2
BH CV ECHO MEAS - QP/QS: 1.2
BH CV ECHO MEAS - RAP SYSTOLE: 3 MMHG
BH CV ECHO MEAS - RV MAX PG: 1.2 MMHG
BH CV ECHO MEAS - RV MEAN PG: 0.7 MMHG
BH CV ECHO MEAS - RV V1 MAX: 55.1 CM/SEC
BH CV ECHO MEAS - RV V1 MEAN: 39.6 CM/SEC
BH CV ECHO MEAS - RV V1 VTI: 10.5 CM
BH CV ECHO MEAS - RVDD: 3 CM
BH CV ECHO MEAS - RVOT AREA: 4.4 CM^2
BH CV ECHO MEAS - RVOT DIAM: 2.4 CM
BH CV ECHO MEAS - RVSP: 29 MMHG
BH CV ECHO MEAS - SI(AO): 53.4 ML/M^2
BH CV ECHO MEAS - SI(CUBED): 17.7 ML/M^2
BH CV ECHO MEAS - SI(LVOT): 22 ML/M^2
BH CV ECHO MEAS - SI(MOD-SP2): 15.1 ML/M^2
BH CV ECHO MEAS - SI(MOD-SP4): 18.6 ML/M^2
BH CV ECHO MEAS - SI(TEICH): 18.3 ML/M^2
BH CV ECHO MEAS - SV(AO): 93.9 ML
BH CV ECHO MEAS - SV(CUBED): 31.2 ML
BH CV ECHO MEAS - SV(LVOT): 38.7 ML
BH CV ECHO MEAS - SV(MOD-SP2): 26.5 ML
BH CV ECHO MEAS - SV(MOD-SP4): 32.7 ML
BH CV ECHO MEAS - SV(RVOT): 46.7 ML
BH CV ECHO MEAS - SV(TEICH): 32.2 ML
BH CV ECHO MEAS - TR MAX VEL: 254.2 CM/SEC
BUN SERPL-MCNC: 9 MG/DL (ref 8–23)
BUN/CREAT SERPL: 14.5 (ref 7–25)
CALCIUM SPEC-SCNC: 8 MG/DL (ref 8.6–10.5)
CHLORIDE SERPL-SCNC: 108 MMOL/L (ref 98–107)
CO2 SERPL-SCNC: 23 MMOL/L (ref 22–29)
CREAT SERPL-MCNC: 0.62 MG/DL (ref 0.57–1)
DEPRECATED RDW RBC AUTO: 43.8 FL (ref 37–54)
EOSINOPHIL # BLD AUTO: 0.1 10*3/MM3 (ref 0–0.4)
EOSINOPHIL NFR BLD AUTO: 1.2 % (ref 0.3–6.2)
ERYTHROCYTE [DISTWIDTH] IN BLOOD BY AUTOMATED COUNT: 14.4 % (ref 12.3–15.4)
GFR SERPL CREATININE-BSD FRML MDRD: 96 ML/MIN/1.73
GLUCOSE SERPL-MCNC: 106 MG/DL (ref 65–99)
HCT VFR BLD AUTO: 33.6 % (ref 34–46.6)
HGB BLD-MCNC: 11.2 G/DL (ref 12–15.9)
LYMPHOCYTES # BLD AUTO: 1.3 10*3/MM3 (ref 0.7–3.1)
LYMPHOCYTES NFR BLD AUTO: 25.4 % (ref 19.6–45.3)
MAGNESIUM SERPL-MCNC: 1.7 MG/DL (ref 1.6–2.4)
MCH RBC QN AUTO: 28.9 PG (ref 26.6–33)
MCHC RBC AUTO-ENTMCNC: 33.5 G/DL (ref 31.5–35.7)
MCV RBC AUTO: 86.3 FL (ref 79–97)
MONOCYTES # BLD AUTO: 0.4 10*3/MM3 (ref 0.1–0.9)
MONOCYTES NFR BLD AUTO: 8.7 % (ref 5–12)
NEUTROPHILS NFR BLD AUTO: 3.2 10*3/MM3 (ref 1.7–7)
NEUTROPHILS NFR BLD AUTO: 64.2 % (ref 42.7–76)
NRBC BLD AUTO-RTO: 0 /100 WBC (ref 0–0.2)
PLATELET # BLD AUTO: 187 10*3/MM3 (ref 140–450)
PMV BLD AUTO: 10.6 FL (ref 6–12)
POTASSIUM SERPL-SCNC: 3.6 MMOL/L (ref 3.5–5.2)
POTASSIUM SERPL-SCNC: 4.4 MMOL/L (ref 3.5–5.2)
RBC # BLD AUTO: 3.89 10*6/MM3 (ref 3.77–5.28)
SODIUM SERPL-SCNC: 139 MMOL/L (ref 136–145)
WBC # BLD AUTO: 5.1 10*3/MM3 (ref 3.4–10.8)

## 2021-04-15 PROCEDURE — 80048 BASIC METABOLIC PNL TOTAL CA: CPT | Performed by: INTERNAL MEDICINE

## 2021-04-15 PROCEDURE — 84132 ASSAY OF SERUM POTASSIUM: CPT | Performed by: FAMILY MEDICINE

## 2021-04-15 PROCEDURE — 83735 ASSAY OF MAGNESIUM: CPT | Performed by: NURSE PRACTITIONER

## 2021-04-15 PROCEDURE — 25010000002 MAGNESIUM SULFATE 2 GM/50ML SOLUTION: Performed by: FAMILY MEDICINE

## 2021-04-15 PROCEDURE — 85025 COMPLETE CBC W/AUTO DIFF WBC: CPT | Performed by: INTERNAL MEDICINE

## 2021-04-15 PROCEDURE — 93005 ELECTROCARDIOGRAM TRACING: CPT | Performed by: INTERNAL MEDICINE

## 2021-04-15 PROCEDURE — 99233 SBSQ HOSP IP/OBS HIGH 50: CPT | Performed by: INTERNAL MEDICINE

## 2021-04-15 PROCEDURE — 99232 SBSQ HOSP IP/OBS MODERATE 35: CPT | Performed by: FAMILY MEDICINE

## 2021-04-15 PROCEDURE — 99232 SBSQ HOSP IP/OBS MODERATE 35: CPT | Performed by: INTERNAL MEDICINE

## 2021-04-15 RX ORDER — MAGNESIUM SULFATE HEPTAHYDRATE 40 MG/ML
2 INJECTION, SOLUTION INTRAVENOUS ONCE
Status: COMPLETED | OUTPATIENT
Start: 2021-04-15 | End: 2021-04-15

## 2021-04-15 RX ORDER — DILTIAZEM HYDROCHLORIDE 60 MG/1
60 TABLET, FILM COATED ORAL EVERY 8 HOURS SCHEDULED
Status: DISCONTINUED | OUTPATIENT
Start: 2021-04-15 | End: 2021-04-16 | Stop reason: HOSPADM

## 2021-04-15 RX ORDER — METOPROLOL SUCCINATE 25 MG/1
25 TABLET, EXTENDED RELEASE ORAL
Status: DISCONTINUED | OUTPATIENT
Start: 2021-04-15 | End: 2021-04-16 | Stop reason: HOSPADM

## 2021-04-15 RX ADMIN — POTASSIUM CHLORIDE 40 MEQ: 1500 TABLET, EXTENDED RELEASE ORAL at 12:34

## 2021-04-15 RX ADMIN — SODIUM CHLORIDE 10 MG/HR: 900 INJECTION, SOLUTION INTRAVENOUS at 16:55

## 2021-04-15 RX ADMIN — DRONEDARONE 400 MG: 400 TABLET, FILM COATED ORAL at 21:04

## 2021-04-15 RX ADMIN — HYDROCODONE BITARTRATE AND ACETAMINOPHEN 1 TABLET: 5; 325 TABLET ORAL at 08:06

## 2021-04-15 RX ADMIN — DILTIAZEM HYDROCHLORIDE 60 MG: 60 TABLET, FILM COATED ORAL at 21:04

## 2021-04-15 RX ADMIN — ACETAMINOPHEN 650 MG: 325 TABLET, FILM COATED ORAL at 22:23

## 2021-04-15 RX ADMIN — DRONEDARONE 400 MG: 400 TABLET, FILM COATED ORAL at 10:40

## 2021-04-15 RX ADMIN — Medication 1000 UNITS: at 08:06

## 2021-04-15 RX ADMIN — MAGNESIUM SULFATE HEPTAHYDRATE 2 G: 40 INJECTION, SOLUTION INTRAVENOUS at 12:34

## 2021-04-15 RX ADMIN — PANTOPRAZOLE SODIUM 40 MG: 40 INJECTION, POWDER, FOR SOLUTION INTRAVENOUS at 05:56

## 2021-04-15 RX ADMIN — CYANOCOBALAMIN TAB 1000 MCG 1000 MCG: 1000 TAB at 08:06

## 2021-04-15 RX ADMIN — FLUOXETINE 40 MG: 20 CAPSULE ORAL at 08:06

## 2021-04-15 RX ADMIN — SODIUM CHLORIDE 5 MG/HR: 900 INJECTION, SOLUTION INTRAVENOUS at 06:39

## 2021-04-15 RX ADMIN — POTASSIUM CHLORIDE 40 MEQ: 1500 TABLET, EXTENDED RELEASE ORAL at 09:50

## 2021-04-15 RX ADMIN — SODIUM CHLORIDE, POTASSIUM CHLORIDE, SODIUM LACTATE AND CALCIUM CHLORIDE 100 ML/HR: 600; 310; 30; 20 INJECTION, SOLUTION INTRAVENOUS at 06:04

## 2021-04-15 RX ADMIN — Medication 10 ML: at 21:02

## 2021-04-15 RX ADMIN — ASPIRIN 81 MG: 81 TABLET, COATED ORAL at 08:06

## 2021-04-15 NOTE — ANESTHESIA POSTPROCEDURE EVALUATION
Patient: Katarina Phillips    Procedure Summary     Date: 04/14/21 Room / Location: [facility]ATH [facility] 3 / [facility] CATH INVASIVE LOCATION    Anesthesia Start: 1437 Anesthesia Stop: 1657    Procedure: EP/Ablation (N/A ) Diagnosis:       Palpitations      Atrial flutter with rapid ventricular response (CMS/HCC)      (Successful radiofrequency ablation of counterclockwise right atrial flutter without complication)    Providers: Ori Tran MD Provider: Black Chung MD    Anesthesia Type: MAC ASA Status: 3          Anesthesia Type: MAC    Vitals  Vitals Value Taken Time   BP 99/58 04/14/21 1746   Temp     Pulse 66 04/14/21 1754   Resp     SpO2 100 % 04/14/21 1754   Vitals shown include unvalidated device data.        Post Anesthesia Care and Evaluation    Patient location during evaluation: PACU  Patient participation: complete - patient participated  Level of consciousness: awake  Pain scale: See nurse's notes for pain score.  Pain management: adequate  Airway patency: patent  Anesthetic complications: No anesthetic complications  PONV Status: none  Cardiovascular status: acceptable  Respiratory status: acceptable  Hydration status: acceptable    Comments: Patient seen and examined postoperatively; vital signs stable; SpO2 greater than or equal to 90%; cardiopulmonary status stable; nausea/vomiting adequately controlled; pain adequately controlled; no apparent anesthesia complications; patient discharged from anesthesia care when discharge criteria were met

## 2021-04-15 NOTE — PROGRESS NOTES
CC--recurrent atrial flutter post ablation, viral gastroenteritis      Subject  Continues to have mild abdominal bloating and diarrhea and denies any chest pain or dyspnea and complains of palpitations          Past Medical History:   Diagnosis Date   • Hypertension    • Hyperthyroidism      Past Surgical History:   Procedure Laterality Date   • BACK SURGERY     Review of Systems   General:  positive for fatigue and tiredness  Eyes: No redness  Cardiovascular: No chest pain, positive for  palpitations  Respiratory:   no shortness of breath  Genitourinary: no hematuria or dysuria  Musculoskeletal: No arthralgia or myalgia  Skin: No rash  Neurologic: No numbness, tingling, syncope  Hematologic/Lymphatic: No abnormal bleeding      Physical Exam  VITALS REVIEWED--- blood pressure 107/66 pulse rate is 100 patient is afebrile respiration 12 times a minute    General:      well developed, well nourished, in no acute distress.    Head:      normocephalic and atraumatic.    Eyes:      PERRL/EOM intact, conjunctiva and sclera clear with out nystagmus.    Neck:      no masses, thyromegaly,  trachea central with normal respiratory effort   Lungs:      clear bilaterally to auscultation.    Heart:       underlying rapid atrial flutter  and rapid ventricular rate without any murmurs gallops or rubs  Pulses:      pulses normal in all 4 extremities.  Right groin Soft without hematoma  Extremities:       no cyanosis or clubbing  Neurologic:      no focal deficits.   alert oriented x3    Psych:      alert and cooperative; normal mood and affect; normal attention span and concentration.          CBC    Results from last 7 days   Lab Units 04/15/21  0255 04/14/21  0349 04/13/21  1824   WBC 10*3/mm3 5.10 8.00 6.70   HEMOGLOBIN g/dL 11.2* 13.1 14.7   PLATELETS 10*3/mm3 187 233 267     BMP   Results from last 7 days   Lab Units 04/15/21  1648 04/15/21  0255 04/14/21  1322 04/14/21  0349 04/13/21  1824   SODIUM mmol/L  --  139  --  139 139    POTASSIUM mmol/L 4.4 3.6 3.7 3.2* 3.3*   CHLORIDE mmol/L  --  108*  --  103 101   CO2 mmol/L  --  23.0  --  22.0 27.0   BUN mg/dL  --  9  --  16 14   CREATININE mg/dL  --  0.62  --  0.61 0.78   GLUCOSE mg/dL  --  106*  --  102* 105*   MAGNESIUM mg/dL  --  1.7  --  1.8  --                Assessment and plan    Counterclockwise right atrial flutter status post complex ablation needing extensive ablation because of decreased numbers with recurrence  Change IV Cardizem to oral Cardizem  Patient started on dronedarone after discussing with me  Stop Lovenox  Continue aspirin  Viral gastroenteritis  Patient can be potentially discharged in the morning on medical therapy  Follow-up in 2 to 3 weeks in the office  Patient will likely need a redo ablation after 3 to 4 weeks  discussed extensively with the patient and instructions given to the nurse        Electronically signed by Ori Tran MD, 04/15/21, 7:40 PM EDT.

## 2021-04-15 NOTE — PLAN OF CARE
Patient had one episode of afib with RVR early on this evening, and MD was notified.  MD ordered a cardizem drip for the patient, but then she immediately reverted back into NSR, so the drip was held.  Groin cath site also looked good this evening, with no signs of bleeding.  Patient is no longer on bedrest at this time.     Problem: Adult Inpatient Plan of Care  Goal: Absence of Hospital-Acquired Illness or Injury  Intervention: Identify and Manage Fall Risk  Recent Flowsheet Documentation  Taken 4/15/2021 0314 by Ranjeet Flores RN  Safety Promotion/Fall Prevention:   safety round/check completed   room organization consistent   clutter free environment maintained   fall prevention program maintained  Taken 4/15/2021 0036 by Ranjeet Flores RN  Safety Promotion/Fall Prevention:   clutter free environment maintained   nonskid shoes/slippers when out of bed   room organization consistent   safety round/check completed   toileting scheduled  Taken 4/14/2021 2000 by Ranjeet Flores RN  Safety Promotion/Fall Prevention:   clutter free environment maintained   nonskid shoes/slippers when out of bed   safety round/check completed   room organization consistent   fall prevention program maintained  Intervention: Prevent Skin Injury  Recent Flowsheet Documentation  Taken 4/15/2021 0036 by Ranjeet Flores RN  Body Position: side-lying, right  Taken 4/14/2021 2000 by Ranjeet Flores RN  Body Position: supine  Skin Protection: incontinence pads utilized  Intervention: Prevent Infection  Recent Flowsheet Documentation  Taken 4/15/2021 0314 by Ranjeet Flores RN  Infection Prevention:   environmental surveillance performed   hand hygiene promoted   single patient room provided   visitors restricted/screened   rest/sleep promoted  Taken 4/15/2021 0036 by Ranjeet Flores RN  Infection Prevention:   environmental surveillance performed   hand hygiene promoted   rest/sleep promoted   single patient room  provided   visitors restricted/screened  Taken 4/14/2021 2000 by Ranjeet Flores RN  Infection Prevention:   environmental surveillance performed   hand hygiene promoted   visitors restricted/screened  Goal: Optimal Comfort and Wellbeing  Intervention: Provide Person-Centered Care  Recent Flowsheet Documentation  Taken 4/15/2021 0314 by Ranjeet Flores RN  Trust Relationship/Rapport:   care explained   choices provided   questions encouraged   reassurance provided  Taken 4/15/2021 0036 by Ranjeet Flores RN  Trust Relationship/Rapport:   care explained   choices provided   questions encouraged   questions answered     Problem: Fall Injury Risk  Goal: Absence of Fall and Fall-Related Injury  Intervention: Identify and Manage Contributors to Fall Injury Risk  Recent Flowsheet Documentation  Taken 4/15/2021 0314 by Ranjeet Flores RN  Medication Review/Management: medications reviewed  Taken 4/15/2021 0036 by Ranjeet Flores RN  Medication Review/Management: medications reviewed  Taken 4/14/2021 2000 by Ranjeet Flores RN  Medication Review/Management: medications reviewed  Intervention: Promote Injury-Free Environment  Recent Flowsheet Documentation  Taken 4/15/2021 0314 by Ranjeet Flores RN  Safety Promotion/Fall Prevention:   safety round/check completed   room organization consistent   clutter free environment maintained   fall prevention program maintained  Taken 4/15/2021 0036 by Ranjeet Flores RN  Safety Promotion/Fall Prevention:   clutter free environment maintained   nonskid shoes/slippers when out of bed   room organization consistent   safety round/check completed   toileting scheduled  Taken 4/14/2021 2000 by Ranjeet Flores RN  Safety Promotion/Fall Prevention:   clutter free environment maintained   nonskid shoes/slippers when out of bed   safety round/check completed   room organization consistent   fall prevention program maintained   Goal Outcome Evaluation:

## 2021-04-15 NOTE — PROGRESS NOTES
"      [facility] Daily Progress Note      Hospitalist Team  LOS 1 days      Patient Care Team:  Chloe Segura MD as PCP - General    Patient Location: 265/1      Subjective   Subjective     Chief Complaint / Subjective  Chief Complaint   Patient presents with   • Rapid Heart Rate     Patient found to be positive for norovirus.  Patient reports she regularly cares for a grandchild who is a toddler, likely source.  Diarrhea resolving abdominal pain improving.  Patient status post ablation for atrial flutter, patient sinus with intermittent episodes of breakthrough flutter.  Patient started on Multaq and metoprolol.  Patient notes palpitations periodically but shortness of breath improving.    Brief Synopsis of Hospital Course/HPI  66-year-old female presents to the ER with a chief complaint of identified accelerated heart rate, atrial fibrillation at the urgent care center today.  The patient had gone to the urgent care center secondary to acute epigastric pain with vomiting x2 days.  The patient states she had not felt well for several days with epigastric discomfort with radiation to the back.  Sunday she started vomiting and vomited multiple times for 2 days without any hematemesis.  The patient states her  insisted she seek medical attention today so she went to the urgent care center where she was noted to have atrial fibrillation with RVR.  Patient denies any chest pain, subjective fever or chills, shortness of breath.  The patient does report change in her stool which she describes as flat and ribbony with episodes of blood mixed with mucus.  The patient reports she had a colonoscopy a little over 10 years ago and was told that it was \"clean\" with recommendation for 10-year follow-up.  The patient states she did not notice she had irregular heartbeat or accelerated heartbeat until it was found at the urgent care center.     Review of records: The patient " "was seen by her primary care provider January 2021 and restarted on blood pressure medicine, amlodipine and medication for depression, Prozac.  The patient had stopped taking these medications for a couple of years but was ready to get restarted on them.  She had follow-up appointment February 2021 with increase of her amlodipine from 5 mg to 10 mg daily and increase of her Prozac from 20 mg daily to 40 mg daily.    4/14/2021: Patient reporting still having loose stool.  CT abdomen pelvis unremarkable GI panel pending.  No shortness of breath no chest pain at this time.  Patient still with heart rate in 140s to 150s showing atrial flutter, getting beta-blocker and calcium channel blockers.  Cardiology evaluating patient electrophysiology consulted.  Echo obtained and read pending.    Date::          Review of Systems   Constitutional: Positive for malaise/fatigue. Negative for chills and fever.   HENT: Negative for hoarse voice and stridor.    Eyes: Negative for double vision and photophobia.   Cardiovascular: Positive for dyspnea on exertion, irregular heartbeat and palpitations. Negative for chest pain.   Respiratory: Negative for cough and shortness of breath.    Musculoskeletal: Negative for falls and stiffness.   Gastrointestinal: Negative for abdominal pain, diarrhea, nausea and vomiting.   Genitourinary: Negative for dysuria and flank pain.   Neurological: Positive for weakness. Negative for dizziness.   Psychiatric/Behavioral: Negative for altered mental status. The patient is not nervous/anxious.          Objective   Objective      Vital Signs  Temp:  [97.7 °F (36.5 °C)-98.1 °F (36.7 °C)] 97.7 °F (36.5 °C)  Heart Rate:  [] 73  Resp:  [12-16] 14  BP: ()/(40-79) 102/44  Oxygen Therapy  SpO2: 95 %  Pulse Oximetry Type: Continuous  Device (Oxygen Therapy): nasal cannula  Flow (L/min): 2  Flowsheet Rows      First Filed Value   Admission Height  162.6 cm (64\") Documented at 04/13/2021 1806 "   Admission Weight  72 kg (158 lb 11.7 oz) Documented at 04/13/2021 1806        Intake & Output (last 3 days)       04/12 0701 - 04/13 0700 04/13 0701 - 04/14 0700 04/14 0701 - 04/15 0700 04/15 0701 - 04/16 0700    I.V. (mL/kg)   1000 (12.8)     Total Intake(mL/kg)   1000 (12.8)     Net   +1000             Urine Unmeasured Occurrence   2 x 3 x    Stool Unmeasured Occurrence    1 x        Lines, Drains & Airways    Active LDAs     Name:   Placement date:   Placement time:   Site:   Days:    Peripheral IV 04/13/21 1814 Right Antecubital   04/13/21 1814    Antecubital   less than 1    Peripheral IV 04/13/21 2322 Left Forearm   04/13/21 2322    Forearm   less than 1                  Physical Exam:    Physical Exam    General: Elderly female lying in bed breathing comfortably on 2 L via nasal cannula no acute distress  HEENT: NC/AT, EOMI, mucosa moist  Heart: Regular, rate controlled  Chest: Normal work of breathing moving air well no wheezing or crackles  Abdominal: Soft.  Mild distention, nontender  Musculoskeletal: Normal ROM.  No cyanosis. No calf tenderness.  Neurological: AAOx3, no focal deficits  Skin: Skin is warm and dry. No rash  Psychiatric: Normal mood and affect.        Procedures:              Results Review:     I reviewed the patient's new clinical results.      Lab Results (last 24 hours)     Procedure Component Value Units Date/Time    Magnesium [[id number]]  (Normal) Collected: 04/15/21 0255    Specimen: Blood Updated: 04/15/21 0937     Magnesium 1.7 mg/dL     Basic Metabolic Panel [[id number]]  (Abnormal) Collected: 04/15/21 0255    Specimen: Blood Updated: 04/15/21 0410     Glucose 106 mg/dL      BUN 9 mg/dL      Creatinine 0.62 mg/dL      Sodium 139 mmol/L      Potassium 3.6 mmol/L      Chloride 108 mmol/L      CO2 23.0 mmol/L      Calcium 8.0 mg/dL      eGFR Non African Amer 96 mL/min/1.73      BUN/Creatinine Ratio 14.5     Anion Gap 8.0 mmol/L     Narrative:      GFR Normal >60  Chronic Kidney  Disease <60  Kidney Failure <15      CBC & Differential [[id number]]  (Abnormal) Collected: 04/15/21 0255    Specimen: Blood Updated: 04/15/21 0354    Narrative:      The following orders were created for panel order CBC & Differential.  Procedure                               Abnormality         Status                     ---------                               -----------         ------                     CBC Auto Differential[[id number]]        Abnormal            Final result                 Please view results for these tests on the individual orders.    CBC Auto Differential [[id number]]  (Abnormal) Collected: 04/15/21 0255    Specimen: Blood Updated: 04/15/21 0354     WBC 5.10 10*3/mm3      RBC 3.89 10*6/mm3      Hemoglobin 11.2 g/dL      Hematocrit 33.6 %      MCV 86.3 fL      MCH 28.9 pg      MCHC 33.5 g/dL      RDW 14.4 %      RDW-SD 43.8 fl      MPV 10.6 fL      Platelets 187 10*3/mm3      Neutrophil % 64.2 %      Lymphocyte % 25.4 %      Monocyte % 8.7 %      Eosinophil % 1.2 %      Basophil % 0.5 %      Neutrophils, Absolute 3.20 10*3/mm3      Lymphocytes, Absolute 1.30 10*3/mm3      Monocytes, Absolute 0.40 10*3/mm3      Eosinophils, Absolute 0.10 10*3/mm3      Basophils, Absolute 0.00 10*3/mm3      nRBC 0.0 /100 WBC         No results found for: HGBA1C            Lab Results   Component Value Date    LIPASE 28 04/13/2021     Lab Results   Component Value Date    CHOL 202 (H) 01/22/2021    TRIG 119 01/22/2021    HDL 69 (H) 01/22/2021     (H) 01/22/2021       No results found for: INTRAOP, PREDX, FINALDX, COMDX    Microbiology Results (last 10 days)     Procedure Component Value - Date/Time    Gastrointestinal Panel, PCR - Stool, Per Rectum [[id number]]  (Abnormal) Collected: 04/14/21 0942    Lab Status: Final result Specimen: Stool from Per Rectum Updated: 04/14/21 1138     Campylobacter Not Detected     Plesiomonas shigelloides Not Detected     Salmonella Not Detected     Vibrio Not Detected      Vibrio cholerae Not Detected     Yersinia enterocolitica Not Detected     Enteroaggregative E. coli (EAEC) Not Detected     Enteropathogenic E. coli (EPEC) Not Detected     Enterotoxigenic E. coli (ETEC) lt/st Not Detected     Shiga-like toxin-producing E. coli (STEC) stx1/stx2 Not Detected     Shigella/Enteroinvasive E. coli (EIEC) Not Detected     Cryptosporidium Not Detected     Cyclospora cayetanensis Not Detected     Entamoeba histolytica Not Detected     Giardia lamblia Not Detected     Adenovirus F40/41 Not Detected     Astrovirus Not Detected     Norovirus GI/GII Detected     Rotavirus A Not Detected     Sapovirus (I, II, IV or V) Not Detected    COVID-19, [facility] IN-HOUSE,NASAL Swab (NO TRANSPORT MEDIA) 2 HR TAT - Swab, Nasopharynx [[id number]]  (Normal) Collected: 04/1955    Lab Status: Final result Specimen: Swab from Nasopharynx Updated: 04/13/21 2031     COVID19 Presumptive Negative    Narrative:      Fact sheet for providers: https://www.fda.gov/media/708057/download     Fact sheet for patients: https://www.fda.gov/media/005495/download    Test performed by PCR.  If inconsistent with clinical signs and symptoms patient should be tested with different authorized molecular test.          ECG/EMG Results (most recent)     Procedure Component Value Units Date/Time    ECG 12 Lead [[id number]] Collected: 04/13/21 1815     Updated: 04/13/21 1816     QT Interval 313 ms     Narrative:      HEART RATE= 162  bpm  RR Interval= 372  ms  WI Interval= 54  ms  P Horizontal Axis=   deg  P Front Axis= 0  deg  QRSD Interval= 116  ms  QT Interval= 313  ms  QRS Axis= 58  deg  T Wave Axis= -50  deg  - ABNORMAL ECG -  Supraventricular tachycardia  Incomplete right bundle branch block  Inferior infarct, age indeterminate  Electronically Signed By:   Date and Time of Study: 2021-04-13 18:15:17    ECG 12 Lead [[id number]] Collected: 04/13/21 2251     Updated: 04/13/21 2253     QT Interval 342 ms     Narrative:      HEART  RATE= 134  bpm  RR Interval= 440  ms  IN Interval=   ms  P Horizontal Axis=   deg  P Front Axis=   deg  QRSD Interval= 94  ms  QT Interval= 342  ms  QRS Axis= 81  deg  T Wave Axis= 62  deg  - ABNORMAL ECG -  Atrial flutter  Probable anterolateral infarct, acute  Prolonged QT interval  Electronically Signed By:   Date and Time of Study: 2021-04-13 22:51:17    ECG 12 Lead [[id number]] Collected: 04/13/21 1828     Updated: 04/14/21 0503     QT Interval 311 ms     Narrative:      HEART RATE= 149  bpm  RR Interval= 402  ms  IN Interval=   ms  P Horizontal Axis=   deg  P Front Axis=   deg  QRSD Interval= 88  ms  QT Interval= 311  ms  QRS Axis= 68  deg  T Wave Axis= 19  deg  - ABNORMAL ECG -  Atrial fibrillation  Electronically Signed By:   Date and Time of Study: 2021-04-13 18:28:06    EP/CRM Study [[id number]] Resulted: 04/14/21 1701     Updated: 04/14/21 1707    Narrative:      Procedure indication--- symptomatic and uncontrollable counterclockwise   rapid right atrial flutter and patient brought in for EP study and   ablation for symptomatic relief    Sedation Per anesthesia, complications none none estimated blood loss less   than 5 cc      Procedures done  #1 comprehensive EP study with pacing from multiple sites with induction   of arrhythmia  #2 successful and complex radiofrequency ablation which is a linear   radiofrequency ablation in the isthmus between tricuspid valve and    inferior vena cava for counterclockwise right atrial flutter  #3 intracardiac echocardiography  #4 three-dimensional mapping with Carto system  #5 post ablation EP study  #6 fluoroscopy      Procedure note  After obtaining valid consent patient was draped in sterile fashion and   the right groin was infiltrated local anesthesia and by using a   micropuncture kit and 9 Jordanian and a 7 Jordanian venous sheath were placed in   the right common femoral vein.  Decapolar catheter was then positioned in   the coronary sinus and entrainment was  consistent with counterclockwise   right atrial flutter.  Intracardiac echocardiography was used to delineate   the isthmus and the image was managed with three-dimensional mapping  For ablation purposes, a ThermoCool Smart touch catheter was taken   manufactured by [employer].  Ablation was done between tricuspid   valve and inferior vena cava and extensive ablation had to be done at 50 [address]   because of thick nature of isthmus for demonstration of bidirectional   block  Patient's atrial flutter terminated but further extensive ablation had to   be done in this area to document bidirectional block.  After that, repeat EP study done without further induction of arrhythmia  Catheters were removed  Patient transferred to recovery      Findings  Clinical  Arrhythmia is counterclockwise right atrial flutter with a cycle   length of 215 ms  Successful radiofrequency ablation with post ablation bidirectional block  No preexcitation  No retrograde conduction at 600 ms  Antegrade Wenckebach cycle length was 350 ms  AV giovani ERP was 500/270  No other arrhythmia induced during the study  Trivial pericardial effusion before and after procedure which is unchanged      Plan  Cardizem and anticoagulation  and metoprolol have been stopped  Start baby aspirin      Electronically signed by Ori Tran MD, 04/14/21, 5:06 PM EDT.    ECG 12 Lead [[id number]] Collected: 04/14/21 2231     Updated: 04/14/21 2232     QT Interval 416 ms     Narrative:      HEART RATE= 69  bpm  RR Interval= 872  ms  AR Interval= 134  ms  P Horizontal Axis= 13  deg  P Front Axis= 41  deg  QRSD Interval= 90  ms  QT Interval= 416  ms  QRS Axis= 12  deg  T Wave Axis= 67  deg  - ABNORMAL ECG -  Sinus rhythm  Probable anterolateral infarct, old  Electronically Signed By:   Date and Time of Study: 2021-04-14 22:31:28    ECG 12 Lead [[id number]] Collected: 04/15/21 0601     Updated: 04/15/21 0602     QT Interval 404 ms     Narrative:      HEART RATE= 74   bpm  RR Interval= 808  ms  AK Interval= 143  ms  P Horizontal Axis= -2  deg  P Front Axis= 56  deg  QRSD Interval= 95  ms  QT Interval= 404  ms  QRS Axis= 53  deg  T Wave Axis= 55  deg  - ABNORMAL ECG -  Sinus rhythm  Inferior infarct, old  When compared with ECG of 14-Apr-2021 22:31:28,  New or worsened ischemia or infarction  Electronically Signed By:   Date and Time of Study: 2021-04-15 06:01:05    Adult Transthoracic Echo Complete W/ Cont if Necessary Per Protocol [[id number]] Collected: 04/14/21 1026     Updated: 04/15/21 1052     BSA 1.8 m^2      RVIDd 3.0 cm      IVSd 1.5 cm      LVIDd 3.6 cm      LVIDs 2.5 cm      LVPWd 1.0 cm      IVS/LVPW 1.5     FS 30.3 %      EDV(Teich) 55.0 ml      ESV(Teich) 22.8 ml      EF(Teich) 58.6 %      EDV(cubed) 47.2 ml      ESV(cubed) 16.0 ml      EF(cubed) 66.1 %      LV mass(C)d 146.7 grams      LV mass(C)dI 83.4 grams/m^2      SV(Teich) 32.2 ml      SI(Teich) 18.3 ml/m^2      SV(cubed) 31.2 ml      SI(cubed) 17.7 ml/m^2      Ao root diam 2.7 cm      Ao root area 5.8 cm^2      ACS 2.2 cm      asc Aorta Diam 2.5 cm      LVOT diam 1.8 cm      LVOT area 2.6 cm^2      RVOT diam 2.4 cm      RVOT area 4.4 cm^2      EDV(MOD-sp4) 63.9 ml      ESV(MOD-sp4) 31.2 ml      EF(MOD-sp4) 51.1 %      EDV(MOD-sp2) 43.6 ml      ESV(MOD-sp2) 17.1 ml      EF(MOD-sp2) 60.8 %      SV(MOD-sp4) 32.7 ml      SI(MOD-sp4) 18.6 ml/m^2      SV(MOD-sp2) 26.5 ml      SI(MOD-sp2) 15.1 ml/m^2      Ao root area (BSA corrected) 1.5     LV Waller Vol (BSA corrected) 36.3 ml/m^2      LV Sys Vol (BSA corrected) 17.7 ml/m^2      Aortic R-R 0.43 sec      Aortic .3 BPM      MV E max fabian 129.4 cm/sec      MV A max fabian 0.47 cm/sec      MV E/A 274.0     MV V2 max 152.0 cm/sec      MV max PG 9.2 mmHg      MV V2 mean 84.0 cm/sec      MV mean PG 3.7 mmHg      MV V2 VTI 21.7 cm      MVA(VTI) 1.8 cm^2      MV dec slope 787.9 cm/sec^2      MV dec time 0.16 sec      Ao pk fabian 120.5 cm/sec      Ao max PG 5.8 mmHg      Ao  max PG (full) 2.5 mmHg      Ao V2 mean 74.9 cm/sec      Ao mean PG 2.7 mmHg      Ao mean PG (full) 0.82 mmHg      Ao V2 VTI 16.3 cm      YOLIS(I,A) 2.4 cm^2      YOLIS(I,D) 2.4 cm^2      YOLIS(V,A) 2.0 cm^2      YOLIS(V,D) 2.0 cm^2      LV V1 max PG 3.3 mmHg      LV V1 mean PG 1.9 mmHg      LV V1 max 91.5 cm/sec      LV V1 mean 63.7 cm/sec      LV V1 VTI 14.8 cm      MR max fabian 437.8 cm/sec      MR max PG 76.7 mmHg      CO(Ao) 13.0 l/min      CI(Ao) 7.4 l/min/m^2      SV(Ao) 93.9 ml      SI(Ao) 53.4 ml/m^2      CO(LVOT) 5.4 l/min      CI(LVOT) 3.0 l/min/m^2      SV(LVOT) 38.7 ml      SV(RVOT) 46.7 ml      SI(LVOT) 22.0 ml/m^2      PA V2 max 73.4 cm/sec      PA max PG 2.2 mmHg      PA max PG (full) 0.94 mmHg      PA V2 mean 55.5 cm/sec      PA mean PG 1.3 mmHg      PA mean PG (full) 0.62 mmHg      PA V2 VTI 15.6 cm      PVA(I,A) 3.0 cm^2       CV ECHO KADEEM - PVA(I,D) 3.0 cm^2       CV ECHO KADEEM - PVA(V,A) 3.3 cm^2       CV ECHO KADEEM - PVA(V,D) 3.3 cm^2      PA acc time 0.08 sec      RV V1 max PG 1.2 mmHg      RV V1 mean PG 0.7 mmHg      RV V1 max 55.1 cm/sec      RV V1 mean 39.6 cm/sec      RV V1 VTI 10.5 cm      TR max fabian 254.2 cm/sec      RVSP(TR) 29.0 mmHg      RAP systole 3.0 mmHg      PA pr(Accel) 43.0 mmHg      Qp/Qs 1.2      CV ECHO KADEEM - BZI_BMI 26.8 kilograms/m^2       CV ECHO KADEEM - BSA([facility]) 1.8 m^2      BH CV ECHO KADEEM - BZI_METRIC_WEIGHT 70.8 kg      BH CV ECHO KADEEM - BZI_METRIC_HEIGHT 162.6 cm      EF(MOD-bp) 57.0 %      LA dimension(2D) 3.5 cm     Narrative:      · Left ventricular systolic function is normal.  · Left ventricular ejection fraction is 55%  · Left ventricular wall thickness is consistent with moderate concentric   hypertrophy.  · Left ventricular diastolic function was normal.  · The right atrial cavity is borderline dilated.  · Calculated right ventricular systolic pressure from tricuspid   regurgitation is 29 mmHg.                 Results for orders placed during the hospital  encounter of 04/13/21    Adult Transthoracic Echo Complete W/ Cont if Necessary Per Protocol    Interpretation Summary  · Left ventricular systolic function is normal.  · Left ventricular ejection fraction is 55%  · Left ventricular wall thickness is consistent with moderate concentric hypertrophy.  · Left ventricular diastolic function was normal.  · The right atrial cavity is borderline dilated.  · Calculated right ventricular systolic pressure from tricuspid regurgitation is 29 mmHg.      CT Abdomen Pelvis Without Contrast    Result Date: 4/14/2021  1.No acute process identified within abdomen/pelivs. 2.Nonobstructing left renal stone.    Electronically Signed By-Carlos Molina MD On:4/14/2021 10:38 AM This report was finalized on [id number] by  Carlos Molina MD.    XR Chest 1 View    Result Date: 4/13/2021  There is no significant change when compared to the prior study. There is no evidence for acute cardiopulmonary process.  Electronically Signed By-Carlos Tilley MD On:4/13/2021 7:42 PM This report was finalized on [id number] by  Carlos Tilley MD.          Xrays, labs reviewed personally by physician.    Medication Review:   I have reviewed the patient's current medication list      Scheduled Meds  aspirin, 81 mg, Oral, Daily  cholecalciferol, 1,000 Units, Oral, Daily  dronedarone, 400 mg, Oral, Q12H  FLUoxetine, 40 mg, Oral, Daily  metoprolol succinate XL, 25 mg, Oral, Q24H  pantoprazole, 40 mg, Intravenous, Q AM  sodium chloride, 10 mL, Intravenous, Q12H  vitamin B-12, 1,000 mcg, Oral, Daily        Meds Infusions  dilTIAZem, 5-15 mg/hr, Last Rate: 5 mg/hr (04/15/21 1016)        Meds PRN  •  acetaminophen **OR** acetaminophen **OR** acetaminophen  •  HYDROcodone-acetaminophen  •  ondansetron  •  ondansetron **OR** [DISCONTINUED] ondansetron  •  potassium chloride  •  potassium chloride  •  potassium chloride  •  [COMPLETED] Insert peripheral IV **AND** sodium chloride  •  sodium  chloride        Assessment/Plan   Assessment/Plan     Active Hospital Problems    Diagnosis  POA   • Palpitations [R00.2]  Unknown   • Atrial flutter with rapid ventricular response (CMS/HCC) [I48.92]  Unknown   • Gastroesophageal reflux disease [K21.9]  Yes   • Chronic pain disorder [G89.4]  Yes   • Hypertension [I10]  Yes   • Hyperlipidemia [E78.5]  Yes      Resolved Hospital Problems    Diagnosis Date Resolved POA   • **New onset a-fib (CMS/HCC) [I48.91] 04/14/2021 Yes       MEDICAL DECISION MAKING COMPLEXITY BY PROBLEM:     Atrial flutter-patient presented to the emergency department after being seen in urgent care center for GI concerns.  Patient advised to seek care in the emergency department after finding of patient's tachyarrhythmia, patient found to be in atrial flutter, with refractory episodes after ablation  -Telemetry  -Cardiology consulted  -Monitor electrolytes  -Echo obtained  -Cardizem, beta-blocker given  -Electrophysiology consulted, patient status post ablation  -Troponins negative  -Monitor for possible causes of tachyarrhythmia including GI infection  -Patient's with mildly elevated BNP which may be secondary to tachyarrhythmia  -Patient on Lovenox therapeutic  -Patient started on Multaq and metoprolol    Norovirus infection-patient noted loose stool and abdominal pain intermittently this hospital stay patient reports GI issues on and off for multiple weeks.    -Antiemetics  -Patient showing signs of volume depletion, starting IV fluids with bolus given, now weaning as patient showing signs of third spacing  -Monitor electrolytes  -CT abdomen pelvis showing no acute pathology  -PPI  -Lipase normal  -GI panel positive for norovirus    Hypokalemia-most likely secondary to GI loss  -Replace  -Magnesium borderline low giving another 2 g magnesium sulfate    Anemia-patient's hemoglobin 13 on admission likely due to hemodilution no obvious signs of bleeding  -Monitor  daily    Hypertension/anxiety/depression/vitamin deficiencies-chronic in nature  -Resume home medication as clinically appropriate      VTE Prophylaxis -   Mechanical Order History:     None      Pharmalogical Order History:      Ordered     Dose Route Frequency Stop    04/14/21 0601  enoxaparin (LOVENOX) syringe 70 mg  Status:  Discontinued      1 mg/kg SC Every 12 Hours 04/14/21 0913    04/13/21 2237  enoxaparin (LOVENOX) syringe 70 mg      70 mg SC Once 04/14/21 0059 04/13/21 2235  Pharmacy to Dose enoxaparin (LOVENOX)  Status:  Discontinued     Question:  Indication of use  Answer:  Atrial Fibrillation - requiring full anticoagulation    -- XX Continuous PRN 04/14/21 0929                  Code Status -   Code Status and Medical Interventions:   Ordered at: 04/13/21 2235     Code Status:    CPR     Medical Interventions (Level of Support Prior to Arrest):    Full       This patient has been examined wearing appropriate Personal Protective Equipment and discussed with hospital infection control department. 04/15/21        Discharge Planning  pending        Electronically signed by Jacques Diamond MD, 04/15/21, 11:35 EDT.  [facility] Hospitalist Team

## 2021-04-15 NOTE — PROGRESS NOTES
Discharge Planning Assessment  [facility]     Patient Name: Katarina Phillips  MRN: [id number]  Today's Date: 4/15/2021    Admit Date: 4/13/2021    Discharge Needs Assessment     Row Name 04/15/21 1600       Living Environment    Lives With  spouse;grandchild(sisi)    Current Living Arrangements  home/apartment/condo    Primary Care Provided by  self    Able to Return to Prior Arrangements  yes       Resource/Environmental Concerns    Resource/Environmental Concerns  none    Transportation Concerns  car, none       Transition Planning    Patient/Family Anticipates Transition to  home with family    Patient/Family Anticipated Services at Transition  none    Transportation Anticipated  car, drives self;family or friend will provide Spouse to transport to home at [place]       Discharge Needs Assessment    Equipment Currently Used at Home  none    Concerns to be Addressed  no discharge needs identified    Anticipated Changes Related to Illness  none    Equipment Needed After Discharge  none        Discharge Plan     Row Name 04/15/21 1605       Plan    Plan  Routine d/c to home      Plan Comments  Spoke to patient  in room with mask and goggles maintaining 6 ft for less than 15 min. Patient is I with ADL's and drives. PCP is [place] and pharmacy is [facility]. Dc barriers - Cardizem GTT, PO Medication changes, Continous Cardiac Monitoring        Continued Care and Services - Admitted Since 4/13/2021          Demographic Summary     Row Name 04/15/21 8031       General Information    Admission Type  inpatient    Arrived From  emergency department    Required Notices Provided  Important Message from Medicare    Referral Source  admission list    Reason for Consult  discharge planning    Preferred Language  English        Functional Status     Row Name 04/15/21 1600       Functional Status, IADL    Medications  independent    Meal Preparation  independent    Housekeeping  independent    Laundry  independent     Shopping  independent       Mental Status    General Appearance WDL  WDL       Mental Status Summary    Recent Changes in Mental Status/Cognitive Functioning  no changes               Patient Forms     Row Name 04/15/21 1558       Patient Forms    Important Message from Medicare ([facility])  Delivered IM 4/15/21 per Registration          Marisol Garcia RN, CM  Office Phone [phone]  Cell [phone]

## 2021-04-15 NOTE — PLAN OF CARE
Goal Outcome Evaluation:  Pt continues to go in and out of Afib/flutter while on cardizem gtt at 5mcg. Titrated up to to 10 once during shift but titrated down due to hypotension. Pt c/o increased edema to extremities, thus fluids discontinued. RUSS complained of once during shift, tx effectively with hydrocodone. O2 sats maintained at low 90s with 2L O2.

## 2021-04-15 NOTE — PROGRESS NOTES
CARDIOLOGY FOLLOW-UP PROGRESS NOTE      Reason for follow-up:    Atrial Flutter RVR  Norovirus infection  Diarrhea       Attending: Jacques Diamond,*      Subjective .     Patient denies any chest pain but she has diarrhea.  Few episode of palpitation     Review of Systems   Constitutional: Negative for chills and fever.   HENT: Negative for ear discharge and nosebleeds.    Eyes: Negative for discharge and redness.   Cardiovascular: Positive for irregular heartbeat and palpitations. Negative for chest pain, orthopnea, paroxysmal nocturnal dyspnea and syncope.   Respiratory: Negative for cough, shortness of breath and wheezing.    Endocrine: Negative for heat intolerance.   Skin: Negative for rash.   Musculoskeletal: Negative for arthritis and myalgias.   Gastrointestinal: Positive for abdominal pain and diarrhea. Negative for melena, nausea and vomiting.   Genitourinary: Negative for dysuria and hematuria.   Neurological: Negative for dizziness, light-headedness, numbness and tremors.   Psychiatric/Behavioral: Negative for depression. The patient is not nervous/anxious.        Allergies: Doxycycline and Sulfa antibiotics    Scheduled Meds:aspirin, 81 mg, Oral, Daily  cholecalciferol, 1,000 Units, Oral, Daily  FLUoxetine, 40 mg, Oral, Daily  pantoprazole, 40 mg, Intravenous, Q AM  sodium chloride, 10 mL, Intravenous, Q12H  vitamin B-12, 1,000 mcg, Oral, Daily        Continuous Infusions:dilTIAZem, 5-15 mg/hr, Last Rate: 10 mg/hr (04/15/21 0756)        PRN Meds:.•  acetaminophen **OR** acetaminophen **OR** acetaminophen  •  HYDROcodone-acetaminophen  •  ondansetron  •  ondansetron **OR** [DISCONTINUED] ondansetron  •  potassium chloride  •  potassium chloride  •  potassium chloride  •  [COMPLETED] Insert peripheral IV **AND** sodium chloride  •  sodium chloride    Objective     VITAL SIGNS  Patient Vitals for the past 24 hrs:   BP Temp Temp src Pulse Resp SpO2 Height Weight   04/15/21 0700 131/60 -- -- 78 --  "92 % -- --   04/15/21 0645 122/79 -- -- 80 -- 94 % -- --   04/15/21 0639 129/72 -- -- (!) 143 -- 93 % -- --   04/15/21 0600 124/58 -- -- 77 12 (!) 85 % -- 77.9 kg (171 lb 11.8 oz)   04/15/21 0300 110/44 -- -- 70 -- -- -- --   04/15/21 0200 108/49 98.1 °F (36.7 °C) Oral 69 13 -- -- --   04/15/21 0100 110/47 -- -- 71 -- -- -- --   04/14/21 2300 104/49 -- -- 67 -- -- -- --   04/14/21 2238 -- -- -- 70 -- -- -- --   04/14/21 2200 114/70 -- -- 120 16 -- -- --   04/14/21 2130 112/56 -- -- 66 -- -- -- --   04/14/21 1930 (!) 87/51 -- -- 62 -- 96 % -- --   04/14/21 1900 90/44 -- -- 61 -- (!) 87 % -- --   04/14/21 1850 -- -- -- 61 -- (!) 86 % -- --   04/14/21 1840 -- -- -- 119 -- (!) 87 % -- --   04/14/21 1830 102/40 -- -- (!) 49 -- (!) 89 % -- --   04/14/21 1820 100/49 -- -- 63 16 90 % -- --   04/14/21 1800 -- -- -- 63 -- 98 % -- --   04/14/21 1745 99/58 -- -- 61 -- 100 % -- --   04/14/21 1740 -- -- -- 63 -- 99 % -- --   04/14/21 1735 -- -- -- 59 -- 100 % -- --   04/14/21 1730 95/53 -- -- 59 -- 100 % -- --   04/14/21 1725 92/50 -- -- 58 -- 100 % -- --   04/14/21 1720 102/50 -- -- 61 -- 100 % -- --   04/14/21 1715 93/49 -- -- 59 -- (!) 82 % -- --   04/14/21 1710 -- -- -- 60 -- 100 % -- --   04/14/21 1705 -- -- -- 60 -- 100 % -- --   04/14/21 1025 96/65 -- -- (!) 144 -- -- 162.6 cm (64\") 70.8 kg (156 lb)   04/14/21 1016 96/65 98.4 °F (36.9 °C) Oral (!) 143 16 93 % -- --   04/14/21 0927 140/64 -- -- (!) 151 -- -- -- --        Flowsheet Rows      First Filed Value   Admission Height  162.6 cm (64\") Documented at 04/13/2021 1806   Admission Weight  72 kg (158 lb 11.7 oz) Documented at 04/13/2021 1806          Body mass index is 29.48 kg/m².      Intake/Output Summary (Last 24 hours) at 4/15/2021 0916  Last data filed at 4/14/2021 1653  Gross per 24 hour   Intake 1000 ml   Output --   Net 1000 ml        TELEMETRY:     SR  Paroxysms of atrial flutter with RVR    Physical Exam:  Constitutional:       Appearance: Well-developed. "   Eyes:      General: No scleral icterus.        Right eye: No discharge.   HENT:      Head: Normocephalic and atraumatic.   Neck:      Thyroid: No thyromegaly.      Lymphadenopathy: No cervical adenopathy.   Pulmonary:      Effort: Pulmonary effort is normal. No respiratory distress.      Breath sounds: Normal breath sounds. No wheezing. No rales.   Cardiovascular:      Normal rate. Regular rhythm.      No gallop.   Abdominal:      Tenderness: There is no abdominal tenderness.   Skin:     Findings: No erythema or rash.   Neurological:      Mental Status: Alert and oriented to person, place, and time.            Results Review:   I reviewed the patient's new clinical results.    CBC    Results from last 7 days   Lab Units 04/15/21  0255 04/14/21  0349 04/13/21  1824   WBC 10*3/mm3 5.10 8.00 6.70   HEMOGLOBIN g/dL 11.2* 13.1 14.7   PLATELETS 10*3/mm3 187 233 267     BMP   Results from last 7 days   Lab Units 04/15/21  0255 04/14/21  1322 04/14/21  0349 04/13/21  1824   SODIUM mmol/L 139  --  139 139   POTASSIUM mmol/L 3.6 3.7 3.2* 3.3*   CHLORIDE mmol/L 108*  --  103 101   CO2 mmol/L 23.0  --  22.0 27.0   BUN mg/dL 9  --  16 14   CREATININE mg/dL 0.62  --  0.61 0.78   GLUCOSE mg/dL 106*  --  102* 105*   MAGNESIUM mg/dL  --   --  1.8  --      Cr Clearance Estimated Creatinine Clearance: 69.9 mL/min (by C-G formula based on SCr of 0.62 mg/dL).  Coag     HbA1C No results found for: HGBA1C  Blood Glucose No results found for: POCGLU  Infection     CMP   Results from last 7 days   Lab Units 04/15/21  0255 04/14/21  1322 04/14/21  0349 04/13/21  1824   SODIUM mmol/L 139  --  139 139   POTASSIUM mmol/L 3.6 3.7 3.2* 3.3*   CHLORIDE mmol/L 108*  --  103 101   CO2 mmol/L 23.0  --  22.0 27.0   BUN mg/dL 9  --  16 14   CREATININE mg/dL 0.62  --  0.61 0.78   GLUCOSE mg/dL 106*  --  102* 105*   ALBUMIN g/dL  --   --   --  4.10   BILIRUBIN mg/dL  --   --   --  0.2   ALK PHOS U/L  --   --   --  87   AST (SGOT) U/L  --   --   --  20    ALT (SGPT) U/L  --   --   --  20   LIPASE U/L  --   --   --  28     ABG      UA      LINETTE  No results found for: POCMETH, POCAMPHET, POCBARBITUR, POCBENZO, POCCOCAINE, POCOPIATES, POCOXYCODO, POCPHENCYC, POCPROPOXY, POCTHC, POCTRICYC  Lysis Labs   Results from last 7 days   Lab Units 04/15/21  0255 04/14/21  0349 04/13/21  1824   HEMOGLOBIN g/dL 11.2* 13.1 14.7   PLATELETS 10*3/mm3 187 233 267   CREATININE mg/dL 0.62 0.61 0.78     Radiology(recent) CT Abdomen Pelvis Without Contrast    Result Date: 4/14/2021  1.No acute process identified within abdomen/pelivs. 2.Nonobstructing left renal stone.    Electronically Signed By-Carlos Molina MD On:4/14/2021 10:38 AM This report was finalized on [id number] by  Carlos Molina MD.    XR Chest 1 View    Result Date: 4/13/2021  There is no significant change when compared to the prior study. There is no evidence for acute cardiopulmonary process.  Electronically Signed By-Carlos Tilley MD On:4/13/2021 7:42 PM This report was finalized on [id number] by  Carlos Tilley MD.      Imaging Results (Last 24 Hours)     ** No results found for the last 24 hours. **          Results from last 7 days   Lab Units 04/13/21  2321   TROPONIN T ng/mL <0.010       EKG              I personally viewed and interpreted the patient's EKG/Telemetry data:        ECHOCARDIOGRAM:       Dr. Berg to review    STRESS MYOVIEW:      CARDIAC CATHETERIZATION:      OTHER:         Assessment/Plan            Chronic pain disorder    Gastroesophageal reflux disease    Hyperlipidemia    Hypertension    Palpitations    Atrial flutter with rapid ventricular response (CMS/HCC)        ASSESSMENT:    Atrial Flutter with RVR    -s/p EPS/right atrial flutter ablation    -recurrent atrial flutter this am; now on IV Cardizem  Recent Diarrhea with melena and Fecal Incontinence  Abdominal Pain/Bloating  N/V  HTN by history    PLAN:    Replace K+  Recheck Mg  D/W Dr. Tran he advised to start Multaq 400  mg po BID  Stop IVF for now  Remains on IV Cardizem      Additional recommendations per Dr. Berg    Patient is seen and examined and findings are verified.  Patient is having diarrhea and feeling fatigue and tired.  Patient denies any chest pain.  Patient complain of occasional palpitation    Patient is sinus rhythm but she is having spurts of atrial flutter.    Normal S1 and S2.  No pericardial rub.  Abdominal exam was benign    MDM:    1.  Atrial flutter:    Patient is having episodes of atrial flutter after ablation.  After discussion with EP service I would recommend to start Multaq 400 mg twice daily.  Low-dose Toprol-XL would be started    2.  Electrolytes abnormality:    Her potassium is low.  I would recommend to replace it.    3.  Norovirus infection:    I would have Dr. Diamond manage this.    I discussed the patients findings and my recommendations with patient and RN                          MONIK Dueñas  04/15/21  09:16 EDT

## 2021-04-16 ENCOUNTER — READMISSION MANAGEMENT (OUTPATIENT)
Dept: CALL CENTER | Facility: HOSPITAL | Age: 66
End: 2021-04-16

## 2021-04-16 VITALS
HEIGHT: 64 IN | HEART RATE: 108 BPM | WEIGHT: 171.74 LBS | RESPIRATION RATE: 19 BRPM | DIASTOLIC BLOOD PRESSURE: 59 MMHG | BODY MASS INDEX: 29.32 KG/M2 | OXYGEN SATURATION: 91 % | SYSTOLIC BLOOD PRESSURE: 97 MMHG | TEMPERATURE: 98.3 F

## 2021-04-16 LAB
ANION GAP SERPL CALCULATED.3IONS-SCNC: 10 MMOL/L (ref 5–15)
BASOPHILS # BLD AUTO: 0 10*3/MM3 (ref 0–0.2)
BASOPHILS NFR BLD AUTO: 0.3 % (ref 0–1.5)
BUN SERPL-MCNC: 9 MG/DL (ref 8–23)
BUN/CREAT SERPL: 15 (ref 7–25)
CALCIUM SPEC-SCNC: 8 MG/DL (ref 8.6–10.5)
CHLORIDE SERPL-SCNC: 107 MMOL/L (ref 98–107)
CO2 SERPL-SCNC: 23 MMOL/L (ref 22–29)
CREAT SERPL-MCNC: 0.6 MG/DL (ref 0.57–1)
DEPRECATED RDW RBC AUTO: 42.4 FL (ref 37–54)
EOSINOPHIL # BLD AUTO: 0.1 10*3/MM3 (ref 0–0.4)
EOSINOPHIL NFR BLD AUTO: 1 % (ref 0.3–6.2)
ERYTHROCYTE [DISTWIDTH] IN BLOOD BY AUTOMATED COUNT: 13.9 % (ref 12.3–15.4)
GFR SERPL CREATININE-BSD FRML MDRD: 100 ML/MIN/1.73
GLUCOSE SERPL-MCNC: 118 MG/DL (ref 65–99)
HCT VFR BLD AUTO: 37.1 % (ref 34–46.6)
HGB BLD-MCNC: 12.3 G/DL (ref 12–15.9)
LYMPHOCYTES # BLD AUTO: 1.4 10*3/MM3 (ref 0.7–3.1)
LYMPHOCYTES NFR BLD AUTO: 18.9 % (ref 19.6–45.3)
MAGNESIUM SERPL-MCNC: 1.7 MG/DL (ref 1.6–2.4)
MCH RBC QN AUTO: 28.5 PG (ref 26.6–33)
MCHC RBC AUTO-ENTMCNC: 33.2 G/DL (ref 31.5–35.7)
MCV RBC AUTO: 85.8 FL (ref 79–97)
MONOCYTES # BLD AUTO: 0.6 10*3/MM3 (ref 0.1–0.9)
MONOCYTES NFR BLD AUTO: 8.6 % (ref 5–12)
NEUTROPHILS NFR BLD AUTO: 5.4 10*3/MM3 (ref 1.7–7)
NEUTROPHILS NFR BLD AUTO: 71.2 % (ref 42.7–76)
NRBC BLD AUTO-RTO: 0.1 /100 WBC (ref 0–0.2)
NT-PROBNP SERPL-MCNC: 443.5 PG/ML (ref 0–900)
PLATELET # BLD AUTO: 194 10*3/MM3 (ref 140–450)
PMV BLD AUTO: 9.9 FL (ref 6–12)
POTASSIUM SERPL-SCNC: 4 MMOL/L (ref 3.5–5.2)
QT INTERVAL: 313 MS
QT INTERVAL: 405 MS
RBC # BLD AUTO: 4.33 10*6/MM3 (ref 3.77–5.28)
SODIUM SERPL-SCNC: 140 MMOL/L (ref 136–145)
WBC # BLD AUTO: 7.5 10*3/MM3 (ref 3.4–10.8)

## 2021-04-16 PROCEDURE — 80048 BASIC METABOLIC PNL TOTAL CA: CPT | Performed by: FAMILY MEDICINE

## 2021-04-16 PROCEDURE — 83735 ASSAY OF MAGNESIUM: CPT | Performed by: FAMILY MEDICINE

## 2021-04-16 PROCEDURE — 85025 COMPLETE CBC W/AUTO DIFF WBC: CPT | Performed by: FAMILY MEDICINE

## 2021-04-16 PROCEDURE — 94618 PULMONARY STRESS TESTING: CPT

## 2021-04-16 PROCEDURE — 83880 ASSAY OF NATRIURETIC PEPTIDE: CPT | Performed by: FAMILY MEDICINE

## 2021-04-16 PROCEDURE — 99239 HOSP IP/OBS DSCHRG MGMT >30: CPT | Performed by: FAMILY MEDICINE

## 2021-04-16 PROCEDURE — 99232 SBSQ HOSP IP/OBS MODERATE 35: CPT | Performed by: NURSE PRACTITIONER

## 2021-04-16 PROCEDURE — 99232 SBSQ HOSP IP/OBS MODERATE 35: CPT | Performed by: INTERNAL MEDICINE

## 2021-04-16 RX ORDER — ASPIRIN 81 MG/1
81 TABLET ORAL DAILY
Qty: 30 TABLET | Refills: 0 | Status: ON HOLD | OUTPATIENT
Start: 2021-04-17 | End: 2021-05-11

## 2021-04-16 RX ORDER — DILTIAZEM HYDROCHLORIDE EXTENDED-RELEASE TABLETS 240 MG/1
240 TABLET, EXTENDED RELEASE ORAL DAILY
Qty: 30 TABLET | Refills: 0 | Status: ON HOLD | OUTPATIENT
Start: 2021-04-16 | End: 2021-05-11

## 2021-04-16 RX ORDER — METOPROLOL SUCCINATE 25 MG/1
25 TABLET, EXTENDED RELEASE ORAL
Qty: 30 TABLET | Refills: 0 | Status: SHIPPED | OUTPATIENT
Start: 2021-04-17 | End: 2021-06-02

## 2021-04-16 RX ADMIN — FLUOXETINE 40 MG: 20 CAPSULE ORAL at 08:14

## 2021-04-16 RX ADMIN — Medication 1000 UNITS: at 08:14

## 2021-04-16 RX ADMIN — DRONEDARONE 400 MG: 400 TABLET, FILM COATED ORAL at 08:13

## 2021-04-16 RX ADMIN — PANTOPRAZOLE SODIUM 40 MG: 40 INJECTION, POWDER, FOR SOLUTION INTRAVENOUS at 06:44

## 2021-04-16 RX ADMIN — HYDROCODONE BITARTRATE AND ACETAMINOPHEN 1 TABLET: 5; 325 TABLET ORAL at 14:54

## 2021-04-16 RX ADMIN — ASPIRIN 81 MG: 81 TABLET, COATED ORAL at 08:14

## 2021-04-16 RX ADMIN — METOPROLOL SUCCINATE 25 MG: 25 TABLET, EXTENDED RELEASE ORAL at 08:14

## 2021-04-16 RX ADMIN — DILTIAZEM HYDROCHLORIDE 60 MG: 60 TABLET, FILM COATED ORAL at 06:44

## 2021-04-16 RX ADMIN — HYDROCODONE BITARTRATE AND ACETAMINOPHEN 1 TABLET: 5; 325 TABLET ORAL at 08:14

## 2021-04-16 RX ADMIN — Medication 10 ML: at 08:14

## 2021-04-16 RX ADMIN — CYANOCOBALAMIN TAB 1000 MCG 1000 MCG: 1000 TAB at 08:14

## 2021-04-16 RX ADMIN — HYDROCODONE BITARTRATE AND ACETAMINOPHEN 1 TABLET: 5; 325 TABLET ORAL at 01:28

## 2021-04-16 NOTE — PROGRESS NOTES
Exercise Oximetry    Patient Name:Katarina Phillips   MRN: [id number]   Date: 04/16/21             ROOM AIR BASELINE   SpO2% 93%   Heart Rate 110   Blood Pressure      EXERCISE ON ROOM AIR SpO2% EXERCISE ON O2 @  LPM SpO2%   1 MINUTE 93% 1 MINUTE    2 MINUTES 92% 2 MINUTES    3 MINUTES 90% 3 MINUTES    4 MINUTES 91% 4 MINUTES    5 MINUTES 94% 5 MINUTES    6 MINUTES 92% 6 MINUTES               Distance Walked Around NITESH x2 Distance Walked   Dyspnea (Keila Scale)   Dyspnea (Keila Scale)   Fatigue (Keila Scale)   Fatigue (Keila Scale)   SpO2% Post Exercise  93% SpO2% Post Exercise   HR Post Exercise  98 HR Post Exercise   Time to Recovery  1 Minute Time to Recovery     Comments: Patient does not need Home Oxygen. Cristine Watts, CRT\

## 2021-04-16 NOTE — PROGRESS NOTES
CARDIOLOGY FOLLOW-UP PROGRESS NOTE      Reason for follow-up:    Atrial Flutter RVR  Norovirus infection  Diarrhea       Attending: Jacques Diamond,*      Subjective .     Patient denies any chest pain c/o palpitations  C/o bloating     Review of Systems   Constitutional: Negative for chills and fever.   HENT: Negative for ear discharge and nosebleeds.    Eyes: Negative for discharge and redness.   Cardiovascular: Positive for irregular heartbeat and palpitations. Negative for chest pain, orthopnea, paroxysmal nocturnal dyspnea and syncope.   Respiratory: Negative for cough, shortness of breath and wheezing.    Endocrine: Negative for heat intolerance.   Skin: Negative for rash.   Musculoskeletal: Negative for arthritis and myalgias.   Gastrointestinal: Positive for abdominal pain and diarrhea. Negative for melena, nausea and vomiting.   Genitourinary: Negative for dysuria and hematuria.   Neurological: Negative for dizziness, light-headedness, numbness and tremors.   Psychiatric/Behavioral: Negative for depression. The patient is not nervous/anxious.        Allergies: Doxycycline and Sulfa antibiotics    Scheduled Meds:aspirin, 81 mg, Oral, Daily  cholecalciferol, 1,000 Units, Oral, Daily  dilTIAZem, 60 mg, Oral, Q8H  dronedarone, 400 mg, Oral, Q12H  FLUoxetine, 40 mg, Oral, Daily  metoprolol succinate XL, 25 mg, Oral, Q24H  pantoprazole, 40 mg, Intravenous, Q AM  sodium chloride, 10 mL, Intravenous, Q12H  vitamin B-12, 1,000 mcg, Oral, Daily        Continuous Infusions:     PRN Meds:.•  acetaminophen **OR** acetaminophen **OR** acetaminophen  •  HYDROcodone-acetaminophen  •  ondansetron  •  ondansetron **OR** [DISCONTINUED] ondansetron  •  potassium chloride  •  potassium chloride  •  potassium chloride  •  [COMPLETED] Insert peripheral IV **AND** sodium chloride  •  sodium chloride    Objective     VITAL SIGNS  Patient Vitals for the past 24 hrs:   BP Temp Temp src Pulse Resp SpO2   04/16/21 1408 97/59  "98.3 °F (36.8 °C) Oral 108 19 91 %   04/16/21 1349 -- -- -- 60 -- --   04/16/21 1028 107/70 98.4 °F (36.9 °C) Oral 111 15 91 %   04/16/21 0813 114/59 -- -- 71 -- --   04/16/21 0655 104/44 97.6 °F (36.4 °C) Oral 57 15 (!) 89 %   04/16/21 0304 115/59 98.5 °F (36.9 °C) Oral 68 15 93 %   04/15/21 2301 127/56 -- -- 79 -- 92 %   04/15/21 2239 93/72 98.5 °F (36.9 °C) Oral (!) 137 21 (!) 89 %   04/15/21 2230 140/72 -- -- (!) 121 -- (!) 87 %   04/15/21 2200 122/69 -- -- (!) 140 -- 93 %   04/15/21 2130 121/65 -- -- (!) 138 -- 92 %   04/15/21 2100 133/61 -- -- 83 -- 92 %   04/15/21 2045 -- -- -- (!) 139 -- 91 %   04/15/21 2030 139/71 -- -- (!) 129 -- (!) 88 %   04/15/21 2015 -- -- -- (!) 122 -- (!) 88 %   04/15/21 2000 134/58 -- -- 81 -- (!) 88 %   04/15/21 1945 -- -- -- 77 -- (!) 89 %   04/15/21 1930 130/67 -- -- (!) 128 -- (!) 88 %   04/15/21 1915 112/73 -- -- (!) 124 -- 90 %   04/15/21 1900 118/70 -- -- 111 -- 91 %   04/15/21 1745 107/52 -- -- 59 -- 92 %   04/15/21 1744 107/52 98.5 °F (36.9 °C) Oral 83 15 92 %   04/15/21 1730 114/53 -- -- 74 -- 91 %   04/15/21 1715 106/51 -- -- 69 -- 92 %   04/15/21 1700 112/51 -- -- 77 -- 92 %   04/15/21 1645 115/62 -- -- (!) 141 -- 92 %        Flowsheet Rows      First Filed Value   Admission Height  162.6 cm (64\") Documented at 04/13/2021 1806   Admission Weight  72 kg (158 lb 11.7 oz) Documented at 04/13/2021 1806          Body mass index is 29.48 kg/m².      Intake/Output Summary (Last 24 hours) at 4/16/2021 1642  Last data filed at 4/16/2021 1545  Gross per 24 hour   Intake 480 ml   Output 400 ml   Net 80 ml        TELEMETRY:     SR  Paroxysms of atrial flutter with RVR    Physical Exam:  Constitutional:       Appearance: Well-developed.   Eyes:      General: No scleral icterus.        Right eye: No discharge.   HENT:      Head: Normocephalic and atraumatic.   Neck:      Thyroid: No thyromegaly.      Lymphadenopathy: No cervical adenopathy.   Pulmonary:      Effort: Pulmonary effort " is normal. No respiratory distress.      Breath sounds: Normal breath sounds. No wheezing. No rales.   Cardiovascular:      Normal rate. Regular rhythm.      No gallop.   Abdominal:      Tenderness: There is no abdominal tenderness.   Skin:     Findings: No erythema or rash.   Neurological:      Mental Status: Alert and oriented to person, place, and time.            Results Review:   I reviewed the patient's new clinical results.    CBC    Results from last 7 days   Lab Units 04/16/21  0230 04/15/21  0255 04/14/21  0349 04/13/21  1824   WBC 10*3/mm3 7.50 5.10 8.00 6.70   HEMOGLOBIN g/dL 12.3 11.2* 13.1 14.7   PLATELETS 10*3/mm3 194 187 233 267     BMP   Results from last 7 days   Lab Units 04/16/21  0230 04/15/21  1648 04/15/21  0255 04/14/21  1322 04/14/21  0349 04/13/21  1824   SODIUM mmol/L 140  --  139  --  139 139   POTASSIUM mmol/L 4.0 4.4 3.6 3.7 3.2* 3.3*   CHLORIDE mmol/L 107  --  108*  --  103 101   CO2 mmol/L 23.0  --  23.0  --  22.0 27.0   BUN mg/dL 9  --  9  --  16 14   CREATININE mg/dL 0.60  --  0.62  --  0.61 0.78   GLUCOSE mg/dL 118*  --  106*  --  102* 105*   MAGNESIUM mg/dL 1.7  --  1.7  --  1.8  --      Cr Clearance Estimated Creatinine Clearance: 69.9 mL/min (by C-G formula based on SCr of 0.6 mg/dL).  Coag     HbA1C No results found for: HGBA1C  Blood Glucose No results found for: POCGLU  Infection     CMP   Results from last 7 days   Lab Units 04/16/21  0230 04/15/21  1648 04/15/21  0255 04/14/21  1322 04/14/21  0349 04/13/21  1824   SODIUM mmol/L 140  --  139  --  139 139   POTASSIUM mmol/L 4.0 4.4 3.6 3.7 3.2* 3.3*   CHLORIDE mmol/L 107  --  108*  --  103 101   CO2 mmol/L 23.0  --  23.0  --  22.0 27.0   BUN mg/dL 9  --  9  --  16 14   CREATININE mg/dL 0.60  --  0.62  --  0.61 0.78   GLUCOSE mg/dL 118*  --  106*  --  102* 105*   ALBUMIN g/dL  --   --   --   --   --  4.10   BILIRUBIN mg/dL  --   --   --   --   --  0.2   ALK PHOS U/L  --   --   --   --   --  87   AST (SGOT) U/L  --   --   --    --   --  20   ALT (SGPT) U/L  --   --   --   --   --  20   LIPASE U/L  --   --   --   --   --  28     ABG      UA      LINETTE  No results found for: POCMETH, POCAMPHET, POCBARBITUR, POCBENZO, POCCOCAINE, POCOPIATES, POCOXYCODO, POCPHENCYC, POCPROPOXY, POCTHC, POCTRICYC  Lysis Labs   Results from last 7 days   Lab Units 04/16/21  0230 04/15/21  0255 04/14/21  0349 04/13/21  1824   HEMOGLOBIN g/dL 12.3 11.2* 13.1 14.7   PLATELETS 10*3/mm3 194 187 233 267   CREATININE mg/dL 0.60 0.62 0.61 0.78     Radiology(recent) No radiology results for the last day    Imaging Results (Last 24 Hours)     ** No results found for the last 24 hours. **          Results from last 7 days   Lab Units 04/13/21  2321   TROPONIN T ng/mL <0.010       EKG              I personally viewed and interpreted the patient's EKG/Telemetry data:        ECHOCARDIOGRAM:     Results for orders placed during the hospital encounter of 04/13/21    Adult Transthoracic Echo Complete W/ Cont if Necessary Per Protocol    Interpretation Summary  · Left ventricular systolic function is normal.  · Left ventricular ejection fraction is 55%  · Left ventricular wall thickness is consistent with moderate concentric hypertrophy.  · Left ventricular diastolic function was normal.  · The right atrial cavity is borderline dilated.  · Calculated right ventricular systolic pressure from tricuspid regurgitation is 29 mmHg.    Dr. Berg to review    STRESS MYOVIEW:      CARDIAC CATHETERIZATION:      OTHER:         Assessment/Plan            Chronic pain disorder    Gastroesophageal reflux disease    Hyperlipidemia    Hypertension    Palpitations    Atrial flutter with rapid ventricular response (CMS/HCC)        ASSESSMENT:    Atrial Flutter with RVR    -s/p EPS/right atrial flutter ablation    -recurrent atrial flutter this am; now on IV Cardizem  Norovirus infection  Abdominal Pain/Bloating  N/V  HTN by history    PLAN:    K+ stable  D/W Dr. Tran he advised to start Multaq  400 mg po BID  Will d/w Dr. Cardona re: starting a/c vs. Continue ASA  Still in and out of atrial flutter this am though overall rate is better controlled when in flutter .  Max      Additional recommendations per Dr. Berg    Patient is seen and examined and findings are verified.  Patient diarrhea is improving.  She wants to go home    Normal S1 and S2.  No pericardial rub or murmur.  No leg edema noted.    Patient is still going in and out of atrial fibrillation.  I discussed with Dr. Cardona and he wants to do ablation in future after tissue edema would improve in 1 to 2 months.  Multaq has been started.  Dr. Cardona does not want to start anticoagulation.  Only aspirin is recommended.          Olman Berg MD  04/16/21  16:42 EDT

## 2021-04-16 NOTE — PLAN OF CARE
Goal Outcome Evaluation:     Problem: Adult Inpatient Plan of Care  Goal: Plan of Care Review  Outcome: Ongoing, Progressing  Goal: Patient-Specific Goal (Individualized)  Outcome: Ongoing, Progressing  Goal: Absence of Hospital-Acquired Illness or Injury  Outcome: Ongoing, Progressing  Intervention: Identify and Manage Fall Risk  Recent Flowsheet Documentation  Taken 4/16/2021 0618 by Vivi Velazquez RN  Safety Promotion/Fall Prevention:   assistive device/personal items within reach   clutter free environment maintained   fall prevention program maintained   room organization consistent   safety round/check completed  Taken 4/16/2021 0415 by Vivi Velazquez RN  Safety Promotion/Fall Prevention:   assistive device/personal items within reach   clutter free environment maintained   fall prevention program maintained   room organization consistent   safety round/check completed  Taken 4/16/2021 0205 by Vivi Velazquez RN  Safety Promotion/Fall Prevention:   assistive device/personal items within reach   clutter free environment maintained   fall prevention program maintained   room organization consistent   safety round/check completed  Taken 4/16/2021 0005 by Vivi Velazquez RN  Safety Promotion/Fall Prevention:   assistive device/personal items within reach   clutter free environment maintained   fall prevention program maintained   room organization consistent   safety round/check completed  Taken 4/15/2021 2210 by Vivi Velazquez RN  Safety Promotion/Fall Prevention:   assistive device/personal items within reach   clutter free environment maintained   fall prevention program maintained   safety round/check completed   room organization consistent  Taken 4/15/2021 2000 by Vivi Velazquez RN  Safety Promotion/Fall Prevention:   assistive device/personal items within reach   clutter free environment maintained   fall prevention program maintained   room organization consistent   safety round/check completed  Intervention:  Prevent Skin Injury  Recent Flowsheet Documentation  Taken 4/15/2021 2000 by Vivi Velazquez RN  Body Position: supine  Intervention: Prevent Infection  Recent Flowsheet Documentation  Taken 4/16/2021 0618 by Vivi Velazquez RN  Infection Prevention:   environmental surveillance performed   hand hygiene promoted   personal protective equipment utilized   single patient room provided   visitors restricted/screened   rest/sleep promoted  Taken 4/16/2021 0415 by Vivi Velazquez RN  Infection Prevention:   environmental surveillance performed   hand hygiene promoted   rest/sleep promoted   single patient room provided   visitors restricted/screened   personal protective equipment utilized  Taken 4/16/2021 0205 by Vivi Velazquez RN  Infection Prevention:   environmental surveillance performed   hand hygiene promoted   personal protective equipment utilized   rest/sleep promoted   visitors restricted/screened   single patient room provided  Taken 4/16/2021 0005 by Vivi Velazquez RN  Infection Prevention:   environmental surveillance performed   hand hygiene promoted   personal protective equipment utilized   rest/sleep promoted   single patient room provided   visitors restricted/screened  Taken 4/15/2021 2210 by Vivi Velazquez RN  Infection Prevention:   environmental surveillance performed   hand hygiene promoted   personal protective equipment utilized   rest/sleep promoted   single patient room provided   visitors restricted/screened  Taken 4/15/2021 2000 by Vivi Velazquez RN  Infection Prevention:   environmental surveillance performed   hand hygiene promoted   personal protective equipment utilized   single patient room provided   rest/sleep promoted   visitors restricted/screened  Goal: Optimal Comfort and Wellbeing  Outcome: Ongoing, Progressing  Intervention: Provide Person-Centered Care  Recent Flowsheet Documentation  Taken 4/16/2021 0415 by Vivi Velazquez RN  Trust Relationship/Rapport: care explained  Taken 4/16/2021  0005 by Vivi Velazquez RN  Trust Relationship/Rapport: care explained  Taken 4/15/2021 2000 by Vivi Velazquez RN  Trust Relationship/Rapport: care explained  Goal: Readiness for Transition of Care  Outcome: Ongoing, Progressing     Problem: Fall Injury Risk  Goal: Absence of Fall and Fall-Related Injury  Outcome: Ongoing, Progressing  Intervention: Identify and Manage Contributors to Fall Injury Risk  Recent Flowsheet Documentation  Taken 4/16/2021 0618 by Vivi Velazquez RN  Medication Review/Management: medications reviewed  Taken 4/16/2021 0415 by Vivi Velazquez RN  Medication Review/Management: medications reviewed  Taken 4/16/2021 0205 by Vivi Velazquez RN  Medication Review/Management: medications reviewed  Taken 4/16/2021 0005 by Vivi Velazquez RN  Medication Review/Management: medications reviewed  Taken 4/15/2021 2210 by Vivi Velazquez RN  Medication Review/Management: medications reviewed  Taken 4/15/2021 2000 by Vivi Velazquez RN  Medication Review/Management:   medications reviewed   infusion titrated  Intervention: Promote Injury-Free Environment  Recent Flowsheet Documentation  Taken 4/16/2021 0618 by Vivi Velazquez RN  Safety Promotion/Fall Prevention:   assistive device/personal items within reach   clutter free environment maintained   fall prevention program maintained   room organization consistent   safety round/check completed  Taken 4/16/2021 0415 by Vivi Velazquez RN  Safety Promotion/Fall Prevention:   assistive device/personal items within reach   clutter free environment maintained   fall prevention program maintained   room organization consistent   safety round/check completed  Taken 4/16/2021 0205 by Vivi Velazquez RN  Safety Promotion/Fall Prevention:   assistive device/personal items within reach   clutter free environment maintained   fall prevention program maintained   room organization consistent   safety round/check completed  Taken 4/16/2021 0005 by Vivi Velazquez RN  Safety Promotion/Fall  Prevention:   assistive device/personal items within reach   clutter free environment maintained   fall prevention program maintained   room organization consistent   safety round/check completed  Taken 4/15/2021 2210 by Vivi Velazquez RN  Safety Promotion/Fall Prevention:   assistive device/personal items within reach   clutter free environment maintained   fall prevention program maintained   safety round/check completed   room organization consistent  Taken 4/15/2021 2000 by Vivi Velazquez, RN  Safety Promotion/Fall Prevention:   assistive device/personal items within reach   clutter free environment maintained   fall prevention program maintained   room organization consistent   safety round/check completed     Problem: Arrhythmia/Dysrhythmia  Goal: Normalized Cardiac Rhythm  Outcome: Ongoing, Progressing

## 2021-04-16 NOTE — PLAN OF CARE
Goal Outcome Evaluation:         Patient remains up as tolerated. Patient continues to go back and forth from NSR to Afib. Per Dr. Tran with Cardiology patient is good to discharge with follow up in 2 weeks. Plans to possibly discharge this evening. Vitals stable, will continue to monitor.     Problem: Adult Inpatient Plan of Care  Goal: Plan of Care Review  Outcome: Ongoing, Progressing  Flowsheets (Taken 4/14/2021 0200 by Oumou Pa RN)  Plan of Care Reviewed With: patient  Goal: Patient-Specific Goal (Individualized)  Outcome: Ongoing, Progressing  Goal: Absence of Hospital-Acquired Illness or Injury  Outcome: Ongoing, Progressing  Intervention: Identify and Manage Fall Risk  Recent Flowsheet Documentation  Taken 4/16/2021 1200 by Mireya Sanchez, RN  Safety Promotion/Fall Prevention:   activity supervised   assistive device/personal items within reach   clutter free environment maintained   fall prevention program maintained   lighting adjusted   nonskid shoes/slippers when out of bed   room organization consistent   safety round/check completed  Taken 4/16/2021 1000 by Mireya Sanchez, RN  Safety Promotion/Fall Prevention:   assistive device/personal items within reach   clutter free environment maintained   lighting adjusted   nonskid shoes/slippers when out of bed   room organization consistent   safety round/check completed  Taken 4/16/2021 0800 by Mireya Sanchez, RN  Safety Promotion/Fall Prevention:   assistive device/personal items within reach   clutter free environment maintained   lighting adjusted   nonskid shoes/slippers when out of bed   room organization consistent   safety round/check completed  Taken 4/16/2021 0759 by Mireya Sanchez, RN  Safety Promotion/Fall Prevention:   assistive device/personal items within reach   clutter free environment maintained   lighting adjusted   nonskid shoes/slippers when out of bed   room organization consistent   safety round/check  completed  Intervention: Prevent Skin Injury  Recent Flowsheet Documentation  Taken 4/16/2021 1200 by Mireya Sanchez RN  Body Position: position changed independently  Skin Protection:   adhesive use limited   incontinence pads utilized   transparent dressing maintained   tubing/devices free from skin contact  Taken 4/16/2021 0759 by Mireya Sanchez RN  Body Position:   supine   position changed independently  Skin Protection:   adhesive use limited   transparent dressing maintained   tubing/devices free from skin contact  Intervention: Prevent Infection  Recent Flowsheet Documentation  Taken 4/16/2021 1200 by Mireya Sanchez RN  Infection Prevention:   personal protective equipment utilized   hand hygiene promoted  Taken 4/16/2021 1000 by Mireya Sanchez RN  Infection Prevention:   personal protective equipment utilized   hand hygiene promoted  Taken 4/16/2021 0800 by Mireya Sanchez RN  Infection Prevention:   personal protective equipment utilized   hand hygiene promoted  Taken 4/16/2021 0759 by Mireya Sanchez RN  Infection Prevention:   personal protective equipment utilized   hand hygiene promoted  Goal: Optimal Comfort and Wellbeing  Outcome: Ongoing, Progressing  Intervention: Provide Person-Centered Care  Recent Flowsheet Documentation  Taken 4/16/2021 1200 by Mireya Sanchez RN  Trust Relationship/Rapport:   care explained   choices provided   emotional support provided   questions answered   questions encouraged   thoughts/feelings acknowledged  Taken 4/16/2021 0759 by Mireya Sanchez RN  Trust Relationship/Rapport:   care explained   choices provided   emotional support provided   questions answered   questions encouraged   thoughts/feelings acknowledged  Goal: Readiness for Transition of Care  Outcome: Ongoing, Progressing     Problem: Fall Injury Risk  Goal: Absence of Fall and Fall-Related Injury  Outcome: Ongoing, Progressing  Intervention: Identify and Manage Contributors to Fall Injury  Risk  Recent Flowsheet Documentation  Taken 4/16/2021 1200 by Mireya Sanchez RN  Medication Review/Management:   medications reviewed   high-risk medications identified  Self-Care Promotion: independence encouraged  Taken 4/16/2021 1000 by Mireya Sanchez RN  Medication Review/Management:   medications reviewed   high-risk medications identified  Taken 4/16/2021 0800 by Mireya Sanchez RN  Medication Review/Management:   medications reviewed   high-risk medications identified  Taken 4/16/2021 0759 by Mireya Sanchez RN  Medication Review/Management:   medications reviewed   high-risk medications identified  Self-Care Promotion: independence encouraged  Intervention: Promote Injury-Free Environment  Recent Flowsheet Documentation  Taken 4/16/2021 1200 by Mireya Sanchez RN  Safety Promotion/Fall Prevention:   activity supervised   assistive device/personal items within reach   clutter free environment maintained   fall prevention program maintained   lighting adjusted   nonskid shoes/slippers when out of bed   room organization consistent   safety round/check completed  Taken 4/16/2021 1000 by Mireya Sanchez RN  Safety Promotion/Fall Prevention:   assistive device/personal items within reach   clutter free environment maintained   lighting adjusted   nonskid shoes/slippers when out of bed   room organization consistent   safety round/check completed  Taken 4/16/2021 0800 by Mireya Sanchez RN  Safety Promotion/Fall Prevention:   assistive device/personal items within reach   clutter free environment maintained   lighting adjusted   nonskid shoes/slippers when out of bed   room organization consistent   safety round/check completed  Taken 4/16/2021 0759 by Mireya Sanchez RN  Safety Promotion/Fall Prevention:   assistive device/personal items within reach   clutter free environment maintained   lighting adjusted   nonskid shoes/slippers when out of bed   room organization consistent   safety round/check  completed     Problem: Arrhythmia/Dysrhythmia  Goal: Normalized Cardiac Rhythm  Outcome: Ongoing, Progressing  Intervention: Monitor and Manage Cardiac Rhythm Effects  Recent Flowsheet Documentation  Taken 4/16/2021 1200 by Mireya Sanchez RN  Dysrhythmia Management: (PO Cardiac meds) other (see comments)  Taken 4/16/2021 0759 by Mireya Sanchez, RN  Dysrhythmia Management: (PO Cardiac meds) other (see comments)

## 2021-04-16 NOTE — NURSING NOTE
Per Dr. Tran with Cardiology, patient is approved to discharge with follow up in 2-3 weeks. Per Dr. Berg with Cardiology patient is signed off for discharge. Follow up in 1 month.

## 2021-04-16 NOTE — DISCHARGE SUMMARY
Date of Admission: 4/13/2021    Date of Discharge:  4/16/2021    Length of stay:  LOS: 2 days     Discharge Diagnosis:     Atrial flutter status post ablation    Presenting Problem List:    Atrial flutter-patient presented to the emergency department after being seen in urgent care center for GI concerns.  Patient advised to seek care in the emergency department after finding of patient's tachyarrhythmia, patient found to be in atrial flutter, with refractory episodes after ablation  -Cardiology consulted  -Echo obtained showing EF of 55% with moderate concentric left ventricular hypertrophy normal diastolic function  -Stable rate control on Multaq, beta-blocker and calcium channel blocker  -Electrophysiology consulted, patient status post ablation  -Troponins negative  -Monitor for possible causes of tachyarrhythmia including GI infection  -Patient's with mildly elevated BNP which may be secondary to tachyarrhythmia  -No anticoagulation required  -Patient on stable regimen of metoprolol 25 mg, Cardizem 240 mg daily and Multaq 400 twice a day  -Outpatient follow-up organized with cardiology and electrophysiology     Norovirus infection-patient noted loose stool and abdominal pain intermittently this hospital stay patient reports GI issues on and off for multiple weeks.    Significantly improved  -Antiemetics  -Patient showing signs of volume depletion, starting IV fluids with bolus given, now weaning as patient showing signs of third spacing  -CT abdomen pelvis showing no acute pathology  -PPI while hospitalized  -Lipase normal  -GI panel positive for norovirus     Hypokalemia-most likely secondary to GI loss  -Replace  -Normalizing     Anemia-patient's hemoglobin 13 on admission likely due to hemodilution no obvious signs of bleeding  -Monitor daily     Hypertension/anxiety/depression/vitamin deficiencies-chronic in nature  -Resume home medication as clinically appropriate    HPI/Hospital Course:  66-year-old  "female presents to the ER with a chief complaint of identified accelerated heart rate, atrial fibrillation at the urgent care center today.  The patient had gone to the urgent care center secondary to acute epigastric pain with vomiting x2 days.  The patient states she had not felt well for several days with epigastric discomfort with radiation to the back.  Sunday she started vomiting and vomited multiple times for 2 days without any hematemesis.  The patient states her  insisted she seek medical attention today so she went to the urgent care center where she was noted to have atrial fibrillation with RVR.  Patient denies any chest pain, subjective fever or chills, shortness of breath.  The patient does report change in her stool which she describes as flat and ribbony with episodes of blood mixed with mucus.  The patient reports she had a colonoscopy a little over 10 years ago and was told that it was \"clean\" with recommendation for 10-year follow-up.  The patient states she did not notice she had irregular heartbeat or accelerated heartbeat until it was found at the urgent care center.     Review of records: The patient was seen by her primary care provider January 2021 and restarted on blood pressure medicine, amlodipine and medication for depression, Prozac.  The patient had stopped taking these medications for a couple of years but was ready to get restarted on them.  She had follow-up appointment February 2021 with increase of her amlodipine from 5 mg to 10 mg daily and increase of her Prozac from 20 mg daily to 40 mg daily.     4/14/2021: Patient reporting still having loose stool.  CT abdomen pelvis unremarkable GI panel pending.  No shortness of breath no chest pain at this time.  Patient still with heart rate in 140s to 150s showing atrial flutter, getting beta-blocker and calcium channel blockers.  Cardiology evaluating patient electrophysiology consulted.  Echo obtained and read " pending.    4/15/2021:Patient found to be positive for norovirus.  Patient reports she regularly cares for a grandchild who is a toddler, likely source.  Diarrhea resolving abdominal pain improving.  Patient status post ablation for atrial flutter, patient sinus with intermittent episodes of breakthrough flutter.  Patient started on Multaq and metoprolol.  Patient notes palpitations periodically but shortness of breath improving.    4/16/2021: Patient rate controlled at this time.  No anticoagulation required.  Patient cleared by cardiology and electrophysiology for discharge.  Patient without chest pain, shortness of breath or palpitations.  Patient to continue rate control medication and aspirin and follow-up with cardiology services in the next few weeks.  Patient able to be discharged home in good condition with strict return precautions given.    Procedures Performed    Procedure(s):  EP/Ablation  -------------------       Consults:   Consults     Date and Time Order Name Status Description    4/14/2021  9:09 AM Inpatient Cardiac Electrophysiology Consult Completed     4/14/2021  4:12 AM Inpatient Cardiology Consult Completed     4/13/2021  7:48 PM Hospitalist (on-call MD unless specified) Completed           Pertinent Test Results:     Lab Results (last 24 hours)     Procedure Component Value Units Date/Time    Basic Metabolic Panel [[id number]]  (Abnormal) Collected: 04/16/21 0230    Specimen: Blood Updated: 04/16/21 0313     Glucose 118 mg/dL      BUN 9 mg/dL      Creatinine 0.60 mg/dL      Sodium 140 mmol/L      Potassium 4.0 mmol/L      Comment: Slight hemolysis detected by analyzer. Results may be affected.        Chloride 107 mmol/L      CO2 23.0 mmol/L      Calcium 8.0 mg/dL      eGFR Non African Amer 100 mL/min/1.73      BUN/Creatinine Ratio 15.0     Anion Gap 10.0 mmol/L     Narrative:      GFR Normal >60  Chronic Kidney Disease <60  Kidney Failure <15      Magnesium [[id number]]  (Normal) Collected:  04/16/21 0230    Specimen: Blood Updated: 04/16/21 0313     Magnesium 1.7 mg/dL     CBC & Differential [[id number]]  (Abnormal) Collected: 04/16/21 0230    Specimen: Blood Updated: 04/16/21 0256    Narrative:      The following orders were created for panel order CBC & Differential.  Procedure                               Abnormality         Status                     ---------                               -----------         ------                     CBC Auto Differential[[id number]]        Abnormal            Final result                 Please view results for these tests on the individual orders.    CBC Auto Differential [[id number]]  (Abnormal) Collected: 04/16/21 0230    Specimen: Blood Updated: 04/16/21 0256     WBC 7.50 10*3/mm3      RBC 4.33 10*6/mm3      Hemoglobin 12.3 g/dL      Hematocrit 37.1 %      MCV 85.8 fL      MCH 28.5 pg      MCHC 33.2 g/dL      RDW 13.9 %      RDW-SD 42.4 fl      MPV 9.9 fL      Platelets 194 10*3/mm3      Neutrophil % 71.2 %      Lymphocyte % 18.9 %      Monocyte % 8.6 %      Eosinophil % 1.0 %      Basophil % 0.3 %      Neutrophils, Absolute 5.40 10*3/mm3      Lymphocytes, Absolute 1.40 10*3/mm3      Monocytes, Absolute 0.60 10*3/mm3      Eosinophils, Absolute 0.10 10*3/mm3      Basophils, Absolute 0.00 10*3/mm3      nRBC 0.1 /100 WBC     Potassium [[id number]]  (Normal) Collected: 04/15/21 1648    Specimen: Blood Updated: 04/15/21 1801     Potassium 4.4 mmol/L           Microbiology Results Abnormal     Procedure Component Value - Date/Time    Gastrointestinal Panel, PCR - Stool, Per Rectum [[id number]]  (Abnormal) Collected: 04/14/21 0942    Lab Status: Final result Specimen: Stool from Per Rectum Updated: 04/14/21 1138     Campylobacter Not Detected     Plesiomonas shigelloides Not Detected     Salmonella Not Detected     Vibrio Not Detected     Vibrio cholerae Not Detected     Yersinia enterocolitica Not Detected     Enteroaggregative E. coli (EAEC) Not Detected      Enteropathogenic E. coli (EPEC) Not Detected     Enterotoxigenic E. coli (ETEC) lt/st Not Detected     Shiga-like toxin-producing E. coli (STEC) stx1/stx2 Not Detected     Shigella/Enteroinvasive E. coli (EIEC) Not Detected     Cryptosporidium Not Detected     Cyclospora cayetanensis Not Detected     Entamoeba histolytica Not Detected     Giardia lamblia Not Detected     Adenovirus F40/41 Not Detected     Astrovirus Not Detected     Norovirus GI/GII Detected     Rotavirus A Not Detected     Sapovirus (I, II, IV or V) Not Detected    COVID-19, [facility] IN-HOUSE,NASAL Swab (NO TRANSPORT MEDIA) 2 HR TAT - Swab, Nasopharynx [[id number]]  (Normal) Collected: 04/1955    Lab Status: Final result Specimen: Swab from Nasopharynx Updated: 04/13/21 2031     COVID19 Presumptive Negative    Narrative:      Fact sheet for providers: https://www.fda.gov/media/608426/download     Fact sheet for patients: https://www.fda.gov/media/690559/download    Test performed by PCR.  If inconsistent with clinical signs and symptoms patient should be tested with different authorized molecular test.        No radiology results for the last day    Results for orders placed during the hospital encounter of 04/13/21    Adult Transthoracic Echo Complete W/ Cont if Necessary Per Protocol    Interpretation Summary  · Left ventricular systolic function is normal.  · Left ventricular ejection fraction is 55%  · Left ventricular wall thickness is consistent with moderate concentric hypertrophy.  · Left ventricular diastolic function was normal.  · The right atrial cavity is borderline dilated.  · Calculated right ventricular systolic pressure from tricuspid regurgitation is 29 mmHg.        Condition on Discharge:  Good    Vital Signs  Temp:  [97.4 °F (36.3 °C)-98.5 °F (36.9 °C)] 98.4 °F (36.9 °C)  Heart Rate:  [] 111  Resp:  [15-21] 15  BP: ()/(44-73) 107/70    Physical Exam:    General: Elderly female lying in bed breathing comfortably on  room air no acute distress  HEENT: NC/AT, EOMI, mucosa moist  Heart: Regular, rate controlled  Chest: Normal work of breathing moving air well no wheezing or crackles  Abdominal: Soft.  Mild distention, nontender  Musculoskeletal: Normal ROM.  No cyanosis. No calf tenderness.  Neurological: AAOx3, no focal deficits  Skin: Skin is warm and dry. No rash  Psychiatric: Normal mood and affect.    Discharge Disposition      Discharge Medications     Discharge Medications      ASK your doctor about these medications      Instructions Start Date   amLODIPine 10 MG tablet  Commonly known as: NORVASC   10 mg, Oral, Daily      cholecalciferol 25 MCG (1000 UT) tablet  Commonly known as: VITAMIN D3   1,000 Units, Oral, Daily      FLUoxetine 20 MG capsule  Commonly known as: PROzac   40 mg, Oral, Daily      vitamin B-12 1000 MCG tablet  Commonly known as: CYANOCOBALAMIN   1,000 mcg, Oral, Daily             Discharge Diet:       Activity at Discharge:       Follow-up Appointments  Future Appointments   Date Time Provider Department Center   4/28/2021  7:45 AM Chloe Segura MD [facility]   5/18/2021  7:45 AM Chloe Segura MD [facility]         Test Results Pending at Discharge       Risk for Readmission (LACE) Score: 6 (4/16/2021  6:01 AM)        Jacques Diamond MD  04/16/21  13:17 EDT      Time: Discharge 35 min total time spent with face-to-face history/exam, writing all prescriptions, preparing medication reconciliation, and documenting discharge data including chart review of the full admission, care co-ordination with patient, family, , and , etc., and follow-up appointments with PCP and specialists as recommended.

## 2021-04-16 NOTE — PROGRESS NOTES
CC--recurrent atrial flutter post ablation, viral gastroenteritis      Subject  Continues to have mild abdominal bloating, no further diarrhea.  She denies any chest pain or dyspnea and complains of palpitations    Patient seen and examined, chart and labs reviewed.  Reports feeling much better today.      Past Medical History:   Diagnosis Date   • Hypertension    • Hyperthyroidism      Past Surgical History:   Procedure Laterality Date   • BACK SURGERY     • CARDIAC ELECTROPHYSIOLOGY PROCEDURE N/A 4/14/2021    Procedure: EP/Ablation;  Surgeon: Ori Tran MD;  [facility];  Service: Cardiovascular;  Laterality: N/A;   Review of Systems   General:  positive for fatigue and tiredness  Eyes: No redness  Cardiovascular: No chest pain, positive for  palpitations  Respiratory:   no shortness of breath  Genitourinary: no hematuria or dysuria  Musculoskeletal: No arthralgia or myalgia  Skin: No rash  Neurologic: No numbness, tingling, syncope  Hematologic/Lymphatic: No abnormal bleeding.      Physical Exam  VITALS REVIEWED--- blood pressure 107/66 pulse rate is 100 patient is afebrile respiration 12 times a minute    General:      well developed, well nourished, in no acute distress.    Head:      normocephalic and atraumatic.    Eyes:      PERRL/EOM intact, conjunctiva and sclera clear with out nystagmus.    Neck:      no masses, thyromegaly,  trachea central with normal respiratory effort   Lungs:      clear bilaterally to auscultation.    Heart:       underlying atrial fibrillation with moderately controlled ventricular rate without any murmurs gallops or rubs  Pulses:      pulses normal in all 4 extremities.  Right groin Soft without hematoma  Extremities:       no cyanosis or clubbing  Neurologic:      no focal deficits.   alert oriented x3    Psych:      alert and cooperative; normal mood and affect; normal attention span and concentration.          CBC    Results from last 7 days    Lab Units 04/16/21  0230 04/15/21  0255 04/14/21  0349 04/13/21  1824   WBC 10*3/mm3 7.50 5.10 8.00 6.70   HEMOGLOBIN g/dL 12.3 11.2* 13.1 14.7   PLATELETS 10*3/mm3 194 187 233 267     BMP   Results from last 7 days   Lab Units 04/16/21  0230 04/15/21  1648 04/15/21  0255 04/14/21  1322 04/14/21  0349 04/13/21  1824   SODIUM mmol/L 140  --  139  --  139 139   POTASSIUM mmol/L 4.0 4.4 3.6 3.7 3.2* 3.3*   CHLORIDE mmol/L 107  --  108*  --  103 101   CO2 mmol/L 23.0  --  23.0  --  22.0 27.0   BUN mg/dL 9  --  9  --  16 14   CREATININE mg/dL 0.60  --  0.62  --  0.61 0.78   GLUCOSE mg/dL 118*  --  106*  --  102* 105*   MAGNESIUM mg/dL 1.7  --  1.7  --  1.8  --                Assessment and plan    Counterclockwise right atrial flutter status post complex ablation needing extensive ablation because of decreased numbers with recurrence  Viral gastroenteritis    Recommendations  Continue oral Cardizem  Patient started on dronedarone   Lovenox DC'd  Continue aspirin  Patient can be discharged per EP  No anticoagulation indicated at this time  Follow-up in 2 to 3 weeks in the office  Patient will likely need a redo ablation after 3 to 4 weeks  discussed extensively with the patient and instructions given to the nurse        Electronically signed by MONIK Null, 04/16/21, 12:40 PM EDT.

## 2021-04-17 ENCOUNTER — TRANSITIONAL CARE MANAGEMENT TELEPHONE ENCOUNTER (OUTPATIENT)
Dept: CALL CENTER | Facility: HOSPITAL | Age: 66
End: 2021-04-17

## 2021-04-17 NOTE — OUTREACH NOTE
Prep Survey      Responses   [facility]   Is LACE score < 7 ?  Yes   Emergency Room discharge w/ pulse ox?  No   Eligibility  TCM   Hospital  [facility]   Date of Admission  04/13/21   Date of Discharge  04/16/21   Discharge Disposition  Home or Self Care   Discharge diagnosis  Atrial flutter status post ablation   Does the patient have one of the following disease processes/diagnoses(primary or secondary)?  Other   Does the patient have Home health ordered?  No   Is there a DME ordered?  No   Prep survey completed?  Yes          Spring Carlin RN

## 2021-04-17 NOTE — OUTREACH NOTE
Call Center TCM Note      Responses   [facility] discharged from?  [facility]   Does the patient have one of the following disease processes/diagnoses(primary or secondary)?  Other   TCM attempt successful?  Yes   Call start time  1410   Call end time  1414   Discharge diagnosis  Atrial flutter status post ablation   Meds reviewed with patient/caregiver?  Yes   Is the patient having any side effects they believe may be caused by any medication additions or changes?  No   Does the patient have all medications ordered at discharge?  Yes   Is the patient taking all medications as directed (includes completed medication regime)?  Yes   Does the patient have a primary care provider?   Yes   Does the patient have an appointment with their PCP within 7 days of discharge?  Yes   Comments regarding PCP  Hospital d/c f/u appt is on 4/28/21 at 7:45 am    Has the patient kept scheduled appointments due by today?  N/A   Psychosocial issues?  No   Did the patient receive a copy of their discharge instructions?  Yes   Nursing interventions  Reviewed instructions with patient   What is the patient's perception of their health status since discharge?  Worsening [Pt reports she's very weak]   Is the patient/caregiver able to teach back signs and symptoms related to disease process for when to call PCP?  Yes   Is the patient/caregiver able to teach back signs and symptoms related to disease process for when to call 911?  Yes   Is the patient/caregiver able to teach back the hierarchy of who to call/visit for symptoms/problems? PCP, Specialist, Home health nurse, Urgent Care, ED, 911  Yes   If the patient is a current smoker, are they able to teach back resources for cessation?  Not a smoker   TCM call completed?  Yes          Alessandra Goodwin RN    4/17/2021, 14:14 EDT

## 2021-04-19 ENCOUNTER — TELEPHONE (OUTPATIENT)
Dept: FAMILY MEDICINE CLINIC | Facility: CLINIC | Age: 66
End: 2021-04-19

## 2021-04-19 NOTE — CASE MANAGEMENT/SOCIAL WORK
Case Management Discharge Note                Selected Continued Care - Discharged on 4/16/2021 Admission date: 4/13/2021 - Discharge disposition: Home or Self Care                 Final Discharge Disposition Code: 01 - home or self-care

## 2021-04-19 NOTE — TELEPHONE ENCOUNTER
I received TCM note.  If patient is feeling worse and super weak she may need to go back to the ER or follow up with cardiology ASAP

## 2021-04-28 ENCOUNTER — HOSPITAL ENCOUNTER (OUTPATIENT)
Dept: GENERAL RADIOLOGY | Facility: HOSPITAL | Age: 66
Discharge: HOME | End: 2021-04-28

## 2021-04-28 ENCOUNTER — OFFICE VISIT (OUTPATIENT)
Dept: FAMILY MEDICINE CLINIC | Facility: CLINIC | Age: 66
End: 2021-04-28

## 2021-04-28 ENCOUNTER — LAB (OUTPATIENT)
Dept: LAB | Facility: HOSPITAL | Age: 66
End: 2021-04-28

## 2021-04-28 VITALS
SYSTOLIC BLOOD PRESSURE: 132 MMHG | WEIGHT: 160 LBS | HEART RATE: 71 BPM | TEMPERATURE: 97.1 F | DIASTOLIC BLOOD PRESSURE: 67 MMHG | OXYGEN SATURATION: 96 % | HEIGHT: 64 IN | BODY MASS INDEX: 27.31 KG/M2

## 2021-04-28 DIAGNOSIS — R53.83 FATIGUE, UNSPECIFIED TYPE: ICD-10-CM

## 2021-04-28 DIAGNOSIS — A08.11 NOROVIRUS: ICD-10-CM

## 2021-04-28 DIAGNOSIS — R07.9 CHEST PAIN, UNSPECIFIED TYPE: ICD-10-CM

## 2021-04-28 DIAGNOSIS — I48.91 ATRIAL FIBRILLATION, UNSPECIFIED TYPE (HCC): ICD-10-CM

## 2021-04-28 DIAGNOSIS — R06.02 SHORTNESS OF BREATH: Primary | ICD-10-CM

## 2021-04-28 DIAGNOSIS — R06.02 SHORTNESS OF BREATH: ICD-10-CM

## 2021-04-28 LAB
ANION GAP SERPL CALCULATED.3IONS-SCNC: 11 MMOL/L (ref 5–15)
BASOPHILS # BLD AUTO: 0.1 10*3/MM3 (ref 0–0.2)
BASOPHILS NFR BLD AUTO: 0.9 % (ref 0–1.5)
BUN SERPL-MCNC: 13 MG/DL (ref 8–23)
BUN/CREAT SERPL: 16.9 (ref 7–25)
CALCIUM SPEC-SCNC: 9.1 MG/DL (ref 8.6–10.5)
CHLORIDE SERPL-SCNC: 102 MMOL/L (ref 98–107)
CO2 SERPL-SCNC: 27 MMOL/L (ref 22–29)
CREAT SERPL-MCNC: 0.77 MG/DL (ref 0.57–1)
DEPRECATED RDW RBC AUTO: 42.9 FL (ref 37–54)
EOSINOPHIL # BLD AUTO: 0.2 10*3/MM3 (ref 0–0.4)
EOSINOPHIL NFR BLD AUTO: 1.8 % (ref 0.3–6.2)
ERYTHROCYTE [DISTWIDTH] IN BLOOD BY AUTOMATED COUNT: 14.2 % (ref 12.3–15.4)
GFR SERPL CREATININE-BSD FRML MDRD: 75 ML/MIN/1.73
GLUCOSE SERPL-MCNC: 109 MG/DL (ref 65–99)
HCT VFR BLD AUTO: 40.6 % (ref 34–46.6)
HGB BLD-MCNC: 13.3 G/DL (ref 12–15.9)
LYMPHOCYTES # BLD AUTO: 2 10*3/MM3 (ref 0.7–3.1)
LYMPHOCYTES NFR BLD AUTO: 19.5 % (ref 19.6–45.3)
MCH RBC QN AUTO: 28.1 PG (ref 26.6–33)
MCHC RBC AUTO-ENTMCNC: 32.8 G/DL (ref 31.5–35.7)
MCV RBC AUTO: 85.8 FL (ref 79–97)
MONOCYTES # BLD AUTO: 0.9 10*3/MM3 (ref 0.1–0.9)
MONOCYTES NFR BLD AUTO: 8.4 % (ref 5–12)
NEUTROPHILS NFR BLD AUTO: 69.4 % (ref 42.7–76)
NEUTROPHILS NFR BLD AUTO: 7.3 10*3/MM3 (ref 1.7–7)
NRBC BLD AUTO-RTO: 0.1 /100 WBC (ref 0–0.2)
PLATELET # BLD AUTO: 374 10*3/MM3 (ref 140–450)
PMV BLD AUTO: 9.3 FL (ref 6–12)
POTASSIUM SERPL-SCNC: 4.4 MMOL/L (ref 3.5–5.2)
RBC # BLD AUTO: 4.73 10*6/MM3 (ref 3.77–5.28)
SODIUM SERPL-SCNC: 140 MMOL/L (ref 136–145)
WBC # BLD AUTO: 10.5 10*3/MM3 (ref 3.4–10.8)

## 2021-04-28 PROCEDURE — 71046 X-RAY EXAM CHEST 2 VIEWS: CPT

## 2021-04-28 PROCEDURE — 85025 COMPLETE CBC W/AUTO DIFF WBC: CPT

## 2021-04-28 PROCEDURE — 36415 COLL VENOUS BLD VENIPUNCTURE: CPT

## 2021-04-28 PROCEDURE — 93000 ELECTROCARDIOGRAM COMPLETE: CPT | Performed by: FAMILY MEDICINE

## 2021-04-28 PROCEDURE — 99214 OFFICE O/P EST MOD 30 MIN: CPT | Performed by: FAMILY MEDICINE

## 2021-04-28 PROCEDURE — 80048 BASIC METABOLIC PNL TOTAL CA: CPT

## 2021-04-28 RX ORDER — FUROSEMIDE 20 MG/1
20 TABLET ORAL DAILY
Qty: 30 TABLET | Refills: 0 | Status: ON HOLD | OUTPATIENT
Start: 2021-04-28 | End: 2021-05-11

## 2021-04-28 RX ORDER — POTASSIUM CHLORIDE 750 MG/1
10 TABLET, FILM COATED, EXTENDED RELEASE ORAL DAILY
Qty: 30 TABLET | Refills: 0 | Status: ON HOLD | OUTPATIENT
Start: 2021-04-28 | End: 2021-05-11

## 2021-04-28 NOTE — PROGRESS NOTES
Answers for HPI/ROS submitted by the patient on 4/26/2021  Please describe your symptoms.: Was hospitalized, for A-fib, I am having trouble breathing, hard to take a deep breath.. stomach issues and rectum discharge (mucus) with watery blood sometimes...  Have you had these symptoms before?: No  How long have you been having these symptoms?: Greater than 2 weeks  Please list any medications you are currently taking for this condition.: I'll bring them to appointment!  Please describe any probable cause for these symptoms. : Heart is not working properly....  What is the primary reason for your visit?: Other    Subjective   Katarina Phillips is a 66 y.o. female.     She comes in today with a few complaints.  1 is she is following up from the hospital after recent admission with atrial fibrillation  She was also having problems with diarrhea and some bloody stools while she was hospitalized  CT scan of her abdomen was fine but she was found to be positive for norovirus  She cares for her grandchild  C/o left sided chest pain and dyspnea  Radiates into her post thorax  6/10  Nagging pain  She has felt SOA since the day she was d/c from hosp  Seems to be getting worse  Wheezing   Breathing is easier if she sits up  No covid vaccines  No visitors except her great-granddaughter that lives with her - 5 y/o preschooler  Some nausea but vomiting has resolved  No diarrhea for the last few days  No fever  Feels very thirsty  Says she feels dehydrated  New meds make her feel nauseated  She always tries to eat early before taking them  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  Past Medical History:   Diagnosis Date   • Hypertension    • Hyperthyroidism      Past Surgical History:   Procedure Laterality Date   • BACK SURGERY     • CARDIAC ELECTROPHYSIOLOGY PROCEDURE N/A 2021    Procedure: EP/Ablation;  Surgeon: Ori Tran MD;  Location: [facility] INVASIVE LOCATION;  Service: Cardiovascular;  Laterality: N/A;     History reviewed. No pertinent family history.  Social History     Socioeconomic History   • Marital status:      Spouse name: Not on file   • Number of children: Not on file   • Years of education: Not on file   • Highest education level: Not on file   Tobacco Use   • Smoking status: Former Smoker     Quit date: 1992     Years since quittin.0   • Smokeless tobacco: Never Used   Vaping Use   • Vaping Use: Never used   Substance and Sexual Activity   • Alcohol use: Not Currently   • Drug use: Never   • Sexual activity: Defer         Current Outpatient Medications:   •  aspirin 81 MG EC tablet, Take 1 tablet by mouth Daily for 30 days., Disp: 30 tablet, Rfl: 0  •  cholecalciferol (VITAMIN D3) 25 MCG (1000 UT) tablet, Take 1,000 Units by mouth Daily., Disp: , Rfl:   •  dilTIAZem LA (Cardizem LA) 240 MG 24 hr tablet, Take 1 tablet by mouth Daily for 30 days., Disp: 30 tablet, Rfl: 0  •  dronedarone (MULTAQ) 400 MG tablet, Take 1 tablet by mouth Every 12 (Twelve) Hours for 30 days., Disp: 60 tablet, Rfl: 0  •  FLUoxetine (PROzac) 20 MG capsule, Take 2 capsules by mouth Daily., Disp: 90 capsule, Rfl: 1  •  metoprolol succinate XL (TOPROL-XL) 25 MG 24 hr tablet, Take 1 tablet by mouth Daily for 30 days., Disp: 30 tablet, Rfl: 0  •  vitamin B-12 (CYANOCOBALAMIN) 1000 MCG tablet, Take 1,000 mcg by mouth Daily., Disp: , Rfl:     Review of Systems   Constitutional: Positive for activity change and fatigue. Negative for chills, diaphoresis and fever.   Respiratory: Positive for shortness of breath and wheezing. Negative for cough and chest tightness.   "  Cardiovascular: Positive for chest pain. Negative for palpitations and leg swelling.   Gastrointestinal: Positive for blood in stool and nausea. Negative for abdominal pain, constipation, diarrhea and vomiting.   Skin: Negative for rash.   Neurological: Negative for dizziness, syncope, light-headedness and headache.     /67 (BP Location: Left arm, Patient Position: Sitting, Cuff Size: Adult)   Pulse 71   Temp 97.1 °F (36.2 °C) (Temporal)   Ht 162.6 cm (64\")   Wt 72.6 kg (160 lb)   SpO2 96%   Breastfeeding No   BMI 27.46 kg/m²       Objective   Physical Exam  Vitals and nursing note reviewed.   Constitutional:       Appearance: She is well-groomed and overweight.   HENT:      Head: Normocephalic and atraumatic.   Cardiovascular:      Rate and Rhythm: Rhythm irregularly irregular.      Heart sounds: Normal heart sounds.   Pulmonary:      Effort: Pulmonary effort is normal. No respiratory distress.      Breath sounds: Normal breath sounds. No wheezing, rhonchi or rales.   Abdominal:      General: Abdomen is flat.      Palpations: Abdomen is soft.   Musculoskeletal:      Cervical back: Neck supple.      Right lower leg: No edema.      Left lower leg: Edema (trace) present.   Lymphadenopathy:      Cervical: No cervical adenopathy.   Skin:     Coloration: Skin is not ashen, cyanotic or jaundiced.   Neurological:      Mental Status: She is alert.   Psychiatric:         Behavior: Behavior is cooperative.          CT ABDOMEN PELVIS WO CONTRAST-     Date of Exam: 4/14/2021 10:11 AM     Indication: Abdominal pain, acute, nonlocalized; I48.91-Unspecified  atrial fibrillation; R00.2-Palpitations.     Comparison: None available.     Technique: CT scan of the abdomen and pelvis without IV contrast.  Automated exposure control and iterative reconstruction methods were  used.     FINDINGS:  The lung bases are clear.     The unenhanced liver, gallbladder, adrenal glands, spleen, and pancreas  are unremarkable. There is " a moderate right renal cyst. There is a tiny  nonobstructing left renal stone.     The stomach appears normal. The small bowel appears normal in caliber  and configuration. The colon appears normal. The appendix appears  normal. There is no ascites or loculated collection. No abnormally  enlarged lymph nodes are identified.     The rectum, uterus and adnexa, and urinary bladder are unremarkable.     No aggressive osseous lesions are identified. There are prominent  Schmorl's nodes along the lower thoracic and upper lumbar spine.     IMPRESSION:  1.No acute process identified within abdomen/pelivs.  2.Nonobstructing left renal stone.        Component   Ref Range & Units    Campylobacter   Not Detected Not Detected    Plesiomonas shigelloides   Not Detected Not Detected    Salmonella   Not Detected Not Detected    Vibrio   Not Detected Not Detected    Vibrio cholerae   Not Detected Not Detected    Yersinia enterocolitica   Not Detected Not Detected    Enteroaggregative E. coli (EAEC)   Not Detected Not Detected    Enteropathogenic E. coli (EPEC)   Not Detected Not Detected    Enterotoxigenic E. coli (ETEC) lt/st   Not Detected Not Detected    Shiga-like toxin-producing E. coli (STEC) stx1/stx2   Not Detected Not Detected    Shigella/Enteroinvasive E. coli (EIEC)   Not Detected Not Detected    Cryptosporidium   Not Detected Not Detected    Cyclospora cayetanensis   Not Detected Not Detected    Entamoeba histolytica   Not Detected Not Detected    Giardia lamblia   Not Detected Not Detected    Adenovirus F40/41   Not Detected Not Detected    Astrovirus   Not Detected Not Detected    Norovirus GI/GII   Not Detected DetectedAbnormal     Rotavirus A   Not Detected Not Detected    Sapovirus (I, II, IV or V)   Not Detected Not Detected    Resulting Agency [facility]         Specimen Collected: 04/14/21 09:42 Last Resulted: 04/14/21 11:38          ECG 12 Lead    Date/Time: 4/28/2021 8:54 AM  Performed by: Colton  Chloe CARD MD  Authorized by: Chloe Segura MD   Comparison: compared with previous ECG from 4/15/2021  Comparison to previous ECG: Prev ekg showed sinus rhythm with ischemia  Rhythm: atrial fibrillation  Rate: tachycardic  Conduction: conduction normal    Clinical impression: abnormal EKG  Comments: Atrial fibrillation with mild rapid ventricular response             Electronically Signed By-Carlos Molina MD On:4/14/2021 10:38 AM  This report was finalized on [id number] by  Carlos Molina MD.    Assessment/Plan   Problems Addressed this Visit        Symptoms and Signs    Fatigue    Relevant Orders    CBC & Differential    Basic Metabolic Panel      Other Visit Diagnoses     Shortness of breath    -  Primary    Relevant Orders    CBC & Differential    Basic Metabolic Panel    XR Chest 2 View    Atrial fibrillation, unspecified type (CMS/HCC)        Relevant Orders    CBC & Differential    Basic Metabolic Panel    XR Chest 2 View    ECG 12 Lead    Chest pain, unspecified type        Relevant Orders    CBC & Differential    Basic Metabolic Panel    XR Chest 2 View    ECG 12 Lead    Norovirus          Diagnoses       Codes Comments    Shortness of breath    -  Primary ICD-10-CM: R06.02  ICD-9-CM: 786.05     Atrial fibrillation, unspecified type (CMS/HCC)     ICD-10-CM: I48.91  ICD-9-CM: 427.31     Fatigue, unspecified type     ICD-10-CM: R53.83  ICD-9-CM: 780.79     Chest pain, unspecified type     ICD-10-CM: R07.9  ICD-9-CM: 786.50     Norovirus     ICD-10-CM: A08.11  ICD-9-CM: 008.63         Neurovirus has improved  She was encouraged to continue to push fluids  It looks like she was in sinus rhythm when she was discharged in the hospital but she swears that she was not  She feels certain she was discharged because she was insistent upon going home  She says she is being compliant with her medications despite the fatigue and nausea that they are triggering  She is not wanting to go to the  hospital for admission  I will send her for some stat labs including a CBC and a BMP as well as a stat chest x-ray  She was instructed to avoid caffeine and to continue to increase her fluid intake  She was instructed to go the emergency room if her symptoms increase in severity at any point  She currently has an appointment for follow-up with EP cardiology in the next 2 weeks

## 2021-05-05 LAB — QT INTERVAL: 404 MS

## 2021-05-10 ENCOUNTER — ANESTHESIA EVENT (OUTPATIENT)
Dept: CARDIOLOGY | Facility: HOSPITAL | Age: 66
End: 2021-05-10

## 2021-05-10 ENCOUNTER — PREP FOR SURGERY (OUTPATIENT)
Dept: OTHER | Facility: HOSPITAL | Age: 66
End: 2021-05-10

## 2021-05-10 ENCOUNTER — OFFICE VISIT (OUTPATIENT)
Dept: CARDIOLOGY | Facility: CLINIC | Age: 66
End: 2021-05-10

## 2021-05-10 VITALS
WEIGHT: 160 LBS | HEART RATE: 108 BPM | OXYGEN SATURATION: 97 % | DIASTOLIC BLOOD PRESSURE: 73 MMHG | BODY MASS INDEX: 27.46 KG/M2 | SYSTOLIC BLOOD PRESSURE: 161 MMHG

## 2021-05-10 DIAGNOSIS — R00.2 PALPITATIONS: ICD-10-CM

## 2021-05-10 DIAGNOSIS — A08.4 GASTROENTERITIS AND COLITIS, VIRAL: ICD-10-CM

## 2021-05-10 DIAGNOSIS — I10 ESSENTIAL HYPERTENSION: ICD-10-CM

## 2021-05-10 DIAGNOSIS — I48.92 ATRIAL FLUTTER WITH RAPID VENTRICULAR RESPONSE (HCC): Primary | ICD-10-CM

## 2021-05-10 PROCEDURE — 93000 ELECTROCARDIOGRAM COMPLETE: CPT | Performed by: INTERNAL MEDICINE

## 2021-05-10 PROCEDURE — 99214 OFFICE O/P EST MOD 30 MIN: CPT | Performed by: INTERNAL MEDICINE

## 2021-05-10 RX ORDER — SODIUM CHLORIDE 0.9 % (FLUSH) 0.9 %
3 SYRINGE (ML) INJECTION EVERY 12 HOURS SCHEDULED
Status: CANCELLED | OUTPATIENT
Start: 2021-05-10

## 2021-05-10 RX ORDER — SODIUM CHLORIDE 0.9 % (FLUSH) 0.9 %
10 SYRINGE (ML) INJECTION AS NEEDED
Status: CANCELLED | OUTPATIENT
Start: 2021-05-10

## 2021-05-10 RX ORDER — SODIUM CHLORIDE 0.9 % (FLUSH) 0.9 %
10 SYRINGE (ML) INJECTION EVERY 12 HOURS SCHEDULED
Status: CANCELLED | OUTPATIENT
Start: 2021-05-10

## 2021-05-10 RX ORDER — SODIUM CHLORIDE 9 MG/ML
9 INJECTION, SOLUTION INTRAVENOUS CONTINUOUS PRN
Status: CANCELLED | OUTPATIENT
Start: 2021-05-10

## 2021-05-10 RX ORDER — LIDOCAINE HYDROCHLORIDE 10 MG/ML
0.5 INJECTION, SOLUTION EPIDURAL; INFILTRATION; INTRACAUDAL; PERINEURAL ONCE AS NEEDED
Status: CANCELLED | OUTPATIENT
Start: 2021-05-10

## 2021-05-10 RX ORDER — SODIUM CHLORIDE 0.9 % (FLUSH) 0.9 %
3-10 SYRINGE (ML) INJECTION AS NEEDED
Status: CANCELLED | OUTPATIENT
Start: 2021-05-10

## 2021-05-10 NOTE — PROGRESS NOTES
CC--recurrent atrial flutter post ablation, viral gastroenteritis      Subject  66-year-old female patient had a viral gastroenteritis and uncontrollable atrial flutter and underwent right atrial flutter ablation with termination with early recurrence and placed on the amiodarone and comes in for follow-up.  She had a prior history of hypertension and hyperthyroidism  TSH in the hospital within normal range  Feels fatigued with dyspnea with ongoing arrhythmia  No syncope      Past Medical History:   Diagnosis Date   • Hypertension    • Hyperthyroidism      Past Surgical History:   Procedure Laterality Date   • BACK SURGERY     • CARDIAC ELECTROPHYSIOLOGY PROCEDURE N/A 4/14/2021    Procedure: EP/Ablation;  Surgeon: Ori Tran MD;  Location: [facility] CATH INVASIVE LOCATION;  Service: Cardiovascular;  Laterality: N/A;       Review of Systems   General:  positive for fatigue and tiredness  Eyes: No redness  Cardiovascular: No chest pain, positive for  palpitations  Respiratory:   no shortness of breath        Physical Exam  VITALS REVIEWED    General:      well developed, well nourished, in no acute distress.    Head:      normocephalic and atraumatic.    Eyes:      PERRL/EOM intact, conjunctiva and sclera clear with out nystagmus.    Neck:      no masses, thyromegaly,  trachea central with normal respiratory effort   Lungs:      clear bilaterally to auscultation.    Heart:       underlying atrial flutter  without any murmurs gallops or rubs          Assessment and plan    Counterclockwise right atrial flutter status post complex ablation needing extensive ablation  with recurrence  Continue aspirin  Viral gastroenteritis  Stop multaq  Re schedule for ablation          ECG 12 Lead    Date/Time: 5/10/2021 11:54 AM  Performed by: Ori Tran MD  Authorized by: Ori Tran MD   Comparison: compared with previous ECG   Similar to previous ECG  Rhythm: atrial flutter  Rate: tachycardic  Conduction:  conduction normal  QRS axis: normal            Electronically signed by Ori Tran MD, 05/10/21, 11:55 AM EDT.

## 2021-05-11 ENCOUNTER — LAB (OUTPATIENT)
Dept: LAB | Facility: HOSPITAL | Age: 66
End: 2021-05-11

## 2021-05-11 ENCOUNTER — ANESTHESIA (OUTPATIENT)
Dept: CARDIOLOGY | Facility: HOSPITAL | Age: 66
End: 2021-05-11

## 2021-05-11 ENCOUNTER — TRANSCRIBE ORDERS (OUTPATIENT)
Dept: ADMINISTRATIVE | Facility: HOSPITAL | Age: 66
End: 2021-05-11

## 2021-05-11 ENCOUNTER — HOSPITAL ENCOUNTER (OUTPATIENT)
Facility: HOSPITAL | Age: 66
Discharge: HOME | End: 2021-05-12
Attending: INTERNAL MEDICINE | Admitting: INTERNAL MEDICINE

## 2021-05-11 DIAGNOSIS — I48.92 ATRIAL FLUTTER WITH RAPID VENTRICULAR RESPONSE (HCC): ICD-10-CM

## 2021-05-11 DIAGNOSIS — I10 ESSENTIAL HYPERTENSION: ICD-10-CM

## 2021-05-11 DIAGNOSIS — R00.2 PALPITATIONS: ICD-10-CM

## 2021-05-11 DIAGNOSIS — I48.92 ATRIAL FLUTTER WITH RAPID VENTRICULAR RESPONSE (HCC): Primary | ICD-10-CM

## 2021-05-11 DIAGNOSIS — I20.0 UNSTABLE ANGINA (HCC): Primary | ICD-10-CM

## 2021-05-11 PROBLEM — I48.0 PAROXYSMAL ATRIAL FIBRILLATION: Status: ACTIVE | Noted: 2021-05-11

## 2021-05-11 LAB
ACT BLD: 136 SECONDS (ref 89–137)
ACT BLD: 301 SECONDS (ref 89–137)
ACT BLD: 318 SECONDS (ref 89–137)

## 2021-05-11 PROCEDURE — 85347 COAGULATION TIME ACTIVATED: CPT

## 2021-05-11 PROCEDURE — 25010000002 HEPARIN (PORCINE) PER 1000 UNITS: Performed by: ANESTHESIOLOGY

## 2021-05-11 PROCEDURE — 63710000001 ACETAMINOPHEN 325 MG TABLET: Performed by: INTERNAL MEDICINE

## 2021-05-11 PROCEDURE — 93458 L HRT ARTERY/VENTRICLE ANGIO: CPT | Performed by: INTERNAL MEDICINE

## 2021-05-11 PROCEDURE — C1769 GUIDE WIRE: HCPCS | Performed by: INTERNAL MEDICINE

## 2021-05-11 PROCEDURE — 93005 ELECTROCARDIOGRAM TRACING: CPT | Performed by: ANESTHESIOLOGY

## 2021-05-11 PROCEDURE — 25010000003 LIDOCAINE 1 % SOLUTION: Performed by: INTERNAL MEDICINE

## 2021-05-11 PROCEDURE — 25010000002 MORPHINE PER 10 MG: Performed by: ANESTHESIOLOGY

## 2021-05-11 PROCEDURE — C1733 CATH, EP, OTHR THAN COOL-TIP: HCPCS | Performed by: INTERNAL MEDICINE

## 2021-05-11 PROCEDURE — C1894 INTRO/SHEATH, NON-LASER: HCPCS | Performed by: INTERNAL MEDICINE

## 2021-05-11 PROCEDURE — C1730 CATH, EP, 19 OR FEW ELECT: HCPCS | Performed by: INTERNAL MEDICINE

## 2021-05-11 PROCEDURE — 80053 COMPREHEN METABOLIC PANEL: CPT

## 2021-05-11 PROCEDURE — 93662 INTRACARDIAC ECG (ICE): CPT | Performed by: INTERNAL MEDICINE

## 2021-05-11 PROCEDURE — C1732 CATH, EP, DIAG/ABL, 3D/VECT: HCPCS | Performed by: INTERNAL MEDICINE

## 2021-05-11 PROCEDURE — 25010000002 FENTANYL CITRATE (PF) 100 MCG/2ML SOLUTION: Performed by: INTERNAL MEDICINE

## 2021-05-11 PROCEDURE — 93655 ICAR CATH ABLTJ DSCRT ARRHYT: CPT | Performed by: INTERNAL MEDICINE

## 2021-05-11 PROCEDURE — 25010000002 ONDANSETRON PER 1 MG: Performed by: ANESTHESIOLOGY

## 2021-05-11 PROCEDURE — U0003 INFECTIOUS AGENT DETECTION BY NUCLEIC ACID (DNA OR RNA); SEVERE ACUTE RESPIRATORY SYNDROME CORONAVIRUS 2 (SARS-COV-2) (CORONAVIRUS DISEASE [COVID-19]), AMPLIFIED PROBE TECHNIQUE, MAKING USE OF HIGH THROUGHPUT TECHNOLOGIES AS DESCRIBED BY CMS-2020-01-R: HCPCS

## 2021-05-11 PROCEDURE — 85025 COMPLETE CBC W/AUTO DIFF WBC: CPT

## 2021-05-11 PROCEDURE — 93613 INTRACARDIAC EPHYS 3D MAPG: CPT | Performed by: INTERNAL MEDICINE

## 2021-05-11 PROCEDURE — A9270 NON-COVERED ITEM OR SERVICE: HCPCS | Performed by: INTERNAL MEDICINE

## 2021-05-11 PROCEDURE — 25010000002 MIDAZOLAM PER 1 MG: Performed by: ANESTHESIOLOGY

## 2021-05-11 PROCEDURE — 93656 COMPRE EP EVAL ABLTJ ATR FIB: CPT | Performed by: INTERNAL MEDICINE

## 2021-05-11 PROCEDURE — 25010000002 PROPOFOL 10 MG/ML EMULSION: Performed by: ANESTHESIOLOGY

## 2021-05-11 PROCEDURE — 25010000002 HYDROMORPHONE PER 4 MG: Performed by: ANESTHESIOLOGY

## 2021-05-11 PROCEDURE — C1766 INTRO/SHEATH,STRBLE,NON-PEEL: HCPCS | Performed by: INTERNAL MEDICINE

## 2021-05-11 PROCEDURE — G0378 HOSPITAL OBSERVATION PER HR: HCPCS

## 2021-05-11 PROCEDURE — 25010000002 MIDAZOLAM PER 1 MG: Performed by: INTERNAL MEDICINE

## 2021-05-11 PROCEDURE — 93010 ELECTROCARDIOGRAM REPORT: CPT | Performed by: INTERNAL MEDICINE

## 2021-05-11 PROCEDURE — C1759 CATH, INTRA ECHOCARDIOGRAPHY: HCPCS | Performed by: INTERNAL MEDICINE

## 2021-05-11 PROCEDURE — 36415 COLL VENOUS BLD VENIPUNCTURE: CPT

## 2021-05-11 PROCEDURE — C9803 HOPD COVID-19 SPEC COLLECT: HCPCS

## 2021-05-11 PROCEDURE — C1893 INTRO/SHEATH, FIXED,NON-PEEL: HCPCS | Performed by: INTERNAL MEDICINE

## 2021-05-11 PROCEDURE — 0 IOPAMIDOL PER 1 ML: Performed by: INTERNAL MEDICINE

## 2021-05-11 PROCEDURE — 25010000002 PROTAMINE SULFATE PER 10 MG: Performed by: ANESTHESIOLOGY

## 2021-05-11 PROCEDURE — 83735 ASSAY OF MAGNESIUM: CPT

## 2021-05-11 PROCEDURE — 25010000002 NEOSTIGMINE 10 MG/10ML SOLUTION: Performed by: ANESTHESIOLOGY

## 2021-05-11 PROCEDURE — 99152 MOD SED SAME PHYS/QHP 5/>YRS: CPT | Performed by: INTERNAL MEDICINE

## 2021-05-11 RX ORDER — SODIUM CHLORIDE 9 MG/ML
INJECTION, SOLUTION INTRAVENOUS CONTINUOUS PRN
Status: COMPLETED | OUTPATIENT
Start: 2021-05-11 | End: 2021-05-11

## 2021-05-11 RX ORDER — PROPOFOL 10 MG/ML
VIAL (ML) INTRAVENOUS AS NEEDED
Status: DISCONTINUED | OUTPATIENT
Start: 2021-05-11 | End: 2021-05-11 | Stop reason: SURG

## 2021-05-11 RX ORDER — GLYCOPYRROLATE 1 MG/5 ML
SYRINGE (ML) INTRAVENOUS AS NEEDED
Status: DISCONTINUED | OUTPATIENT
Start: 2021-05-11 | End: 2021-05-11 | Stop reason: SURG

## 2021-05-11 RX ORDER — MIDAZOLAM HYDROCHLORIDE 1 MG/ML
INJECTION INTRAMUSCULAR; INTRAVENOUS AS NEEDED
Status: DISCONTINUED | OUTPATIENT
Start: 2021-05-11 | End: 2021-05-11 | Stop reason: HOSPADM

## 2021-05-11 RX ORDER — HEPARIN SODIUM 1000 [USP'U]/ML
INJECTION, SOLUTION INTRAVENOUS; SUBCUTANEOUS AS NEEDED
Status: DISCONTINUED | OUTPATIENT
Start: 2021-05-11 | End: 2021-05-11 | Stop reason: SURG

## 2021-05-11 RX ORDER — LANOLIN ALCOHOL/MO/W.PET/CERES
1000 CREAM (GRAM) TOPICAL DAILY
Status: DISCONTINUED | OUTPATIENT
Start: 2021-05-11 | End: 2021-05-12 | Stop reason: HOSPADM

## 2021-05-11 RX ORDER — MORPHINE SULFATE 4 MG/ML
1 INJECTION, SOLUTION INTRAMUSCULAR; INTRAVENOUS EVERY 4 HOURS PRN
Status: DISCONTINUED | OUTPATIENT
Start: 2021-05-11 | End: 2021-05-12 | Stop reason: HOSPADM

## 2021-05-11 RX ORDER — MORPHINE SULFATE 4 MG/ML
4 INJECTION, SOLUTION INTRAMUSCULAR; INTRAVENOUS ONCE
Status: COMPLETED | OUTPATIENT
Start: 2021-05-11 | End: 2021-05-11

## 2021-05-11 RX ORDER — NITROGLYCERIN 5 MG/ML
INJECTION, SOLUTION INTRAVENOUS AS NEEDED
Status: DISCONTINUED | OUTPATIENT
Start: 2021-05-11 | End: 2021-05-11 | Stop reason: HOSPADM

## 2021-05-11 RX ORDER — LIDOCAINE HYDROCHLORIDE 20 MG/ML
INJECTION, SOLUTION INFILTRATION; PERINEURAL AS NEEDED
Status: DISCONTINUED | OUTPATIENT
Start: 2021-05-11 | End: 2021-05-11 | Stop reason: HOSPADM

## 2021-05-11 RX ORDER — HYDROMORPHONE HCL 110MG/55ML
0.5 PATIENT CONTROLLED ANALGESIA SYRINGE INTRAVENOUS
Status: DISCONTINUED | OUTPATIENT
Start: 2021-05-11 | End: 2021-05-11 | Stop reason: HOSPADM

## 2021-05-11 RX ORDER — PHENYLEPHRINE HCL IN 0.9% NACL 1 MG/10 ML
SYRINGE (ML) INTRAVENOUS AS NEEDED
Status: DISCONTINUED | OUTPATIENT
Start: 2021-05-11 | End: 2021-05-11 | Stop reason: SURG

## 2021-05-11 RX ORDER — FENTANYL CITRATE 50 UG/ML
INJECTION, SOLUTION INTRAMUSCULAR; INTRAVENOUS AS NEEDED
Status: DISCONTINUED | OUTPATIENT
Start: 2021-05-11 | End: 2021-05-11 | Stop reason: HOSPADM

## 2021-05-11 RX ORDER — ONDANSETRON 2 MG/ML
4 INJECTION INTRAMUSCULAR; INTRAVENOUS ONCE
Status: COMPLETED | OUTPATIENT
Start: 2021-05-11 | End: 2021-05-11

## 2021-05-11 RX ORDER — ROCURONIUM BROMIDE 10 MG/ML
INJECTION, SOLUTION INTRAVENOUS AS NEEDED
Status: DISCONTINUED | OUTPATIENT
Start: 2021-05-11 | End: 2021-05-11 | Stop reason: SURG

## 2021-05-11 RX ORDER — LIDOCAINE HYDROCHLORIDE 10 MG/ML
INJECTION, SOLUTION INFILTRATION; PERINEURAL AS NEEDED
Status: DISCONTINUED | OUTPATIENT
Start: 2021-05-11 | End: 2021-05-11 | Stop reason: HOSPADM

## 2021-05-11 RX ORDER — ONDANSETRON 2 MG/ML
4 INJECTION INTRAMUSCULAR; INTRAVENOUS EVERY 6 HOURS PRN
Status: DISCONTINUED | OUTPATIENT
Start: 2021-05-11 | End: 2021-05-12 | Stop reason: HOSPADM

## 2021-05-11 RX ORDER — SODIUM CHLORIDE 9 MG/ML
INJECTION, SOLUTION INTRAVENOUS CONTINUOUS PRN
Status: DISCONTINUED | OUTPATIENT
Start: 2021-05-11 | End: 2021-05-11 | Stop reason: SURG

## 2021-05-11 RX ORDER — NITROGLYCERIN 20 MG/100ML
10-50 INJECTION INTRAVENOUS
Status: DISCONTINUED | OUTPATIENT
Start: 2021-05-11 | End: 2021-05-12 | Stop reason: HOSPADM

## 2021-05-11 RX ORDER — PROTAMINE SULFATE 10 MG/ML
INJECTION, SOLUTION INTRAVENOUS AS NEEDED
Status: DISCONTINUED | OUTPATIENT
Start: 2021-05-11 | End: 2021-05-11 | Stop reason: SURG

## 2021-05-11 RX ORDER — FLUOXETINE HYDROCHLORIDE 20 MG/1
40 CAPSULE ORAL DAILY
Status: DISCONTINUED | OUTPATIENT
Start: 2021-05-11 | End: 2021-05-12 | Stop reason: HOSPADM

## 2021-05-11 RX ORDER — ACETAMINOPHEN 325 MG/1
650 TABLET ORAL EVERY 4 HOURS PRN
Status: DISCONTINUED | OUTPATIENT
Start: 2021-05-11 | End: 2021-05-12 | Stop reason: HOSPADM

## 2021-05-11 RX ORDER — EPHEDRINE SULFATE 50 MG/ML
INJECTION INTRAVENOUS AS NEEDED
Status: DISCONTINUED | OUTPATIENT
Start: 2021-05-11 | End: 2021-05-11 | Stop reason: SURG

## 2021-05-11 RX ORDER — NEOSTIGMINE METHYLSULFATE 1 MG/ML
INJECTION, SOLUTION INTRAVENOUS AS NEEDED
Status: DISCONTINUED | OUTPATIENT
Start: 2021-05-11 | End: 2021-05-11 | Stop reason: SURG

## 2021-05-11 RX ORDER — NITROGLYCERIN 0.4 MG/1
0.4 TABLET SUBLINGUAL
Status: DISCONTINUED | OUTPATIENT
Start: 2021-05-11 | End: 2021-05-12 | Stop reason: HOSPADM

## 2021-05-11 RX ORDER — MIDAZOLAM HYDROCHLORIDE 1 MG/ML
INJECTION INTRAMUSCULAR; INTRAVENOUS AS NEEDED
Status: DISCONTINUED | OUTPATIENT
Start: 2021-05-11 | End: 2021-05-11 | Stop reason: SURG

## 2021-05-11 RX ORDER — ACETAMINOPHEN 650 MG/1
650 SUPPOSITORY RECTAL EVERY 4 HOURS PRN
Status: DISCONTINUED | OUTPATIENT
Start: 2021-05-11 | End: 2021-05-12 | Stop reason: HOSPADM

## 2021-05-11 RX ORDER — METOPROLOL TARTRATE 5 MG/5ML
5 INJECTION INTRAVENOUS ONCE
Status: COMPLETED | OUTPATIENT
Start: 2021-05-11 | End: 2021-05-11

## 2021-05-11 RX ORDER — NALOXONE HCL 0.4 MG/ML
0.4 VIAL (ML) INJECTION
Status: DISCONTINUED | OUTPATIENT
Start: 2021-05-11 | End: 2021-05-12 | Stop reason: HOSPADM

## 2021-05-11 RX ORDER — METOPROLOL SUCCINATE 25 MG/1
25 TABLET, EXTENDED RELEASE ORAL
Status: DISCONTINUED | OUTPATIENT
Start: 2021-05-11 | End: 2021-05-12 | Stop reason: HOSPADM

## 2021-05-11 RX ORDER — HYDROCODONE BITARTRATE AND ACETAMINOPHEN 5; 325 MG/1; MG/1
1 TABLET ORAL EVERY 4 HOURS PRN
Status: DISCONTINUED | OUTPATIENT
Start: 2021-05-11 | End: 2021-05-12 | Stop reason: HOSPADM

## 2021-05-11 RX ADMIN — PROTAMINE SULFATE 100 MG: 10 INJECTION, SOLUTION INTRAVENOUS at 17:31

## 2021-05-11 RX ADMIN — HYDROMORPHONE HYDROCHLORIDE 0.5 MG: 2 INJECTION, SOLUTION INTRAMUSCULAR; INTRAVENOUS; SUBCUTANEOUS at 18:45

## 2021-05-11 RX ADMIN — EPHEDRINE SULFATE 10 MG: 50 INJECTION INTRAVENOUS at 15:54

## 2021-05-11 RX ADMIN — PROPOFOL 100 MCG/KG/MIN: 10 INJECTION, EMULSION INTRAVENOUS at 15:09

## 2021-05-11 RX ADMIN — HEPARIN SODIUM 9000 UNITS: 1000 INJECTION, SOLUTION INTRAVENOUS; SUBCUTANEOUS at 15:50

## 2021-05-11 RX ADMIN — Medication 0.5 MG: at 16:05

## 2021-05-11 RX ADMIN — NITROGLYCERIN 0.4 MG: 0.4 TABLET SUBLINGUAL at 13:43

## 2021-05-11 RX ADMIN — MIDAZOLAM 2 MG: 1 INJECTION INTRAMUSCULAR; INTRAVENOUS at 15:07

## 2021-05-11 RX ADMIN — HEPARIN SODIUM 1000 UNITS: 1000 INJECTION, SOLUTION INTRAVENOUS; SUBCUTANEOUS at 16:05

## 2021-05-11 RX ADMIN — ROCURONIUM BROMIDE 40 MG: 10 INJECTION INTRAVENOUS at 15:38

## 2021-05-11 RX ADMIN — FAMOTIDINE 20 MG: 10 INJECTION INTRAVENOUS at 15:47

## 2021-05-11 RX ADMIN — SODIUM CHLORIDE: 0.9 INJECTION, SOLUTION INTRAVENOUS at 15:04

## 2021-05-11 RX ADMIN — HYDROMORPHONE HYDROCHLORIDE 0.5 MG: 2 INJECTION, SOLUTION INTRAMUSCULAR; INTRAVENOUS; SUBCUTANEOUS at 19:00

## 2021-05-11 RX ADMIN — Medication 100 MCG: at 17:32

## 2021-05-11 RX ADMIN — NITROGLYCERIN 10 MCG/MIN: 20 INJECTION INTRAVENOUS at 13:55

## 2021-05-11 RX ADMIN — ONDANSETRON 4 MG: 2 INJECTION INTRAMUSCULAR; INTRAVENOUS at 18:27

## 2021-05-11 RX ADMIN — HEPARIN SODIUM 5000 UNITS: 1000 INJECTION, SOLUTION INTRAVENOUS; SUBCUTANEOUS at 15:46

## 2021-05-11 RX ADMIN — HEPARIN SODIUM 2000 UNITS: 1000 INJECTION, SOLUTION INTRAVENOUS; SUBCUTANEOUS at 16:27

## 2021-05-11 RX ADMIN — ACETAMINOPHEN 650 MG: 325 TABLET, FILM COATED ORAL at 23:26

## 2021-05-11 RX ADMIN — METOPROLOL TARTRATE 5 MG: 5 INJECTION INTRAVENOUS at 14:10

## 2021-05-11 RX ADMIN — MORPHINE SULFATE 4 MG: 4 INJECTION INTRAVENOUS at 18:24

## 2021-05-11 RX ADMIN — SODIUM CHLORIDE: 0.9 INJECTION, SOLUTION INTRAVENOUS at 16:08

## 2021-05-11 RX ADMIN — PROPOFOL 150 MG: 10 INJECTION, EMULSION INTRAVENOUS at 15:39

## 2021-05-11 RX ADMIN — SUGAMMADEX 200 MG: 100 INJECTION, SOLUTION INTRAVENOUS at 16:15

## 2021-05-11 RX ADMIN — Medication 50 MCG: at 16:38

## 2021-05-11 RX ADMIN — NEOSTIGMINE METHYLSULFATE 4 MG: 1 INJECTION, SOLUTION INTRAVENOUS at 16:05

## 2021-05-11 NOTE — ANESTHESIA PROCEDURE NOTES
Airway  Airway not difficult    General Information and Staff    Anesthesiologist: Romero Farooq MD    Indications and Patient Condition  Indications for airway management: airway protection and CNS depression    Preoxygenated: yes  Mask difficulty assessment: 2 - vent by mask + OA or adjuvant +/- NMBA    Final Airway Details  Final airway type: endotracheal airway      Successful airway: ETT  Cuffed: yes   Successful intubation technique: direct laryngoscopy  Facilitating devices/methods: cricoid pressure  Endotracheal tube insertion site: oral  Blade: Gianna  Blade size: 3  ETT size (mm): 7.0  Cormack-Lehane Classification: grade IIa - partial view of glottis  Placement verified by: capnometry   Measured from: teeth  ETT/EBT  to teeth (cm): 21  Number of attempts at approach: 1  Assessment: lips, teeth, and gum same as pre-op and atraumatic intubation

## 2021-05-11 NOTE — ANESTHESIA POSTPROCEDURE EVALUATION
Patient: Katarina Phillips    Procedure Summary     Date: 05/11/21 Room / Location: [place] CATH LAB 3 / [facility] CATH INVASIVE LOCATION    Anesthesia Start: 1502 Anesthesia Stop:     Procedure: EP/Ablation (N/A ) Diagnosis:       Atrial flutter with rapid ventricular response (CMS/HCC)      Palpitations      Essential hypertension      (atrial flutter)    Providers: Ori Tran MD Provider: Mahesh Almeida MD    Anesthesia Type: MAC ASA Status: 3          Anesthesia Type: MAC    Vitals  No vitals data found for the desired time range.          Post Anesthesia Care and Evaluation    Patient location during evaluation: PACU  Patient participation: complete - patient cannot participate  Level of consciousness: responsive to light touch and responsive to physical stimuli  Pain score: 0  Pain management: adequate  Airway patency: patent  Anesthetic complications: No anesthetic complications  PONV Status: controlled  Cardiovascular status: acceptable and hemodynamically stable  Respiratory status: acceptable, face mask and oral airway  Hydration status: acceptable    Comments: Satisfactory progress.Patient seen and examined postoperatively; vital signs stable; SpO2 greater than or equal to 90%; cardiopulmonary status stable; nausea/vomiting adequately controlled; pain adequately controlled; no apparent anesthesia complications; patient discharged from anesthesia care when discharge criteria were met

## 2021-05-11 NOTE — ANESTHESIA PREPROCEDURE EVALUATION
Anesthesia Evaluation     Patient summary reviewed and Nursing notes reviewed   NPO Solid Status: > 8 hours  NPO Liquid Status: > 8 hours           Airway   Mallampati: II  TM distance: >3 FB  Neck ROM: full  No difficulty expected  Dental - normal exam     Pulmonary - normal exam   Cardiovascular     ECG reviewed  Rhythm: irregular    (+) hypertension, dysrhythmias Atrial Fib, hyperlipidemia,       Neuro/Psych  (+) tremors, numbness, psychiatric history,     GI/Hepatic/Renal/Endo    (+)  GERD,      Musculoskeletal     (+) neck pain,   Abdominal  - normal exam    Bowel sounds: normal.   Substance History      OB/GYN          Other        ROS/Med Hx Other: Af     · Left ventricular systolic function is normal.  · Left ventricular ejection fraction is 55%  · Left ventricular wall thickness is consistent with moderate concentric hypertrophy.  · Left ventricular diastolic function was normal.  · The right atrial cavity is borderline dilated.  · Calculated right ventricular systolic                   Anesthesia Plan    ASA 3     MAC     intravenous induction     Anesthetic plan, all risks, benefits, and alternatives have been provided, discussed and informed consent has been obtained with: patient.    Plan discussed with CRNA.

## 2021-05-12 ENCOUNTER — READMISSION MANAGEMENT (OUTPATIENT)
Dept: CALL CENTER | Facility: HOSPITAL | Age: 66
End: 2021-05-12

## 2021-05-12 ENCOUNTER — ANESTHESIA (OUTPATIENT)
Dept: ANESTHESIOLOGY | Facility: HOSPITAL | Age: 66
End: 2021-05-12

## 2021-05-12 ENCOUNTER — ANESTHESIA EVENT (OUTPATIENT)
Dept: ANESTHESIOLOGY | Facility: HOSPITAL | Age: 66
End: 2021-05-12

## 2021-05-12 VITALS
WEIGHT: 161.16 LBS | HEART RATE: 81 BPM | DIASTOLIC BLOOD PRESSURE: 54 MMHG | OXYGEN SATURATION: 96 % | BODY MASS INDEX: 28.55 KG/M2 | RESPIRATION RATE: 10 BRPM | HEIGHT: 63 IN | SYSTOLIC BLOOD PRESSURE: 142 MMHG | TEMPERATURE: 97.5 F

## 2021-05-12 LAB
ACT BLD: 114 SECONDS (ref 89–137)
QT INTERVAL: 444 MS

## 2021-05-12 PROCEDURE — A9270 NON-COVERED ITEM OR SERVICE: HCPCS | Performed by: INTERNAL MEDICINE

## 2021-05-12 PROCEDURE — G0378 HOSPITAL OBSERVATION PER HR: HCPCS

## 2021-05-12 PROCEDURE — 63710000001 METOPROLOL SUCCINATE XL 25 MG TABLET SUSTAINED-RELEASE 24 HOUR: Performed by: INTERNAL MEDICINE

## 2021-05-12 PROCEDURE — 99217 PR OBSERVATION CARE DISCHARGE MANAGEMENT: CPT | Performed by: NURSE PRACTITIONER

## 2021-05-12 PROCEDURE — 63710000001 VITAMIN B-12 1000 MCG TABLET: Performed by: INTERNAL MEDICINE

## 2021-05-12 PROCEDURE — 63710000001 APIXABAN 5 MG TABLET: Performed by: INTERNAL MEDICINE

## 2021-05-12 PROCEDURE — 63710000001 FLUOXETINE 20 MG CAPSULE: Performed by: INTERNAL MEDICINE

## 2021-05-12 PROCEDURE — 63710000001 NITROGLYCERIN 0.4 MG SUBLINGUAL TABLET 25 EACH BOTTLE: Performed by: INTERNAL MEDICINE

## 2021-05-12 PROCEDURE — 93005 ELECTROCARDIOGRAM TRACING: CPT | Performed by: INTERNAL MEDICINE

## 2021-05-12 RX ORDER — NITROGLYCERIN 0.4 MG/1
0.4 TABLET SUBLINGUAL
Qty: 25 TABLET | Refills: 12 | Status: SHIPPED | OUTPATIENT
Start: 2021-05-12 | End: 2021-05-21

## 2021-05-12 RX ORDER — NITROGLYCERIN 0.4 MG/1
0.4 TABLET SUBLINGUAL
Refills: 12 | COMMUNITY
Start: 2021-05-12 | End: 2021-05-12

## 2021-05-12 RX ADMIN — NITROGLYCERIN 0.4 MG: 0.4 TABLET SUBLINGUAL at 10:12

## 2021-05-12 RX ADMIN — APIXABAN 5 MG: 5 TABLET, FILM COATED ORAL at 07:54

## 2021-05-12 RX ADMIN — FLUOXETINE 40 MG: 20 CAPSULE ORAL at 07:55

## 2021-05-12 RX ADMIN — METOPROLOL SUCCINATE 25 MG: 25 TABLET, EXTENDED RELEASE ORAL at 07:54

## 2021-05-12 RX ADMIN — CYANOCOBALAMIN TAB 1000 MCG 1000 MCG: 1000 TAB at 07:53

## 2021-05-12 NOTE — OUTREACH NOTE
Prep Survey      Responses   [facility] patient discharged from?  [place]   Is LACE score < 7 ?  Yes   Emergency Room discharge w/ pulse ox?  No   Eligibility  [facility]   Date of Admission  05/11/21   Date of Discharge  05/12/21   Discharge Disposition  Home or Self Care   Discharge diagnosis  Viral gastroenteritis,    Recurrent atrial flutter post ablation   Does the patient have one of the following disease processes/diagnoses(primary or secondary)?  Other   Does the patient have Home health ordered?  No   Is there a DME ordered?  No   Prep survey completed?  Yes          Phoebe Cruz RN

## 2021-05-13 ENCOUNTER — TRANSITIONAL CARE MANAGEMENT TELEPHONE ENCOUNTER (OUTPATIENT)
Dept: CALL CENTER | Facility: HOSPITAL | Age: 66
End: 2021-05-13

## 2021-05-13 ENCOUNTER — TELEPHONE (OUTPATIENT)
Dept: CARDIOLOGY | Facility: CLINIC | Age: 66
End: 2021-05-13

## 2021-05-13 LAB — QT INTERVAL: 451 MS

## 2021-05-13 NOTE — OUTREACH NOTE
Call Center TCM Note      Responses   [facility] patient discharged from?  [facility]   Does the patient have one of the following disease processes/diagnoses(primary or secondary)?  Other   TCM attempt successful?  No   Unsuccessful attempts  Attempt 1          Annie Coe LPN    5/13/2021, 13:28 EDT

## 2021-05-13 NOTE — TELEPHONE ENCOUNTER
Spoke with pt she will stop taking the medications and call with any other questions or concerns.

## 2021-05-13 NOTE — TELEPHONE ENCOUNTER
----- Message from Ori ALCANTARA MD sent at 5/12/2021  3:38 PM EDT -----  I stopped all those medications    Dr WAGGONER  ----- Message -----  From: Tiffany Villasenor MA  Sent: 5/12/2021  12:05 PM EDT  To: Ori ALCANTARA MD    Pt called and before she was hospitalized Dr Segura put her on furosemide, potassium, and aspirin 81mg. Pt is wanting to know if you would like her to continue this medication.

## 2021-05-13 NOTE — OUTREACH NOTE
Call Center TCM Note      Responses   [facility] patient discharged from?  [facility]   Does the patient have one of the following disease processes/diagnoses(primary or secondary)?  Other   TCM attempt successful?  Yes   Call start time  1708   Call end time  1720   Discharge diagnosis  Viral gastroenteritis,    Recurrent atrial flutter post ablation   Meds reviewed with patient/caregiver?  Yes   Is the patient having any side effects they believe may be caused by any medication additions or changes?  Yes   Side effects comments   PATIENT HAS HAD TO TAKE NITROGLYCERIN TWICE TODAY DUE TO CHEST PAIN. PATIENT IS UNDER HIGH LEVELS OF STRESS REGARDING GUARDIANSHIP OF HER GREAT GRANDDAUGHTER. PATIENT UNAWARE NITRO CAUSES HEADACHES, BUT THIS NURSE EXPLAINED. PATIENT ASKED IF SHE CAN TAKE ASPIRIN, AND THIS NURSE INFORMED HER NOT TO TAKE ASA DUE TO BEING ON ELIQUIS. PATIENT ENCOURAGED TO TAKE TYLENOL.    Does the patient have all medications ordered at discharge?  Yes   Prescription comments  PATIENT INQUIRED IF THERE IS A MILD ANXIETY MEDICATION SHE CAN TAKE. PATIENT INFORMED THIS NOTE WILL BE ROUTED TO DR. OLIVA'S OFFICE.   Is the patient taking all medications as directed (includes completed medication regime)?  Yes   Does the patient have a primary care provider?   Yes   Does the patient have an appointment with their PCP within 7 days of discharge?  Yes   Comments regarding PCP  PCP APPOINTMENT 5/18/21   Has the patient kept scheduled appointments due by today?  N/A   Has home health visited the patient within 72 hours of discharge?  N/A   Psychosocial issues?  Yes   Nursing interventions  Notified PCP/Provider   Psychosocial comments  PATIENT UNDER A LOT OF STRESS AND HAS HAD TO TAKE NITROGLYCERIN TWICE TODAY. PATIENT WAS IN MEDIATION FOR 7 HOURS TODAY REGARDING GUARDIANSHIP OF HER GREAT GRANDDAUGHTER. SHE HAS GUARDIANSHIP AND ANOTHER UNFIT GRANDPARENT IS TRYING TO GET RIGHTS TO THE CHILD. PATIENT VOICES THIS  IS CAUSING HEART RACING, CHEST PAIN AND ANXIETY.    Did the patient receive a copy of their discharge instructions?  Yes   Nursing interventions  Reviewed instructions with patient   What is the patient's perception of their health status since discharge?  Same   Is the patient/caregiver able to teach back signs and symptoms related to disease process for when to call PCP?  Yes   Is the patient/caregiver able to teach back signs and symptoms related to disease process for when to call 911?  Yes   Is the patient/caregiver able to teach back the hierarchy of who to call/visit for symptoms/problems? PCP, Specialist, Home health nurse, Urgent Care, ED, 911  Yes   If the patient is a current smoker, are they able to teach back resources for cessation?  Not a smoker   TCM call completed?  Yes   Wrap up additional comments  PATIENT VERY OVERWHELMED AND STRESSED REGARDING BEING IN MEDIATION 7 HOURS TODAY. PATIENT HAS GUARDIANSHIP OF A 4 YEAR OLD GRANDAUGHTER AND AN UNFIT GRANDPARENT IS TRYING TO GET RIGHTS TO THIS CHILD. PATIENT ALLOWED TO VENT AND EXPRESS ANXIOUS FEELINGS DURING THIS CALLED. NURSE PROVIDED REASSURANCE AND INFORMED HER THIS CALL WILL BE ROUTED TO DR. OLIVA.           Annie Coe LPN    5/13/2021, 17:28 EDT

## 2021-05-14 RX ORDER — BUSPIRONE HYDROCHLORIDE 10 MG/1
10 TABLET ORAL 3 TIMES DAILY
Qty: 90 TABLET | Refills: 1 | Status: SHIPPED | OUTPATIENT
Start: 2021-05-14 | End: 2021-06-02

## 2021-05-18 ENCOUNTER — OFFICE VISIT (OUTPATIENT)
Dept: FAMILY MEDICINE CLINIC | Facility: CLINIC | Age: 66
End: 2021-05-18

## 2021-05-18 VITALS
HEART RATE: 71 BPM | WEIGHT: 163 LBS | SYSTOLIC BLOOD PRESSURE: 160 MMHG | TEMPERATURE: 97.8 F | DIASTOLIC BLOOD PRESSURE: 81 MMHG | HEIGHT: 63 IN | OXYGEN SATURATION: 98 % | BODY MASS INDEX: 28.88 KG/M2

## 2021-05-18 DIAGNOSIS — R00.2 PALPITATIONS: ICD-10-CM

## 2021-05-18 DIAGNOSIS — R53.83 FATIGUE, UNSPECIFIED TYPE: ICD-10-CM

## 2021-05-18 DIAGNOSIS — F41.9 ANXIETY: ICD-10-CM

## 2021-05-18 DIAGNOSIS — I48.92 ATRIAL FLUTTER WITH RAPID VENTRICULAR RESPONSE (HCC): Primary | ICD-10-CM

## 2021-05-18 DIAGNOSIS — I48.0 PAROXYSMAL ATRIAL FIBRILLATION (HCC): ICD-10-CM

## 2021-05-18 PROCEDURE — 1111F DSCHRG MED/CURRENT MED MERGE: CPT | Performed by: FAMILY MEDICINE

## 2021-05-18 PROCEDURE — 99496 TRANSJ CARE MGMT HIGH F2F 7D: CPT | Performed by: FAMILY MEDICINE

## 2021-05-18 RX ORDER — FLUOXETINE HYDROCHLORIDE 20 MG/1
60 CAPSULE ORAL DAILY
Qty: 270 CAPSULE | Refills: 3
Start: 2021-05-18 | End: 2021-05-30

## 2021-05-18 RX ORDER — LORAZEPAM 0.5 MG/1
0.5 TABLET ORAL 2 TIMES DAILY PRN
Qty: 30 TABLET | Refills: 0 | Status: SHIPPED | OUTPATIENT
Start: 2021-05-18 | End: 2021-10-04

## 2021-05-18 NOTE — PROGRESS NOTES
Transitional Care Follow Up Visit  Subjective     Katarina Phillips is a 66 y.o. female who presents for a transitional care management visit.    Within 48 business hours after discharge our office contacted her via telephone to coordinate her care and needs.      I reviewed and discussed the details of that call along with the discharge summary, hospital problems, inpatient lab results, inpatient diagnostic studies, and consultation reports with Katarina.     Current outpatient and discharge medications have been reconciled for the patient.  Reviewed by: Chloe Segura MD      Date of TCM Phone Call 5/12/2021   [facility]   Date of Admission 5/11/2021   Date of Discharge 5/12/2021   Discharge Disposition Home or Self Care     Risk for Readmission (LACE) Score: 2 (5/12/2021  6:01 AM)      Still having some left sided cp and flutters  Taking nitro 1-2 times per day  4/10  occ SOA  Very tired  Taking naps  Thinks the buspar is making her irritable       Course During Hospital Stay:  Patient was given sublingual nitroglycerin with partial relief of symptoms.  She went from sinus rhythm to rapid atrial flutter/fibrillation.  Nitroglycerin drip was started with some improvement in symptoms.  EP discussed case with interventional cardiologist-Dr. Berg.  Clinical picture concerning for unstable angina.  Patient was given IV Lopressor 5 mg.  She was taken to the cardiac Cath Lab with nonobstructive disease of the LAD, OM1 branch of LCx.  Preserved LV systolic function.  No PCI performed.  She was then taken to EP lab per Dr. Tran for failed medical therapy of symptomatic paroxysmal atrial fibrillation/flutter for RF ablation.  The following portions of the patient's history were reviewed and updated as appropriate: allergies, current medications, past family history, past medical history, past social history, past surgical history and problem list.    Review of Systems    Constitutional: Positive for fatigue. Negative for chills, diaphoresis and fever.   Respiratory: Negative.    Cardiovascular: Positive for chest pain and palpitations. Negative for leg swelling.   Gastrointestinal: Negative for nausea and vomiting.   Neurological: Negative for dizziness, syncope, light-headedness and headaches.   Psychiatric/Behavioral: The patient is nervous/anxious.        Objective   Physical Exam  Vitals and nursing note reviewed.   Constitutional:       General: She is not in acute distress.     Appearance: She is well-developed and overweight.   HENT:      Head: Normocephalic and atraumatic.   Neck:      Thyroid: No thyromegaly.   Cardiovascular:      Rate and Rhythm: Normal rate and regular rhythm.      Heart sounds: Normal heart sounds. No murmur heard.   No friction rub. No gallop.    Pulmonary:      Effort: Pulmonary effort is normal. No respiratory distress.      Breath sounds: Normal breath sounds. No wheezing or rales.   Musculoskeletal:      Cervical back: Neck supple.      Right lower leg: No edema.      Left lower leg: No edema.   Lymphadenopathy:      Cervical: No cervical adenopathy.   Skin:     General: Skin is warm and dry.      Coloration: Skin is not cyanotic or jaundiced.   Neurological:      Mental Status: She is alert.   Psychiatric:         Attention and Perception: Attention normal.         Mood and Affect: Mood is anxious.         Behavior: Behavior is cooperative.       Lab Results   Component Value Date    TSH 2.660 01/22/2021     Lab Results   Component Value Date    WBC 10.20 05/11/2021    HGB 14.5 05/11/2021    HCT 43.6 05/11/2021    MCV 86.2 05/11/2021     05/11/2021     Lab Results   Component Value Date    GLUCOSE 95 05/11/2021    BUN 14 05/11/2021    CREATININE 0.73 05/11/2021    EGFRIFNONA 80 05/11/2021    BCR 19.2 05/11/2021    K 4.6 05/11/2021    CO2 26.0 05/11/2021    CALCIUM 9.4 05/11/2021    ALBUMIN 4.10 05/11/2021    LABIL2 1.6 05/07/2018    AST  21 05/11/2021    ALT 32 05/11/2021         Assessment/Plan   Problems Addressed this Visit        Cardiac and Vasculature    Palpitations    Atrial flutter with rapid ventricular response (CMS/HCC) - Primary    Paroxysmal atrial fibrillation (CMS/HCC)       Mental Health    Anxiety    Relevant Medications    LORazepam (Ativan) 0.5 MG tablet       Symptoms and Signs    Fatigue      Diagnoses       Codes Comments    Atrial flutter with rapid ventricular response (CMS/HCC)    -  Primary ICD-10-CM: I48.92  ICD-9-CM: 427.32     Anxiety     ICD-10-CM: F41.9  ICD-9-CM: 300.00     Palpitations     ICD-10-CM: R00.2  ICD-9-CM: 785.1     Paroxysmal atrial fibrillation (CMS/HCC)     ICD-10-CM: I48.0  ICD-9-CM: 427.31     Fatigue, unspecified type     ICD-10-CM: R53.83  ICD-9-CM: 780.79         Overall she is doing fairly well   She has follow-up with cardiology already scheduled still needs to schedule follow-up with her EP cardiologist  Anxiety is still high related to her illness and to the care of her great granddaughter and the pending adoption  I have reassured her about her fatigue and reviewed recent labs  I suspect is a side effect of the medication  I will stop the BuSpar since she is having irritability and will up the dose of her fluoxetine to 60 mg a day and will also give her a few as needed Ativan 0.5 mg  I have counseled her on the addictive potential and the importance of only taking it when she absolutely needs it  I will see her back in 6 months for follow-up Medicare wellness

## 2021-05-19 ENCOUNTER — TELEPHONE (OUTPATIENT)
Dept: CARDIOLOGY | Facility: CLINIC | Age: 66
End: 2021-05-19

## 2021-05-19 NOTE — TELEPHONE ENCOUNTER
----- Message from Ori ALCANTARA MD sent at 5/19/2021  3:38 PM EDT -----  She should not be taking nitro--need to see her   bring to office    Dr WAGGONER  ----- Message -----  From: Tiffany Villasenor MA  Sent: 5/19/2021   2:11 PM EDT  To: Ori ALCANTARA MD    Pt called and she has been taking the nirto at least once or twice daily since 5/12/21. She states she is still experiencing the chest pain that radiates to her arm, and back. She would like to know if she shuld continue to keep taking the nitro. Or if there is anything else she needs to do to help with it

## 2021-05-19 NOTE — TELEPHONE ENCOUNTER
Pt called the office and left a voice mail stating she has been taking her nitro several times daily do to heart pain that goes in to her arm. I attempted to reach pt at her mobile and home phone. I left multiple voice mails requesting return call.

## 2021-05-21 ENCOUNTER — OFFICE VISIT (OUTPATIENT)
Dept: CARDIOLOGY | Facility: CLINIC | Age: 66
End: 2021-05-21

## 2021-05-21 VITALS
SYSTOLIC BLOOD PRESSURE: 162 MMHG | OXYGEN SATURATION: 96 % | BODY MASS INDEX: 28.71 KG/M2 | DIASTOLIC BLOOD PRESSURE: 89 MMHG | HEART RATE: 61 BPM | WEIGHT: 162 LBS

## 2021-05-21 DIAGNOSIS — Z86.79 STATUS POST ABLATION OF ATRIAL FIBRILLATION: ICD-10-CM

## 2021-05-21 DIAGNOSIS — R00.2 PALPITATIONS: ICD-10-CM

## 2021-05-21 DIAGNOSIS — Z98.890 STATUS POST ABLATION OF ATRIAL FIBRILLATION: ICD-10-CM

## 2021-05-21 DIAGNOSIS — I48.91 NEW ONSET A-FIB (HCC): ICD-10-CM

## 2021-05-21 DIAGNOSIS — I10 ESSENTIAL HYPERTENSION: ICD-10-CM

## 2021-05-21 DIAGNOSIS — I48.92 ATRIAL FLUTTER WITH RAPID VENTRICULAR RESPONSE (HCC): Primary | ICD-10-CM

## 2021-05-21 PROCEDURE — 99214 OFFICE O/P EST MOD 30 MIN: CPT | Performed by: INTERNAL MEDICINE

## 2021-05-21 PROCEDURE — 93000 ELECTROCARDIOGRAM COMPLETE: CPT | Performed by: INTERNAL MEDICINE

## 2021-05-21 RX ORDER — AMLODIPINE BESYLATE 5 MG/1
5 TABLET ORAL DAILY
Qty: 90 TABLET | Refills: 3 | Status: SHIPPED | OUTPATIENT
Start: 2021-05-21 | End: 2021-07-02 | Stop reason: SDUPTHER

## 2021-05-21 NOTE — PROGRESS NOTES
CC--recurrent atrial flutter post ablation, viral gastroenteritis      Subject  66-year-old female patient had a viral gastroenteritis and uncontrollable atrial flutter and underwent right atrial flutter ablation with termination with early recurrence and placed on the amiodarone .She had a prior history of hypertension and hyperthyroidism  TSH in the hospital within normal range  No syncope  Patient has recurrent arrhythmia and complains of atypical pain and underwent cardiac catheterization which showed no significant obstructive artery disease and subsequently underwent pulmonary vein isolation and redo right atrial flutter ablation and comes in for follow-up and complains of chronic left arm pain radiating around to her left breast area      Past Medical History:   Diagnosis Date   • Hypertension    • Hyperthyroidism      Past Surgical History:   Procedure Laterality Date   • BACK SURGERY     • CARDIAC ELECTROPHYSIOLOGY PROCEDURE N/A 4/14/2021    Procedure: EP/Ablation;  Surgeon: Ori Tran MD;  Location: [facility] INVASIVE LOCATION;  Service: Cardiovascular;  Laterality: N/A;       Review of Systems   General:  positive for fatigue and tiredness  Eyes: No redness  Cardiovascular: No chest pain,  palpitations  Respiratory:   no shortness of breath        Physical Exam  VITALS REVIEWED    General:      well developed, well nourished, in no acute distress.    Head:      normocephalic and atraumatic.    Eyes:      PERRL/EOM intact, conjunctiva and sclera clear with out nystagmus.    Neck:      no masses, thyromegaly,  trachea central with normal respiratory effort   Lungs:      clear bilaterally to auscultation.    Heart:       Rhythm without murmurs or gallops        Assessment and plan    Counterclockwise right atrial flutter status post complex ablation needing extensive ablation  with recurrence--associated with atrial fibrillation driving atrial flutter status post pulmonary vein isolation and redo  right atrial flutter ablation with resolution of symptoms of arrhythmias  Atypical chest pain presentation with cardiac catheterization showing without coronary artery stenosis  Atypical left arm pain highly consistent with musculoskeletal symptoms worse on abduction and patient reassured  Hypertension not well controlled  Add amlodipine to optimize hypertension  Gastroenteritis resolved  Patient needs cataract surgery which can be safely done and patient was reassured  Follow-up appointments made      ECG 12 Lead    Date/Time: 5/21/2021 1:29 PM  Performed by: Ori Tran MD  Authorized by: Ori Tran MD   Comparison: compared with previous ECG   Similar to previous ECG  Rhythm: sinus rhythm  Rate: normal  Conduction: conduction normal  QRS axis: normal            Electronically signed by Ori Tran MD, 05/21/21, 1:28 PM EDT.

## 2021-05-30 RX ORDER — FLUOXETINE HYDROCHLORIDE 20 MG/1
CAPSULE ORAL
Qty: 90 CAPSULE | Refills: 3 | Status: SHIPPED | OUTPATIENT
Start: 2021-05-30 | End: 2021-06-02

## 2021-06-01 NOTE — PROGRESS NOTES
Date of Office Visit: 2021  Encounter Provider: Dr. Olman Berg  Place of Service: [facility] CARDIOLOGY [place]  Patient Name: Katarina Phillips  :1955  Chloe Segura MD    Chief Complaint   Patient presents with   • Atrial Fibrillation     post heart cath/ablation   • Hyperlipidemia   • Palpitations   • Hypertension   • Diabetes     History of Present Illness:    I am pleased to see Mrs. Phillips in my office today as a follow-up.    As you know, patient is 66-year-old white female whose past medical history is significant for atrial flutter, who came today for follow-up after recent hospital admission and discharge.    In May 2021, patient was admitted at [facility] with symptom of palpitation and gastroenteritis.  Patient was found to be in atrial flutter with rapid ventricular response.  Patient underwent ablation.  But patient had recurrence and had to had repeat atrial flutter ablative therapy.  Patient also underwent cardiac catheterization because of ongoing chest pain which showed nonobstructive CAD.  Echocardiogram showed normal left ventricle size and function with EF of 55%.    Since the discharge, patient is overall stable.  She denies any symptom of recurrence of palpitation.  She still have occasional chest pain.  This appears to be noncardiac.  Patient denies any orthopnea, PND, syncope or presyncope.  No leg edema noted.    EKG showed normal sinus rhythm.    Patient is stable.  She is maintaining sinus rhythm.  There is no recurrence of atrial flutter.  However patient blood pressure is elevated I would recommend to start losartan 25 mg daily.  I would not increase amlodipine because of side effect of leg edema.        Past Medical History:   Diagnosis Date   • Atrial fibrillation (CMS/HCC)    • Atrial flutter (CMS/HCC)    • Hyperlipidemia    • Hypertension    • Hyperthyroidism          Past Surgical History:   Procedure Laterality Date   • BACK  SURGERY     • CARDIAC CATHETERIZATION N/A 2021    Procedure: Left Heart Cath;  Surgeon: Olman Berg MD;  Location: [facility] CATH INVASIVE LOCATION;  Service: Cardiovascular;  Laterality: N/A;   • CARDIAC ELECTROPHYSIOLOGY PROCEDURE N/A 2021    Procedure: EP/Ablation;  Surgeon: Ori Tran MD;  Location: [facility] CATH INVASIVE LOCATION;  Service: Cardiovascular;  Laterality: N/A;   • CARDIAC ELECTROPHYSIOLOGY PROCEDURE N/A 2021    Procedure: EP/Ablation;  Surgeon: Ori Tran MD;  Location: [facility] CATH INVASIVE LOCATION;  Service: Cardiovascular;  Laterality: N/A;   • CATARACT EXTRACTION     • ORTHOPEDIC SURGERY      mx foot breaks with surgical repair/wrist surgery           Current Outpatient Medications:   •  amLODIPine (NORVASC) 5 MG tablet, Take 1 tablet by mouth Daily., Disp: 90 tablet, Rfl: 3  •  apixaban (ELIQUIS) 5 MG tablet tablet, Take 1 tablet by mouth Every 12 (Twelve) Hours. Indications: Atrial Fibrillation, Disp: 60 tablet, Rfl: 11  •  cholecalciferol (VITAMIN D3) 25 MCG (1000 UT) tablet, Take 1,000 Units by mouth Daily., Disp: , Rfl:   •  LORazepam (Ativan) 0.5 MG tablet, Take 1 tablet by mouth 2 (Two) Times a Day As Needed for Anxiety., Disp: 30 tablet, Rfl: 0  •  vitamin B-12 (CYANOCOBALAMIN) 1000 MCG tablet, Take 1,000 mcg by mouth Daily., Disp: , Rfl:   •  losartan (Cozaar) 25 MG tablet, Take 1 tablet by mouth Daily., Disp: 90 tablet, Rfl: 1      Social History     Socioeconomic History   • Marital status:      Spouse name: Not on file   • Number of children: Not on file   • Years of education: Not on file   • Highest education level: Not on file   Tobacco Use   • Smoking status: Former Smoker     Quit date: 1992     Years since quittin.1   • Smokeless tobacco: Never Used   Vaping Use   • Vaping Use: Never used   Substance and Sexual Activity   • Alcohol use: Not Currently   • Drug use: Never   • Sexual activity: Defer         Review of Systems  "  Constitutional: Negative for chills and fever.   HENT: Negative for ear discharge and nosebleeds.    Eyes: Negative for discharge and redness.   Cardiovascular: Positive for chest pain. Negative for orthopnea, palpitations, paroxysmal nocturnal dyspnea and syncope.   Respiratory: Negative for cough, shortness of breath and wheezing.    Endocrine: Negative for heat intolerance.   Skin: Negative for rash.   Musculoskeletal: Negative for arthritis and myalgias.   Gastrointestinal: Negative for abdominal pain, melena, nausea and vomiting.   Genitourinary: Negative for dysuria and hematuria.   Neurological: Negative for dizziness, light-headedness, numbness and tremors.   Psychiatric/Behavioral: Negative for depression. The patient is not nervous/anxious.        Procedures      ECG 12 Lead    Date/Time: 6/2/2021 1:54 PM  Performed by: Olman Berg MD  Authorized by: Olman Berg MD   Comparison: compared with previous ECG   Similar to previous ECG  Rhythm: sinus rhythm    Clinical impression: normal ECG            ECG 12 Lead    (Results Pending)           Objective:    /98   Pulse 73   Ht 160 cm (62.99\")   Wt 73 kg (161 lb)   BMI 28.53 kg/m²         Constitutional:       Appearance: Well-developed.   Eyes:      General: No scleral icterus.        Right eye: No discharge.   HENT:      Head: Normocephalic and atraumatic.   Neck:      Thyroid: No thyromegaly.      Lymphadenopathy: No cervical adenopathy.   Pulmonary:      Effort: Pulmonary effort is normal. No respiratory distress.      Breath sounds: Normal breath sounds. No wheezing. No rales.   Cardiovascular:      Normal rate. Regular rhythm.      No gallop.   Abdominal:      Tenderness: There is no abdominal tenderness.   Skin:     Findings: No erythema or rash.   Neurological:      Mental Status: Alert and oriented to person, place, and time.             Assessment:       Diagnosis Plan   1. Mixed hyperlipidemia  ECG 12 Lead    losartan (Cozaar) 25 MG " tablet   2. Essential hypertension  ECG 12 Lead    losartan (Cozaar) 25 MG tablet   3. Paroxysmal atrial fibrillation (CMS/HCC)  ECG 12 Lead    losartan (Cozaar) 25 MG tablet   4. Palpitations  ECG 12 Lead    losartan (Cozaar) 25 MG tablet            Plan:       MDM:    1.  Paroxysmal atrial fibrillation/flutter:    Patient is in sinus rhythm after the ablative therapy.    2.  Chest pain:    Patient continues to have chest pain but it appears to be noncardiac.  Continue to observe    3.  Hypertension:    I would recommend to start losartan 25 mg daily.  Blood pressure monitoring is recommended.    4.  Hyperlipidemia:    I would recommend to repeat lipid panel in future

## 2021-06-02 ENCOUNTER — OFFICE VISIT (OUTPATIENT)
Dept: CARDIOLOGY | Facility: CLINIC | Age: 66
End: 2021-06-02

## 2021-06-02 VITALS
HEART RATE: 73 BPM | DIASTOLIC BLOOD PRESSURE: 98 MMHG | WEIGHT: 161 LBS | SYSTOLIC BLOOD PRESSURE: 162 MMHG | HEIGHT: 63 IN | BODY MASS INDEX: 28.53 KG/M2

## 2021-06-02 DIAGNOSIS — I48.0 PAROXYSMAL ATRIAL FIBRILLATION (HCC): ICD-10-CM

## 2021-06-02 DIAGNOSIS — R00.2 PALPITATIONS: ICD-10-CM

## 2021-06-02 DIAGNOSIS — I10 ESSENTIAL HYPERTENSION: ICD-10-CM

## 2021-06-02 DIAGNOSIS — E78.2 MIXED HYPERLIPIDEMIA: Primary | ICD-10-CM

## 2021-06-02 PROCEDURE — 99214 OFFICE O/P EST MOD 30 MIN: CPT | Performed by: INTERNAL MEDICINE

## 2021-06-02 PROCEDURE — 93000 ELECTROCARDIOGRAM COMPLETE: CPT | Performed by: INTERNAL MEDICINE

## 2021-06-02 RX ORDER — LOSARTAN POTASSIUM 25 MG/1
25 TABLET ORAL DAILY
Qty: 90 TABLET | Refills: 1 | Status: SHIPPED | OUTPATIENT
Start: 2021-06-02 | End: 2021-11-11

## 2021-06-04 ENCOUNTER — TELEPHONE (OUTPATIENT)
Dept: FAMILY MEDICINE CLINIC | Facility: CLINIC | Age: 66
End: 2021-06-04

## 2021-06-04 NOTE — TELEPHONE ENCOUNTER
Looks like it was Dr. Berg that took it off her list  Call pharmacy and ask why they did not fill it  I had increased it to 60 mg but sent the refill as 20mg  So I may have to fix that but see if pharmacy has a reason

## 2021-06-04 NOTE — TELEPHONE ENCOUNTER
Caller: Katarina Phillips    Relationship: Self    Best call back number: [phone]         Which medication are you concerned about: FLUoxetine (PROzac) 20 MG capsule       Who prescribed you this medication: DR OLIVA    What are your concerns: PATIENT WENT TO PHARMACY TO GET A REFILL FOR THIS MEDICATION. PHARMACY TOLD PATIENT THAT THEY COULDN'T REFILL THIS MEDICATION AND TO CONTACT HER DOCTOR.

## 2021-06-04 NOTE — TELEPHONE ENCOUNTER
It is supposed to be 20mg cap, to take 3 caps daily. Per dr. Singleton gave the pharmacy a verbal and confirmed that isnurance covered it. copay $12. Pt notified

## 2021-07-02 ENCOUNTER — OFFICE VISIT (OUTPATIENT)
Dept: CARDIOLOGY | Facility: CLINIC | Age: 66
End: 2021-07-02

## 2021-07-02 VITALS
OXYGEN SATURATION: 92 % | WEIGHT: 159 LBS | SYSTOLIC BLOOD PRESSURE: 168 MMHG | DIASTOLIC BLOOD PRESSURE: 80 MMHG | HEART RATE: 78 BPM | BODY MASS INDEX: 28.17 KG/M2

## 2021-07-02 DIAGNOSIS — I48.0 PAROXYSMAL ATRIAL FIBRILLATION (HCC): ICD-10-CM

## 2021-07-02 DIAGNOSIS — I10 ESSENTIAL HYPERTENSION: ICD-10-CM

## 2021-07-02 DIAGNOSIS — Z86.79 STATUS POST ABLATION OF ATRIAL FIBRILLATION: Primary | ICD-10-CM

## 2021-07-02 DIAGNOSIS — Z98.890 STATUS POST ABLATION OF ATRIAL FIBRILLATION: Primary | ICD-10-CM

## 2021-07-02 DIAGNOSIS — I48.92 ATRIAL FLUTTER WITH RAPID VENTRICULAR RESPONSE (HCC): ICD-10-CM

## 2021-07-02 PROCEDURE — 99214 OFFICE O/P EST MOD 30 MIN: CPT | Performed by: INTERNAL MEDICINE

## 2021-07-02 RX ORDER — AMLODIPINE BESYLATE 10 MG/1
10 TABLET ORAL DAILY
Qty: 90 TABLET | Refills: 3 | Status: SHIPPED | OUTPATIENT
Start: 2021-07-02 | End: 2021-12-17 | Stop reason: SDUPTHER

## 2021-07-02 NOTE — PROGRESS NOTES
CC--recurrent atrial flutter post ablation, viral gastroenteritis      Subject  66-year-old female patient had a viral gastroenteritis and uncontrollable atrial flutter and underwent right atrial flutter ablation with termination with early recurrence and placed on the amiodarone .She had a prior history of hypertension and hyperthyroidism  TSH in the hospital within normal range  No syncope  Patient has recurrent arrhythmia and complains of atypical pain and underwent cardiac catheterization which showed no significant obstructive artery disease and subsequently underwent pulmonary vein isolation and redo right atrial flutter ablation and comes in for follow-up and denies any complains of palpitations      Past Medical History:   Diagnosis Date   • Hypertension    • Hyperthyroidism      Past Surgical History:   Procedure Laterality Date   • BACK SURGERY     • CARDIAC ELECTROPHYSIOLOGY PROCEDURE N/A 4/14/2021    Procedure: EP/Ablation;  Surgeon: Ori Tran MD;  Location: [facility] CATH INVASIVE LOCATION;  Service: Cardiovascular;  Laterality: N/A;       Review of Systems   General:  positive for fatigue and tiredness  Eyes: No redness  Cardiovascular: No chest pain,  palpitations  Respiratory:   no shortness of breath        Physical Exam  VITALS REVIEWED    General:      well developed, well nourished, in no acute distress.    Head:      normocephalic and atraumatic.    Eyes:      PERRL/EOM intact, conjunctiva and sclera clear with out nystagmus.    Neck:      no masses, thyromegaly,  trachea central with normal respiratory effort   Lungs:      clear bilaterally to auscultation.    Heart:       Rhythm without murmurs or gallops        Assessment and plan    Counterclockwise right atrial flutter status post complex ablation needing extensive ablation  with recurrence--associated with atrial fibrillation driving atrial flutter status post pulmonary vein isolation and redo right atrial flutter ablation with  resolution of symptoms of arrhythmias  Atypical chest pain presentation with cardiac catheterization showing without coronary artery stenosis  Hypertension not well controlled--increase norvasc dose--prescription given  Gastroenteritis resolved  Clinically doing well   meds reviewed  Chadsvasc score--3  Follow up in 3 months        Electronically signed by Ori Tran MD, 07/02/21, 10:07 AM EDT.

## 2021-07-26 ENCOUNTER — TELEPHONE (OUTPATIENT)
Dept: FAMILY MEDICINE CLINIC | Facility: CLINIC | Age: 66
End: 2021-07-26

## 2021-07-26 DIAGNOSIS — M25.559 HIP PAIN: Primary | ICD-10-CM

## 2021-07-26 NOTE — TELEPHONE ENCOUNTER
Caller: Katarina Phillips    Relationship: Self    Best call back number: [phone]    What is the medical concern/diagnosis: HIP PAIN    What specialty or service is being requested: ORTHOPEDIC    What is the provider, practice or medical service name:     What is the office location:     What is the office phone number:     Any additional details: PATIENT CALLED IN AND IS HAVING SEVERE PAIN IN HER RIGHT HIP. SHE IS WANTING A VERY EXPERIENCED/OLDER DOCTOR THAT WOULD BE ABLE TO HELP HER. PLEASE CALL PATIENT AND ADVISE

## 2021-07-26 NOTE — TELEPHONE ENCOUNTER
We have a couple of options locally that would fit her description  I would suggest Dr Garcia with [facility] over on [address]  If she is fine with that let me know and I will put the referral in

## 2021-07-26 NOTE — TELEPHONE ENCOUNTER
She would like the referral.  Jess pt said that she would like the referral info sent to her mychart and email.

## 2021-07-28 NOTE — TELEPHONE ENCOUNTER
PATIENT IS CALLING IN SHE STATES THAT SHE DID GET THE NAME OF THE PROVIDER BUT NO OTHER INFORMATION CAME THROUGH AND SHE WAS UPSET THAT SOMEONE DID NOT BOTHER TO SEND PROVIDER INFORMATION WITH THE MESSAGE AND THAT SHE HAD TO CALLBACK TO THE OFFICE ANYWAY.      I DID LOOK UP DOCTOR AND GIVE HER NUMBER AND ADDRESS BUT SHE WANTED TO REITERATE HOW THAT WAS NOT A GOOD THING THAT NO INFORMATION CAME WITH THE MESSAGE.

## 2021-07-29 NOTE — TELEPHONE ENCOUNTER
I didn;t send anything to the patient. I just saw the referral, not this note. I faxed to Dr Klein's office for them to call patient, which is what I always do. [facility]

## 2021-07-30 ENCOUNTER — TELEPHONE (OUTPATIENT)
Dept: FAMILY MEDICINE CLINIC | Facility: CLINIC | Age: 66
End: 2021-07-30

## 2021-07-30 DIAGNOSIS — M25.559 HIP PAIN: Primary | ICD-10-CM

## 2021-07-30 NOTE — TELEPHONE ENCOUNTER
PATIENT STATES:THAT SHE WOULD LIKE A REFERRAL TO ORTHOPEDIC SURGERY AGAIN SHE DIDN'T LIKE THE LAST ONE THEY WERE RUDE SHE WANTS TO STAY OUT OF THE [facility] AND STAY IN [place]   PLEASE ADVISE      PATIENT CAN BE REACHED ON: [phone]

## 2021-08-03 ENCOUNTER — OFFICE VISIT (OUTPATIENT)
Dept: ORTHOPEDIC SURGERY | Facility: CLINIC | Age: 66
End: 2021-08-03

## 2021-08-03 VITALS — BODY MASS INDEX: 26.8 KG/M2 | HEIGHT: 64 IN | WEIGHT: 157 LBS

## 2021-08-03 DIAGNOSIS — M54.50 LUMBAR SPINE PAIN: ICD-10-CM

## 2021-08-03 DIAGNOSIS — M25.551 RIGHT HIP PAIN: Primary | ICD-10-CM

## 2021-08-03 DIAGNOSIS — M54.16 LUMBAR RADICULAR PAIN: Primary | ICD-10-CM

## 2021-08-03 PROCEDURE — 99204 OFFICE O/P NEW MOD 45 MIN: CPT | Performed by: ORTHOPAEDIC SURGERY

## 2021-08-03 RX ORDER — METHYLPREDNISOLONE 4 MG/1
TABLET ORAL
Qty: 21 TABLET | Refills: 0 | Status: SHIPPED | OUTPATIENT
Start: 2021-08-03 | End: 2021-09-27

## 2021-08-03 RX ORDER — TRAMADOL HYDROCHLORIDE 50 MG/1
50 TABLET ORAL EVERY 12 HOURS PRN
Qty: 40 TABLET | Refills: 0 | Status: SHIPPED | OUTPATIENT
Start: 2021-08-03 | End: 2021-08-23 | Stop reason: SDUPTHER

## 2021-08-03 NOTE — PROGRESS NOTES
"Chief Complaint  Establish Care of the Right Hip    Subjective    History of Present Illness      Katarina Phillips is a 66 y.o. female who presents to [facility] for chronic low back pain and right hip pain.  History of Present Illness     The patient is a very pleasant 66-year-old female who has a chronic history of low back pain. In fact, she has had a total of 4 surgeries on different parts of her spine. She does have pain that radiates from the lumbar spine into the buttock and the thigh. Most of the time, the pain is radiating to the left lower extremity. She states that she is unable to lean forward and tie her shoes. She also finds it very difficult to put on her socks. She has pain and discomfort in her bilateral hips, which radiates from the groin to the proximal femur. The symptoms of the right hip are worse than the left hip. She does not have any risk factors for avascular necrosis. She states her injuries have significantly worsened over approximately the last 1 week without a history of injury. 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 She has had a total of 3 heart procedures since 04/2021.    Pain Location: RIGHT hip  Radiation: radiates into groin   Quality: sharp, stabbing, piercing  Intensity/Severity: severe  Duration: 1 week  Progression of symptoms: yes, progressive worsening  Onset quality: gradual   Timing: constant  Aggravating Factors: going up and down stairs, rising after sitting, walking, standing  Alleviating Factors: rest, ice  Previous Episodes: no  Associated Symptoms: pain, decreased strength, numbness/tingling  ADLs Affected: ambulating, recreational activities/sports  Previous Treatment: NSAIDs       Objective   Vital Signs:   Ht 162.6 cm (64\")   Wt 71.2 kg (157 lb)   BMI 26.95 kg/m²     Physical Exam  Physical Exam  Vitals signs and nursing note reviewed.   Constitutional:       Appearance: Normal " appearance.   Pulmonary:      Effort: Pulmonary effort is normal.   Skin:     General: Skin is warm and dry.      Capillary Refill: Capillary refill takes less than 2 seconds.   Neurological:      General: No focal deficit present.      Mental Status: He is alert and oriented to person, place, and time. Mental status is at baseline.   Psychiatric:         Mood and Affect: Mood normal.         Behavior: Behavior normal.         Thought Content: Thought content normal.         Judgment: Judgment normal.     Ortho Exam   Bilateral SI JOINT: Mild tenderness is present dorsally over the SI joint. Figure of 4 sign is positive. There is mild spasm present in the erector spinae muscle. Flexion and extension are associated with discomfort. Deep tendon reflexes are intact and symmetrical on both lower extremities. Pain radiates into the buttock on extension of the hip. No evidence of cauda equina syndrome. No motor or sensory deficit is noted. There is no bowel or bladder involvement.  Bilateral hip (gtb): Skin and soft tissues over the greater trochanteric bursa are tender upon palpation, along with IT band tenderness. Cross body adduction is negative. Internal and external rotations are bothersome and cause tenderness over the greater trochanter. There is no clinical deformity and no shortening. She does have a limp upon walking. There is piriformis tightness and there  is capsular tightness with any rotation. Dorsalis pedis and posterior tibial artery pulses are palpable. Neurovascular status is intact and capillary refill is less than 2 seconds. Common peroneal nerve function is well preserved.      Result Review :   The following data was reviewed by: Doug Cunha MD on 08/03/2021:  Radiologic studies - see below for interpretation  right hip xrays ap/lateralviews were performed at [facility] on 08/03/2021. These images were independently viewed and interpreted by myself, my impression as follows:    bilateral Hip  X-Ray  Indication: Pain and discomfort in her bilateral hips, right worse than left  AP and lateral  Findings: There is moderately advanced osteoarthritis of the right hip noted. There is osteophyte formation superiorly and inferiorly over the femoral head. There is a global narrowing of the joint space on the right side. On the left side, the hip is fairly well-preserved. The lumbar spine also shows some narrowing of the disc space height between L4-5 and L5-S1.  no bony lesion  Soft tissues within normal limits  decreased joint spaces  Hardware appropriately positioned not applicable      no prior studies available for comparison.    X-RAY was ordered and reviewed by Doug Cunha MD            Procedures           Assessment   Assessment and Plan    Diagnoses and all orders for this visit:    1. Right hip pain (Primary)  -     XR Hip With or Without Pelvis 2 - 3 View Right  -     MRI Hip Right Without Contrast; Future  -     MRI Lumbar Spine Without Contrast; Future    2. Lumbar spine pain  -     MRI Lumbar Spine Without Contrast; Future          Follow Up   ·   · Rest, ice, compression, and elevation (RICE) therapy  · Stretching and strengthening exercises  · OTC Tylenol 500-1000mg by mouth every 6 hours as needed for pain   · Follow up in 4 week(s) discussed the findings of the MRI of the hip and the spine.  · If she does have positive findings on the spinal MRI we will be glad to refer her to a neurosurgical colleague.  · Also discussed with the patient about referral to the pain management clinic for LESI of the lumbar spine based on MRI findings of the lumbar spine.  · Discussed with the patient about possible hip management after the MRI of the hip has been obtained.  • Patient was given instructions and counseling regarding her condition or for health maintenance advice. Please see specific information pulled into the AVS if appropriate.   • Controlled substance treatment options discussed in detail.  Patient's signed consent to medical options on file. RAKESH form in chart.  The patient is being provided this narcotic prescription to address the acute medical condition that they are undergoing/experiencing at this time.  It is my medical and surgical assessment that more than 3 days of narcotic medication is necessary to help control the pain and discomfort that this patient is experiencing at this point.  Risks of narcotic medication usage outlined.  Possibility of physical and psychological dependence and abuse, especially long term, emphasized to the patient.  I have explained to the patient that we will try to wean them off the narcotic medication as soon as possible and introduce non-narcotic modalities of pain control.  At this point in the patient's clinical spectrum, however, alternative available treatments are inadequate to control their pain and symptoms.  I have also discussed the possibility of random drug testing as well as pill counts to prevent misuse and misappropriation of the narcotic medications prescribed to the patient.  • Use a cane for assistance with ambulation.   • The patient is given a prescription of a Depo-Medrol Dosepak to be taken over 6 days to help with the acute onset of radiculopathy. The mechanism of steroid efficacy was discussed with the patient.   • She may take 1 tablet Ultram 50 mg by mouth every 12 hours as needed for pain and discomfort.   • Schedule an MRI of the right hip for evaluation of avascular necrosis versus an occult fracture that is unable to be seen on x-rays.   • Schedule an MRI of the lumbar spine for evaluation of a bulging disc and possible pathology that could be affecting the lower extremity causing radiculopathy.      Doug Cunha MD   Date of Encounter: 8/3/2021       Transcribed from ambient dictation for Doug Cunha MD by Omero Issa.  08/04/21   20:32 EDT    I have personally performed the services described in this document as transcribed by  the above individual, and it is both accurate and complete.  Doug Cunha MD  8/5/2021  08:14 EDT

## 2021-08-04 PROBLEM — M25.551 RIGHT HIP PAIN: Status: ACTIVE | Noted: 2021-08-04

## 2021-08-04 PROBLEM — M54.50 LUMBAR SPINE PAIN: Status: ACTIVE | Noted: 2021-08-04

## 2021-08-23 ENCOUNTER — TELEPHONE (OUTPATIENT)
Dept: ORTHOPEDIC SURGERY | Facility: CLINIC | Age: 66
End: 2021-08-23

## 2021-08-23 DIAGNOSIS — M54.16 LUMBAR RADICULAR PAIN: ICD-10-CM

## 2021-08-23 RX ORDER — DIAZEPAM 10 MG/1
TABLET ORAL
Qty: 1 TABLET | Refills: 0 | Status: SHIPPED | OUTPATIENT
Start: 2021-08-23 | End: 2021-12-08

## 2021-08-23 RX ORDER — TRAMADOL HYDROCHLORIDE 50 MG/1
50 TABLET ORAL EVERY 12 HOURS PRN
Qty: 40 TABLET | Refills: 0 | Status: SHIPPED | OUTPATIENT
Start: 2021-08-23 | End: 2021-09-06 | Stop reason: SDUPTHER

## 2021-08-23 NOTE — TELEPHONE ENCOUNTER
Caller: LIGIA SARGENT   Relationship to Patient: SELF     Phone Number: [phone]   Reason for Call: PATIENT CALLING STATING THAT SHE IS IN A LOT OF PAIN AND DOESN'T KNOW WHAT TO DO BECAUSE SHE HAS NOT MORE OF THE MEDICATION DR DELEON GAVE HER, PATIENT STATES PAIN IS RUNNING UP INTO HER NECK, PATIENT IS ASKING IF SHE CAN BE PUT TO SLEEP TO DO AN MRI, AND SHE STATES SHE WOULD LIKE TO BE SEEN ASAP. SHE SAID IF SHE DOESN'T GET ANYTHING FOR PAIN SHE IS GOING TO ADMIT HERSELF TO THE HOSPITAL, SHE CAN NOT BEND, SIT OR LAY DOWN WITHOUT PAIN

## 2021-08-23 NOTE — TELEPHONE ENCOUNTER
Spoke with the patient and she is requesting some pain medication to hold her over. Also I have pended Valium for the patient so she can try to relax while getting the MRI     Medication has been pended.   Tramadol and Valium

## 2021-08-24 ENCOUNTER — APPOINTMENT (OUTPATIENT)
Dept: MRI IMAGING | Facility: HOSPITAL | Age: 66
End: 2021-08-24

## 2021-08-24 ENCOUNTER — TELEPHONE (OUTPATIENT)
Dept: ORTHOPEDIC SURGERY | Facility: CLINIC | Age: 66
End: 2021-08-24

## 2021-08-24 NOTE — TELEPHONE ENCOUNTER
MY CHART MESSAGE       ----- Message from Katarina Phillips sent at 8/24/2021  9:48 AM EDT -----  Regarding: Visit Follow-Up Question  Contact: [phone]  Dear Dr. Cunha,   I was told by TEJA, that only my back MRI was approved.  It is really the hip MRI that is most urgent, I have left several messages now with your office.  Insurance seems to think I first need to see you prior to MRI, I explained I did see you and that you ordered both MRI to determine what is going on inside my body.  My MRI for Tuesday 24th, have been canceled, and rescheduled for September 17 to give time for approval.  If there is anything you can do to expedite the MRI approval it would be greatly appreciated.  I am in so much pain, and can not really move at all without shocking pain, now I can't seem to  my right arm, without terrible pain.  Doc, I am a mess!  Please help me!

## 2021-08-31 NOTE — TELEPHONE ENCOUNTER
This has been approved we are just waiting for the patient to reschedule her MRI's and then I will work her in for the results.

## 2021-09-06 DIAGNOSIS — M54.16 LUMBAR RADICULAR PAIN: ICD-10-CM

## 2021-09-07 RX ORDER — TRAMADOL HYDROCHLORIDE 50 MG/1
50 TABLET ORAL EVERY 12 HOURS PRN
Qty: 40 TABLET | Refills: 0 | Status: SHIPPED | OUTPATIENT
Start: 2021-09-07 | End: 2021-09-30

## 2021-09-17 ENCOUNTER — APPOINTMENT (OUTPATIENT)
Dept: MRI IMAGING | Facility: HOSPITAL | Age: 66
End: 2021-09-17

## 2021-09-27 ENCOUNTER — HOSPITAL ENCOUNTER (EMERGENCY)
Facility: HOSPITAL | Age: 66
Discharge: HOME | End: 2021-09-27
Admitting: EMERGENCY MEDICINE

## 2021-09-27 ENCOUNTER — APPOINTMENT (OUTPATIENT)
Dept: GENERAL RADIOLOGY | Facility: HOSPITAL | Age: 66
End: 2021-09-27

## 2021-09-27 VITALS
RESPIRATION RATE: 20 BRPM | WEIGHT: 158.73 LBS | SYSTOLIC BLOOD PRESSURE: 131 MMHG | OXYGEN SATURATION: 100 % | HEART RATE: 80 BPM | BODY MASS INDEX: 29.21 KG/M2 | HEIGHT: 62 IN | TEMPERATURE: 98 F | DIASTOLIC BLOOD PRESSURE: 69 MMHG

## 2021-09-27 DIAGNOSIS — M54.2 CHRONIC NECK PAIN: Primary | ICD-10-CM

## 2021-09-27 DIAGNOSIS — G89.29 CHRONIC NECK PAIN: Primary | ICD-10-CM

## 2021-09-27 DIAGNOSIS — M54.2 NECK PAIN: ICD-10-CM

## 2021-09-27 PROCEDURE — 63710000001 PREDNISONE PER 5 MG: Performed by: NURSE PRACTITIONER

## 2021-09-27 PROCEDURE — 63710000001 ONDANSETRON ODT 4 MG TABLET DISPERSIBLE: Performed by: NURSE PRACTITIONER

## 2021-09-27 PROCEDURE — 72050 X-RAY EXAM NECK SPINE 4/5VWS: CPT

## 2021-09-27 PROCEDURE — 99283 EMERGENCY DEPT VISIT LOW MDM: CPT

## 2021-09-27 RX ORDER — PREDNISONE 20 MG/1
20 TABLET ORAL DAILY
Qty: 5 TABLET | Refills: 0 | Status: SHIPPED | OUTPATIENT
Start: 2021-09-27 | End: 2021-10-04

## 2021-09-27 RX ORDER — HYDROCODONE BITARTRATE AND ACETAMINOPHEN 5; 325 MG/1; MG/1
1 TABLET ORAL EVERY 6 HOURS PRN
Qty: 12 TABLET | Refills: 0 | Status: SHIPPED | OUTPATIENT
Start: 2021-09-27 | End: 2021-10-04

## 2021-09-27 RX ORDER — METHOCARBAMOL 500 MG/1
500 TABLET, FILM COATED ORAL 4 TIMES DAILY
Qty: 20 TABLET | Refills: 0 | Status: SHIPPED | OUTPATIENT
Start: 2021-09-27 | End: 2021-11-05

## 2021-09-27 RX ORDER — ONDANSETRON 4 MG/1
4 TABLET, ORALLY DISINTEGRATING ORAL ONCE
Status: COMPLETED | OUTPATIENT
Start: 2021-09-27 | End: 2021-09-27

## 2021-09-27 RX ORDER — METHOCARBAMOL 500 MG/1
500 TABLET, FILM COATED ORAL ONCE
Status: COMPLETED | OUTPATIENT
Start: 2021-09-27 | End: 2021-09-27

## 2021-09-27 RX ORDER — HYDROCODONE BITARTRATE AND ACETAMINOPHEN 7.5; 325 MG/1; MG/1
1 TABLET ORAL ONCE
Status: COMPLETED | OUTPATIENT
Start: 2021-09-27 | End: 2021-09-27

## 2021-09-27 RX ADMIN — METHOCARBAMOL 500 MG: 500 TABLET ORAL at 10:46

## 2021-09-27 RX ADMIN — PREDNISONE 60 MG: 10 TABLET ORAL at 10:47

## 2021-09-27 RX ADMIN — HYDROCODONE BITARTRATE AND ACETAMINOPHEN 1 TABLET: 7.5; 325 TABLET ORAL at 10:47

## 2021-09-27 RX ADMIN — ONDANSETRON 4 MG: 4 TABLET, ORALLY DISINTEGRATING ORAL at 10:45

## 2021-09-29 ENCOUNTER — TELEPHONE (OUTPATIENT)
Dept: FAMILY MEDICINE CLINIC | Facility: CLINIC | Age: 66
End: 2021-09-29

## 2021-09-29 NOTE — TELEPHONE ENCOUNTER
I do not prescribe controlled pain medication, if this is what she is wanting she will have to see pain management for that.  I would be able to provide that referral now.    She can wait to see what Dr. Singleton would be willing to do once she returns next week, if she would like.

## 2021-09-29 NOTE — TELEPHONE ENCOUNTER
Spoke to the patient and gave her the information provided.  She does not understand why her doctors office wont give her the pain medicine or the referral to neuro surgery. She only has 1 day of pain medicine left. She should not have to see another doctor in order to get the medicine to hold her over till she sees the neurosurgeon.   I did explain that some providers do not prescribe pain medicine and we refer to pain management. Also explained that Dr. Singleton was sent the message on [employer] yesterday but she is out of the office this week due to continuing education classes.

## 2021-09-29 NOTE — TELEPHONE ENCOUNTER
Left detailed message on pts voicemail - pt name on it. Also informed her that pain management referral was mentioned on her AVS discharge sheet from the ER along with Ortho referral.     HUB TO READ.

## 2021-09-29 NOTE — TELEPHONE ENCOUNTER
"PATIENT CALLED TO CHECK THE STATUS OF THE [employer] MESSAGE THAT SHE PUT BACK TO DR. ANTHONY PERRY ON 09/27/21:    I was taken to ER today in severe pain, I could not lift my arms, and the left one is numb, I have been suffering with this since 8/7/21, I just can not take this much pain.  Dr. Sexton saw me on the 8/5/21 for my hip, two days later my arms stopped working.  He prescribed \"Tramadol\" for the pain, I informed him it was not working very well it was too weak.  However,  he didn't give me anything else.        At ER, I was told to follow up with you for medication and a referral to a good Fernando Surgeon in [place].   ER said  likely a nerve being pinched!  I have MRI's scheduled for hip and spine on 10/8.  If you would be so kind as to prescribe Methocarbam 500mg, Prednisone 20mg and Hydrocod/acetam 5-325mg.  Until I can see the Fernando Surgeon.  Arnold U    PATIENT UNDERSTANDS THAT DR. OLIVA IS BUSY, BUT SHE ONLY HAS ONE DAY OF MEDICATION LEFT AND THE PAIN SHE IS EXPERIENCING IS DEBILITATING.    PLEASE ADVISE   [phone]   "

## 2021-09-30 DIAGNOSIS — M54.16 LUMBAR RADICULAR PAIN: ICD-10-CM

## 2021-09-30 RX ORDER — MELOXICAM 7.5 MG/1
7.5 TABLET ORAL DAILY
Qty: 40 TABLET | Refills: 0 | Status: SHIPPED | OUTPATIENT
Start: 2021-09-30 | End: 2021-10-28

## 2021-09-30 RX ORDER — TRAMADOL HYDROCHLORIDE 50 MG/1
50 TABLET ORAL EVERY 12 HOURS PRN
Qty: 40 TABLET | Refills: 0 | Status: SHIPPED | OUTPATIENT
Start: 2021-09-30 | End: 2021-10-20

## 2021-09-30 NOTE — TELEPHONE ENCOUNTER
----- Message from Doug Cunha MD sent at 9/30/2021  9:13 AM EDT -----  Regarding: FW: Prescription Question  Contact: [phone]      I am sorry to hear that you are having so much trouble with your arms and neck.  We do have a good neurosurgeon in [facility] and his name is Dr. Mahesh Hendrix.  He is taking new patients and I am sure he will be able to help you.  I can call you in some Ultram 50 mg tab 1 p.o. every 12 as needed pain total 40 pills.  This might help you to get by till you see the neurosurgeon and till such time that your PCP Dr. Segura gets back in town.  In addition you can definitely use the meloxicam 40 mg tab 1 p.o. nightly for pain swelling and discomfort.  Hope you get to feeling better soon thank you    ----- Message -----  From: Jenny Spaulding MA  Sent: 9/30/2021   8:16 AM EDT  To: Doug Cunha MD  Subject: Prescription Question                            Please advise.     ----- Message -----  From: Katarina Phillips  Sent: 9/29/2021   1:53 PM EDT  To: [facility]  Subject: Prescription Question                            Dr. Cunha, I was taken to the ER for severe pain in my arms and neck, the nerve is being crushed!  ER wanted me to follow up with primary Dr. Kinney, however she is out of town!!!  I need a referral to a good Fernando Surgeon in [place], and to extend the perceptions the ER gave me., something for pain, mussels, and inflammation.  Just until I can get into to see a professional regarding my arms and neck.  Please help me!!  I have MRI's scheduled for 10-8 spine and hip.  But I only have enough pain meds for 1 more day.  Then I'll have to go back to ER the pain is overwhelming.  Please understand I can not move my shoulders and my left arm is going numb...  I don't know where else to turn...  Please see ER visit 9-27, for a list of meds.

## 2021-10-01 ENCOUNTER — TELEPHONE (OUTPATIENT)
Dept: NEUROLOGY | Facility: OTHER | Age: 66
End: 2021-10-01

## 2021-10-01 DIAGNOSIS — M54.2 CHRONIC NECK PAIN: Primary | ICD-10-CM

## 2021-10-01 DIAGNOSIS — G89.29 CHRONIC NECK PAIN: Primary | ICD-10-CM

## 2021-10-01 NOTE — TELEPHONE ENCOUNTER
PT CALLED AND STATES THAT HER PCP WAS SUPPOSED TO BE SENDING OVER A REFERRAL-PT STATES THAT SHE HAS BEEN HAVING PROBLEMS BECAUSE THE OFFICE WILL NOT SEND IT-PT STATES THAT SHE WAS ALSO TOLD BY  TO MAKE AN APPT. TO BE SEEN-PT STATES THAT SHE HAS HAD OVER 9 BACK SURGERIES-LAST WAS IN 2006 WITH -PT HAS MRI SCHEDULED FOR 10/08/21

## 2021-10-04 ENCOUNTER — OFFICE VISIT (OUTPATIENT)
Dept: CARDIOLOGY | Facility: CLINIC | Age: 66
End: 2021-10-04

## 2021-10-04 VITALS
BODY MASS INDEX: 29.63 KG/M2 | HEART RATE: 88 BPM | SYSTOLIC BLOOD PRESSURE: 134 MMHG | WEIGHT: 162 LBS | DIASTOLIC BLOOD PRESSURE: 80 MMHG | OXYGEN SATURATION: 98 %

## 2021-10-04 DIAGNOSIS — I10 ESSENTIAL HYPERTENSION: ICD-10-CM

## 2021-10-04 DIAGNOSIS — I48.0 PAROXYSMAL ATRIAL FIBRILLATION (HCC): ICD-10-CM

## 2021-10-04 DIAGNOSIS — Z98.890 STATUS POST ABLATION OF ATRIAL FIBRILLATION: Primary | ICD-10-CM

## 2021-10-04 DIAGNOSIS — Z86.79 STATUS POST ABLATION OF ATRIAL FIBRILLATION: Primary | ICD-10-CM

## 2021-10-04 DIAGNOSIS — I48.92 ATRIAL FLUTTER WITH RAPID VENTRICULAR RESPONSE (HCC): ICD-10-CM

## 2021-10-04 PROCEDURE — 99213 OFFICE O/P EST LOW 20 MIN: CPT | Performed by: INTERNAL MEDICINE

## 2021-10-04 PROCEDURE — 93000 ELECTROCARDIOGRAM COMPLETE: CPT | Performed by: INTERNAL MEDICINE

## 2021-10-04 NOTE — PROGRESS NOTES
CC--recurrent atrial flutter post ablation, viral gastroenteritis      Subject  66-year-old female patient had a viral gastroenteritis and uncontrollable atrial flutter and underwent right atrial flutter ablation with termination with early recurrence and placed on the amiodarone .She had a prior history of hypertension and hyperthyroidism  TSH in the hospital within normal range  No syncope  Patient has recurrent arrhythmia and complains of atypical pain and underwent cardiac catheterization which showed no significant obstructive artery disease and subsequently underwent pulmonary vein isolation and redo right atrial flutter ablation and comes in for follow-up and denies any complains of palpitations  Since last seen having MS problems with neck and back problems      Past Medical History:   Diagnosis Date   • Hypertension    • Hyperthyroidism      Past Surgical History:   Procedure Laterality Date   • BACK SURGERY     • CARDIAC ELECTROPHYSIOLOGY PROCEDURE N/A 4/14/2021    Procedure: EP/Ablation;  Surgeon: Ori Tran MD;  Location: [facility] INVASIVE LOC[facility];  Service: Cardiovascular;  Laterality: N/A;       Review of Systems   General:  positive for fatigue and tiredness  Eyes: No redness  Cardiovascular: No chest pain,  palpitations  Respiratory:   no shortness of breath        Physical Exam  VITALS REVIEWED    General:      well developed, well nourished, in no acute distress.    Head:      normocephalic and atraumatic.    Eyes:      PERRL/EOM intact, conjunctiva and sclera clear with out nystagmus.    Neck:      no masses, thyromegaly,  trachea central with normal respiratory effort   Lungs:      clear bilaterally to auscultation.    Heart:       Rhythm without murmurs or gallops        Assessment and plan    Counterclockwise right atrial flutter status post complex ablation needing extensive ablation  with recurrence--associated with atrial fibrillation driving atrial flutter status post pulmonary  vein isolation and redo right atrial flutter ablation with resolution of symptoms of arrhythmias  Atypical chest pain presentation with cardiac catheterization showing without coronary artery stenosis  Hypertension  well controlled  Gastroenteritis resolved  MS problems--awaiting MRI  Clinically doing well   meds reviewed  Chadsvasc score--3  Follow up in 6 months        ECG 12 Lead    Date/Time: 10/4/2021 10:30 AM  Performed by: Ori Tran MD  Authorized by: Ori Tran MD   Comparison: compared with previous ECG   Similar to previous ECG  Rhythm: sinus rhythm  Rate: normal  Conduction: conduction normal  QRS axis: normal            Electronically signed by Ori Tran MD, 10/04/21, 10:29 AM EDT.

## 2021-10-05 ENCOUNTER — TELEPHONE (OUTPATIENT)
Dept: FAMILY MEDICINE CLINIC | Facility: CLINIC | Age: 66
End: 2021-10-05

## 2021-10-05 RX ORDER — PREDNISONE 20 MG/1
20 TABLET ORAL DAILY
Qty: 5 TABLET | Refills: 0 | Status: SHIPPED | OUTPATIENT
Start: 2021-10-05 | End: 2021-10-10

## 2021-10-05 NOTE — TELEPHONE ENCOUNTER
----- Message from Maureen Alvarez MA sent at 10/4/2021  7:31 AM EDT -----  Regarding: FW: Referral Request  Contact: [phone]    ----- Message -----  From: Katarina Phillips  Sent: 10/4/2021   5:18 AM EDT  To: [facility]  Subject: Referral Request                                 This is my second requests, I need a referral to Dr. Simeon King Surgeon he has openings in two weeks and I need to schedule an appointment.  Mri's are being done on Friday 10-8, so I can see him the following week.  PLEASE DO THIS AS SOON AS IS POSSIBLE I AM IN TERRIBLE PAIN.... CANT MOVE MY ARMS/SHOULDERS CAN'T SLEEP.     Thank you  Katarina Phillips  [phone]

## 2021-10-05 NOTE — TELEPHONE ENCOUNTER
----- Message from Kira Sparks MA sent at 10/4/2021  1:07 PM EDT -----  Regarding: FW: Prescription Question  Contact: [phone]    ----- Message -----  From: Katarina Phillips  Sent: 10/4/2021   1:06 PM EDT  To: [facility]  Subject: Prescription Question                            At ER, I was told to follow up with MY PRIAMARY (you) for medication and a referral to a good Fernando Surgeon in [place].   ER said  likely a nerve being pinched!  I have MRI's scheduled for hip and spine on 10/8.  If you would be so kind as to prescribe , Prednisone 20mg and Hydrocod/acetam 5-325mg.  Until I can see the Fernando Surgeon.  I realize these are opioids however, Tramadol does not have any affect on my pain.  I need something stronger!  I  really need you to consider my situation, I just need enough till I am able to see the Surgeon.  Please help me doc, I can't sleep or move my shoulders, dress my self etc.  The pain is overwhelming!!    Thank you,  Katarina Phillips  [phone]

## 2021-10-08 ENCOUNTER — HOSPITAL ENCOUNTER (OUTPATIENT)
Dept: MRI IMAGING | Facility: HOSPITAL | Age: 66
Discharge: HOME | End: 2021-10-08

## 2021-10-08 DIAGNOSIS — M54.50 LUMBAR SPINE PAIN: ICD-10-CM

## 2021-10-08 DIAGNOSIS — M25.551 RIGHT HIP PAIN: ICD-10-CM

## 2021-10-08 PROCEDURE — 73721 MRI JNT OF LWR EXTRE W/O DYE: CPT

## 2021-10-08 PROCEDURE — 72148 MRI LUMBAR SPINE W/O DYE: CPT

## 2021-10-18 NOTE — PROGRESS NOTES
"Subjective   History of Present Illness: Katarina Phillips is a 66 y.o. female is being seen for consultation today at the request of MONIK Vo for neck and arm pain.  Patient reports chronic history of neck pain but a recent onset of neck pain that did radiate into her anterior arms left over right that began in August.  She reported that 1 day her arms just \"went out \".  She was seeing orthopedics for hip issue when this happened and was placed on a inflammatories, muscle relaxers and tramadol which helped slightly.  Patient describes the pain is constant in her arms and when she moves a shock sharp like pain.  She also describes electrical shock that goes down her neck and spine when she turns over in bed.  She is feels like her arms are weak and she reports numbness in her bilateral thumbs.  She does report a history of 3 neck surgeries with  15 to 20 years ago where she describes discectomies but she is unsure of level.      Neck Pain   This is a recurrent problem. The current episode started more than 1 month ago. The problem occurs constantly. The problem has been gradually worsening. The pain is present in the left side, right side and midline. Quality: shocking. The symptoms are aggravated by position, twisting and bending (any movement). The pain is same all the time. Stiffness is present all day. Associated symptoms include headaches, numbness, tingling ( left arm) and weakness. Pertinent negatives include no chest pain. She has tried NSAIDs, heat and muscle relaxants for the symptoms. The treatment provided no relief.       The following portions of the patient's history were reviewed and updated as appropriate: allergies, current medications, past family history, past medical history, past social history, past surgical history and problem list.    Review of Systems   Constitutional: Positive for activity change.   Eyes: Negative.    Respiratory: Negative for chest tightness " "and shortness of breath.    Cardiovascular: Negative for chest pain.   Gastrointestinal: Negative.    Endocrine: Negative.    Genitourinary: Negative.    Musculoskeletal: Positive for arthralgias, gait problem, myalgias, neck pain and neck stiffness.   Skin: Negative.    Neurological: Positive for tingling ( left arm), weakness, numbness and headaches.   Hematological: Negative.    All other systems reviewed and are negative.      Objective     ./94   Pulse 96   Temp 98.4 °F (36.9 °C)   Resp 18   Ht 157.5 cm (62.01\")   Wt 73.5 kg (162 lb)   SpO2 97%   BMI 29.62 kg/m²    Body mass index is 29.62 kg/m².      Physical Exam  Neurological:      Deep Tendon Reflexes:      Reflex Scores:       Tricep reflexes are 3+ on the right side and 3+ on the left side.       Bicep reflexes are 4+ on the right side and 4+ on the left side.       Brachioradialis reflexes are 3+ on the right side and 3+ on the left side.      Neurologic Exam     Motor Exam     Strength   Right biceps: 4/5  Left biceps: 4/5  Right triceps: 5/5  Left triceps: 4/5    Gait, Coordination, and Reflexes     Gait  Gait: (forward flexed)    Reflexes   Right brachioradialis: 3+  Left brachioradialis: 3+  Right biceps: 4+  Left biceps: 4+  Right triceps: 3+  Left triceps: 3+  Right Loya: absent  Left Loya: present  Right ankle clonus: absent  Left ankle clonus: absent      Spine Musculoskeletal Exam    Gait      Calcaneal comment: forward flexed    Strength      Cervical Spine          Right Cervical Spine        Triceps: 5/5        Left Cervical Spine        Triceps: 4/5    Sensory      Cervical Spine          Right Cervical Spine        Thumb/index finger: decreased        Left Cervical Spine        Thumb/index finger: decreased    Reflexes        Right Spine          Biceps: 4+      Brachioradialis: 3+      Triceps: 3+      Loya: absent      Left Spine          Biceps: 4+        Brachioradialis: 3+      Triceps: 3+      Loya: " present      Assessment/Plan   Independent Review of Radiographic Studies:      I personally reviewed the images from the following studies.    Cervical x-rays 9/27/2021    Multilevel spondylosis most noted at C5-C6 with disc space narrowing.    Medical Decision Making:      Katarina Phillipsis a 66 y.o. female being seen as new patient to clinic for neck and arm pain.  Patient has significant history of stent x1 and aflutter s/p ablation and is on Eliquis.  Patient has pretty recent onset of pain into her arms that began in August of this year.  Her physical exam was concerning for cervical myelopathy.  She was weak in her bicep and triceps left over right.  She was hyperreflexic as well.  She had a strong positive Marcia sign.  Patient only had cervical x-rays completed.  She was refusing any gabapentin as she said it did not work for her.  She will be sent for MRI cervical spine with and without contrast due to her past neck surgeries.  I will also place her on a course of steroids to help with some of the inflammation.    Procedures      Diagnoses and all orders for this visit:    1. Cervical spondylosis with myelopathy (Primary)  -     Cancel: MRI Cervical Spine Without Contrast; Future  -     MRI Cervical Spine With & Without Contrast; Future    Other orders  -     methylPREDNISolone (MEDROL) 4 MG dose pack; Take as directed on package instructions.  Dispense: 1 each; Refill: 0      Return for Next scheduled follow up.    This patient was examined wearing appropriate personal protective equipment.     Katarina Phillips  reports that she quit smoking about 29 years ago. She has never used smokeless tobacco..     Patient's Body mass index is 29.62 kg/m². indicating that she is overweight (BMI 25-29.9). Obesity-related health conditions include the following: hypertension, dyslipidemias and GERD. Obesity is unchanged. BMI is is above average; BMI management plan is completed. We discussed portion  control and increasing exercise..        Patient's blood pressure was reviewed.  Recommendations for  a low-salt diet and exercise to maintain/improve BP in addition to taking any presribed medications.             Clarissa Rosales, MONIK  10/20/21  10:06 EDT

## 2021-10-20 ENCOUNTER — TELEPHONE (OUTPATIENT)
Dept: NEUROSURGERY | Facility: CLINIC | Age: 66
End: 2021-10-20

## 2021-10-20 ENCOUNTER — OFFICE VISIT (OUTPATIENT)
Dept: NEUROSURGERY | Facility: CLINIC | Age: 66
End: 2021-10-20

## 2021-10-20 VITALS
BODY MASS INDEX: 29.81 KG/M2 | WEIGHT: 162 LBS | HEIGHT: 62 IN | RESPIRATION RATE: 18 BRPM | HEART RATE: 96 BPM | OXYGEN SATURATION: 97 % | DIASTOLIC BLOOD PRESSURE: 94 MMHG | SYSTOLIC BLOOD PRESSURE: 174 MMHG | TEMPERATURE: 98.4 F

## 2021-10-20 DIAGNOSIS — M48.02 SPINAL STENOSIS OF CERVICAL REGION: Primary | ICD-10-CM

## 2021-10-20 DIAGNOSIS — M47.12 CERVICAL SPONDYLOSIS WITH MYELOPATHY: Primary | ICD-10-CM

## 2021-10-20 DIAGNOSIS — M99.01 CERVICAL (NECK) REGION SOMATIC DYSFUNCTION: ICD-10-CM

## 2021-10-20 PROCEDURE — 99214 OFFICE O/P EST MOD 30 MIN: CPT | Performed by: NURSE PRACTITIONER

## 2021-10-20 RX ORDER — HYDROCODONE BITARTRATE AND ACETAMINOPHEN 10; 325 MG/1; MG/1
1 TABLET ORAL EVERY 6 HOURS PRN
Qty: 50 TABLET | Refills: 0 | Status: SHIPPED | OUTPATIENT
Start: 2021-10-20 | End: 2021-11-05 | Stop reason: SDUPTHER

## 2021-10-20 RX ORDER — METHYLPREDNISOLONE 4 MG/1
TABLET ORAL
Qty: 1 EACH | Refills: 0 | Status: SHIPPED | OUTPATIENT
Start: 2021-10-20 | End: 2021-11-05

## 2021-10-20 NOTE — TELEPHONE ENCOUNTER
[facility]. [facility] was given the contact information for [facility] and will set up her appointment Referral faxed to [facility].

## 2021-10-20 NOTE — TELEPHONE ENCOUNTER
Called the patient to see where outside of  she would like to go for her Urgent MRI as [facility] cannot do her MRI until November 16th. I had to LVM to call the office. Her  called back and was not sure where she was, he thought she was at the hospital. Called and LVM on her mobile number to call the office.

## 2021-10-28 ENCOUNTER — OFFICE VISIT (OUTPATIENT)
Dept: ORTHOPEDIC SURGERY | Facility: CLINIC | Age: 66
End: 2021-10-28

## 2021-10-28 DIAGNOSIS — M16.11 PRIMARY OSTEOARTHRITIS OF RIGHT HIP: ICD-10-CM

## 2021-10-28 DIAGNOSIS — M25.551 RIGHT HIP PAIN: Primary | ICD-10-CM

## 2021-10-28 PROCEDURE — 99443 PR PHYS/QHP TELEPHONE EVALUATION 21-30 MIN: CPT | Performed by: ORTHOPAEDIC SURGERY

## 2021-10-28 RX ORDER — MELOXICAM 7.5 MG/1
TABLET ORAL
Qty: 40 TABLET | Refills: 0 | Status: SHIPPED | OUTPATIENT
Start: 2021-10-28 | End: 2021-12-08

## 2021-10-28 NOTE — PROGRESS NOTES
TELEPHONE VISIT    Patient: Katarina Phillips    YOB: 1955  You have chosen to receive care through a telephone visit. Do you consent to use a telephone visit for your medical care today? Yes  MRN: [id number]    Chief Complaint   Patient presents with   • Lumbar Spine - Results   • Right Hip - Results       History of Present Illness: Patient returns for hip pain. The pain is over the posterior aspect of the right hip at the sacroiliac joint. It has been progressive in nature but remains intermittent . Her is worsened by prolonged standing or walking, cross body activities and laying on the affected side. She denies numbness and tingling. She has had improvement in the past with heat.   She has issues with her lumbar spine and has had a previous fusion.  She is going to be seen by Dr. Mahesh Hendrix who is a neurosurgeon and going to be counseled with regards to the management of her cervical spine and upper extremity disease as well as the lower lumbar spine disease.  She continues to have right hip and groin pain.  She has some difficulty with ambulation.  She states that at this point her hip and groin pain is tolerable.    This problem is not new to this examiner.     Allergies:   Allergies   Allergen Reactions   • Doxycycline Hives   • Sulfa Antibiotics Hives       Medications:   Home Medications:  Current Outpatient Medications on File Prior to Visit   Medication Sig   • amLODIPine (NORVASC) 10 MG tablet Take 1 tablet by mouth Daily.   • apixaban (ELIQUIS) 5 MG tablet tablet Take 1 tablet by mouth Every 12 (Twelve) Hours. Indications: Atrial Fibrillation   • cholecalciferol (VITAMIN D3) 25 MCG (1000 UT) tablet Take 1,000 Units by mouth Daily.   • diazePAM (Valium) 10 MG tablet TAKE ONE TABLET BY MOUTH ONE HOUR PRIOR TO MRI PLEASE HAVE SOMEONE DRIVE YOU TO AND FROM MRI   • HYDROcodone-acetaminophen (NORCO)  MG per tablet Take 1 tablet by mouth Every 6 (Six) Hours As Needed for Moderate Pain   or Severe Pain  for up to 30 days.   • losartan (Cozaar) 25 MG tablet Take 1 tablet by mouth Daily.   • meloxicam (Mobic) 7.5 MG tablet Take 1 tablet by mouth Daily.   • methocarbamol (ROBAXIN) 500 MG tablet Take 1 tablet by mouth 4 (Four) Times a Day.   • methylPREDNISolone (MEDROL) 4 MG dose pack Take as directed on package instructions.   • vitamin B-12 (CYANOCOBALAMIN) 1000 MCG tablet Take 1,000 mcg by mouth Daily.     No current facility-administered medications on file prior to visit.     Current Medications:  Scheduled Meds:  PRN Meds:.    I have reviewed the patient's medical history in detail and updated the computerized patient record.  Review and summarization of old records include:    Past Medical History:   Diagnosis Date   • Atrial fibrillation (HCC)    • Atrial flutter (HCC)    • Hyperlipidemia    • Hypertension    • Hyperthyroidism      Past Surgical History:   Procedure Laterality Date   • BACK SURGERY     • CARDIAC CATHETERIZATION N/A 2021    Procedure: Left Heart Cath;  Surgeon: Olman Berg MD;  Location: [facility] CATH INVASIVE LOCATION;  Service: Cardiovascular;  Laterality: N/A;   • CARDIAC ELECTROPHYSIOLOGY PROCEDURE N/A 2021    Procedure: EP/Ablation;  Surgeon: Ori Tran MD;  Location:  [facility] CATH INVASIVE LOCATION;  Service: Cardiovascular;  Laterality: N/A;   • CARDIAC ELECTROPHYSIOLOGY PROCEDURE N/A 2021    Procedure: EP/Ablation;  Surgeon: Ori Tran MD;  Location: [facility] CATH INVASIVE LOCATION;  Service: Cardiovascular;  Laterality: N/A;   • CATARACT EXTRACTION     • ORTHOPEDIC SURGERY      mx foot breaks with surgical repair/wrist surgery     Social History     Occupational History   • Not on file   Tobacco Use   • Smoking status: Former Smoker     Quit date: 1992     Years since quittin.5   • Smokeless tobacco: Never Used   Vaping Use   • Vaping Use: Never used   Substance and Sexual Activity   • Alcohol use: Not Currently   • Drug use:  "Never   • Sexual activity: Defer      Social History     Social History Narrative   • Not on file     No family history on file.    Review of Systems        Wt Readings from Last 3 Encounters:   10/20/21 73.5 kg (162 lb)   10/04/21 73.5 kg (162 lb)   09/27/21 72 kg (158 lb 11.7 oz)     Ht Readings from Last 3 Encounters:   10/20/21 157.5 cm (62.01\")   09/27/21 157.5 cm (62\")   08/03/21 162.6 cm (64\")     There is no height or weight on file to calculate BMI.  No height and weight on file for this encounter.  There were no vitals filed for this visit.    Physical Exam    Musculoskeletal:    Right hip (djd): Neurovascular status is intact. Patient does have a limp on the affected side. Internal and external rotations are associated with pain and discomfort. Anterior joint line pain and tenderness is significant. Stinchfield sign is positive. Figure of 4 sign is positive. Patient is unable to perform an active straight leg raise exam. Greater trochanter is tender. Crossover adduction test is positive. Cross body adduction is limited and painful for the patient. Patient has very significant limp and joint line tenderness anteriorly, posteriorly and medially. Dorsalis pedis and posterior tibial artery pulses are palpable. Common peroneal nerve function is well preserved.         Diagnostics:  Diagnostics include an MRI of the hip.  Right hip MRI images are available and discussed with the patient.  She has moderate right hip chondromalacia with synovial thickening.  There are milder changes on the left side.  There is some tendinitis of the gluteus minimus and medius.  There is edema in the proximal adductor muscles as well.  No evidence of avascular necrosis or fracture of the femoral head are noted.    MRI of the lumbar spine are consistent with degenerative changes at multiple levels in the lumbar spine.  She has had prior hemilaminectomies at L4-5 and L5-S1 level.  There is moderate right-sided foraminal stenosis at " L5-S1 and advanced disc height at L5-S1 as well.    Procedure:  Procedures      Assessment:   Diagnoses and all orders for this visit:    1. Right hip pain (Primary)    2. Primary osteoarthritis of right hip          Plan:   · ExTensive discussion of the patient at this point.  She will be seen by the neurosurgical service and possibly consider LESI at the pain clinic for lumbar spine symptoms.  · As far as the hip pathology is concerned she understands the extent of the pathology and we will defer hip replacement surgery till such time that her symptoms progress and she is actually doing a little bit worse.  We will see her back in the office in January for a follow-up and decide the next course of action.  At this point the patient is agreeable with a nonoperative line of management for the hip pathology.  · Rest, ice, compression, and elevation (RICE) therapy  · OTC Tylenol 500-1000mg by mouth every 6 hours as needed for pain   · Follow up in 3 month(s)    Date of encounter: 10/28/2021   Doug Cunha MD   This visit has been rescheduled as a phone visit to comply with patient safety concerns in accordance with CDC recommendations. Total time of discussion was 25 minutes.

## 2021-11-03 NOTE — PROGRESS NOTES
Neurosurgical Consultation      Katarina Phillips is a 66 y.o. female is here today for follow-up with a new cervical MRI.    Chief Complaint   Patient presents with   • Neck Pain     neck pain that radiates down both arms and into her hands. Patient also reports bilateral arm weakness.        Previous treatment: Norco, Mobic, Medrol, Robaxin, Tramadol, physical therapy, injections, LEXI.     HPI: This is a 66-year-old woman who presents to the neurosurgery clinic for evaluation of neck and left greater than right arm pain.  She was evaluated by Clarissa Rosales on October 20 of this year.  Based on her evaluation she recommended an MRI of the cervical spine and follow-up with me.    The patient has a chronic history of back and leg pain.  She notes that she has a chronic left-sided foot drop secondary to disc abnormality in her low back in the past.  She reportedly had multiple surgeries in the 1980s.  Then again in 2006 she underwent a posterior cervical discectomy at reportedly 3 levels.  She was on chronic opioids.  However she eventually weaned herself off of these medications.  She was being evaluated for possible hip pathology by an orthopedic surgery in early August.  She notes that after undergoing x-rays a few days later she had sudden onset severe neck pain and left greater than right arm pain.  The pain in her neck, shoulder and arms is constant in nature.  It is a burning-like sensation.  It does not coincide with a specific dermatome.  The pain was severe enough that she did go to the emergency department.  She was prescribed tramadol and then eventually hydrocodone.  She does get some pain relief with the hydrocodone.  She says she is recently gained 20 pounds secondary to lack of physical activity that occurs secondary to this pain.  She has also undergone oral steroid therapy without significant improvement in her symptoms.      Past Medical History:   Diagnosis Date   • Atrial fibrillation (HCC)     • Atrial flutter (HCC)    • Hyperlipidemia    • Hypertension    • Hyperthyroidism         Past Surgical History:   Procedure Laterality Date   • BACK SURGERY     • CARDIAC CATHETERIZATION N/A 5/11/2021    Procedure: Left Heart Cath;  Surgeon: Olman Berg MD;  Location: [facility] CATH INVASIVE LOCATION;  Service: Cardiovascular;  Laterality: N/A;   • CARDIAC ELECTROPHYSIOLOGY PROCEDURE N/A 4/14/2021    Procedure: EP/Ablation;  Surgeon: Ori Tran MD;  Location: [facility] CATH INVASIVE LOCATION;  Service: Cardiovascular;  Laterality: N/A;   • CARDIAC ELECTROPHYSIOLOGY PROCEDURE N/A 5/11/2021    Procedure: EP/Ablation;  Surgeon: Ori Tran MD;  Location: [facility] CATH INVASIVE LOCATION;  Service: Cardiovascular;  Laterality: N/A;   • CATARACT EXTRACTION     • ORTHOPEDIC SURGERY      mx foot breaks with surgical repair/wrist surgery        Current Outpatient Medications on File Prior to Visit   Medication Sig Dispense Refill   • amLODIPine (NORVASC) 10 MG tablet Take 1 tablet by mouth Daily. 90 tablet 3   • apixaban (ELIQUIS) 5 MG tablet tablet Take 1 tablet by mouth Every 12 (Twelve) Hours. Indications: Atrial Fibrillation 60 tablet 11   • cholecalciferol (VITAMIN D3) 25 MCG (1000 UT) tablet Take 1,000 Units by mouth Daily.     • diazePAM (Valium) 10 MG tablet TAKE ONE TABLET BY MOUTH ONE HOUR PRIOR TO MRI PLEASE HAVE SOMEONE DRIVE YOU TO AND FROM MRI 1 tablet 0   • HYDROcodone-acetaminophen (NORCO)  MG per tablet Take 1 tablet by mouth Every 6 (Six) Hours As Needed for Moderate Pain  or Severe Pain  for up to 30 days. 50 tablet 0   • losartan (Cozaar) 25 MG tablet Take 1 tablet by mouth Daily. 90 tablet 1   • meloxicam (MOBIC) 7.5 MG tablet Take 1 tablet by mouth once daily 40 tablet 0   • methylPREDNISolone (MEDROL) 4 MG dose pack Take as directed on package instructions. 1 each 0   • vitamin B-12 (CYANOCOBALAMIN) 1000 MCG tablet Take 1,000 mcg by mouth Daily.     • methocarbamol (ROBAXIN) 500 MG  "tablet Take 1 tablet by mouth 4 (Four) Times a Day. 20 tablet 0     No current facility-administered medications on file prior to visit.        Allergies   Allergen Reactions   • Doxycycline Hives   • Sulfa Antibiotics Hives        Social History     Socioeconomic History   • Marital status:    Tobacco Use   • Smoking status: Former Smoker     Quit date: 1992     Years since quittin.5   • Smokeless tobacco: Never Used   Vaping Use   • Vaping Use: Never used   Substance and Sexual Activity   • Alcohol use: Not Currently   • Drug use: Never   • Sexual activity: Defer          Review of Systems   Constitutional: Positive for activity change.   Respiratory: Negative for chest tightness and shortness of breath.    Cardiovascular: Negative for chest pain.   Musculoskeletal: Positive for arthralgias, gait problem, myalgias, neck pain and neck stiffness.        Bilateral arm pain   Neurological: Positive for weakness, numbness ( bilateral arm- into the fingers) and headache.        Physical Examination:     Vitals:    21 1001   BP: 159/91   Pulse: 84   Resp: 16   Temp: 98.6 °F (37 °C)   SpO2: 95%   Weight: 73.5 kg (162 lb)   Height: 157.5 cm (62\")        Physical Exam  Eyes:      General: Lids are normal.      Extraocular Movements: Extraocular movements intact.      Pupils: Pupils are equal, round, and reactive to light.   Neurological:      Deep Tendon Reflexes:      Reflex Scores:       Tricep reflexes are 3+ on the right side and 3+ on the left side.       Bicep reflexes are 3+ on the right side and 3+ on the left side.  Psychiatric:         Speech: Speech normal.          Neurological Exam  Mental Status  Awake, alert and oriented to person, place and time. Speech is normal. Language is fluent with no aphasia.    Cranial Nerves  CN II: Visual acuity is normal. Visual fields full to confrontation.  CN III, IV, VI: Extraocular movements intact bilaterally. Normal lids and orbits bilaterally. Pupils " equal round and reactive to light bilaterally.  CN V: Facial sensation is normal.  CN VII: Full and symmetric facial movement.  CN IX, X: Palate elevates symmetrically. Normal gag reflex.  CN XI: Shoulder shrug strength is normal.  CN XII: Tongue midline without atrophy or fasciculations.    Motor                                               Right                     Left  Deltoid                                   3+                          3+   Biceps                                   4+                          4+   Triceps                                  4                          4   Finger flexor                          3+                          3+   Finger extensor                     4-                          4-    Sensory  Hypersensitive in the bilateral upper extremities.  Consistent with allodynia..    Reflexes                                           Right                      Left  Biceps                                 3+                         3+  Triceps                                3+                         3+    Right pathological reflexes: Marcia's present.  Left pathological reflexes: Marcia's absent.    Coordination  Right: Finger-to-nose normal.  Left: Finger-to-nose normal.       Result Review  The following data was reviewed by: Patrick Frank MD on 11/04/2021:    Data reviewed: Radiologic studies MRI of the cervical spine with and without contrast from October 29, 2021 was personally reviewed.  There is spondylolisthesis at multiple levels however there is no significant central canal stenosis.  Multilevel bilateral enlargement of the nerve root sleeve of undetermined significance.  Multilevel mild to moderate neuroforaminal narrowing.     Assessment:  This is a 66-year-old woman with relatively acute onset left greater than right arm pain as well as associated neck pain.  She has signs of myelopathy on physical exam as well as weakness of multiple muscle groups and nerve  roots.  Her MRI imaging does not explain the totality of her symptoms.  I am concerned she has a global neurologic issue something in the realm of a Parsonage-Horn syndrome.  I have referred her to neurology as well as requested an EMG/nerve conduction study.  If this returns indicative of nerve root pathology we would discuss possible cervical transforaminal injections.  Either way she will return to see me after the EMG/nerve conduction study is completed.    Diagnoses and all orders for this visit:    1. Neck pain (Primary)    2. Parsonage-Horn syndrome  -     Ambulatory Referral to Neurology  -     EMG All Extremities; Future  -     Nerve Conduction Test All Extremities; Future         Return for Recheck after completion of EMG/NCS.            Patrick Frank MD

## 2021-11-04 ENCOUNTER — OFFICE VISIT (OUTPATIENT)
Dept: NEUROSURGERY | Facility: CLINIC | Age: 66
End: 2021-11-04

## 2021-11-04 VITALS
HEART RATE: 84 BPM | OXYGEN SATURATION: 95 % | RESPIRATION RATE: 16 BRPM | TEMPERATURE: 98.6 F | DIASTOLIC BLOOD PRESSURE: 91 MMHG | HEIGHT: 62 IN | BODY MASS INDEX: 29.81 KG/M2 | WEIGHT: 162 LBS | SYSTOLIC BLOOD PRESSURE: 159 MMHG

## 2021-11-04 DIAGNOSIS — M54.2 NECK PAIN: Primary | ICD-10-CM

## 2021-11-04 DIAGNOSIS — G54.5 PARSONAGE-TURNER SYNDROME: ICD-10-CM

## 2021-11-04 PROCEDURE — 99214 OFFICE O/P EST MOD 30 MIN: CPT | Performed by: NEUROLOGICAL SURGERY

## 2021-11-05 DIAGNOSIS — M48.02 SPINAL STENOSIS OF CERVICAL REGION: ICD-10-CM

## 2021-11-05 DIAGNOSIS — M99.01 CERVICAL (NECK) REGION SOMATIC DYSFUNCTION: ICD-10-CM

## 2021-11-05 RX ORDER — HYDROCODONE BITARTRATE AND ACETAMINOPHEN 10; 325 MG/1; MG/1
1 TABLET ORAL EVERY 6 HOURS PRN
Qty: 50 TABLET | Refills: 0 | Status: SHIPPED | OUTPATIENT
Start: 2021-11-05 | End: 2021-11-19

## 2021-11-11 DIAGNOSIS — I10 ESSENTIAL HYPERTENSION: ICD-10-CM

## 2021-11-11 DIAGNOSIS — I48.0 PAROXYSMAL ATRIAL FIBRILLATION (HCC): ICD-10-CM

## 2021-11-11 DIAGNOSIS — E78.2 MIXED HYPERLIPIDEMIA: ICD-10-CM

## 2021-11-11 DIAGNOSIS — R00.2 PALPITATIONS: ICD-10-CM

## 2021-11-11 RX ORDER — LOSARTAN POTASSIUM 25 MG/1
TABLET ORAL
Qty: 90 TABLET | Refills: 0 | Status: SHIPPED | OUTPATIENT
Start: 2021-11-11 | End: 2021-11-29

## 2021-11-18 ENCOUNTER — TELEPHONE (OUTPATIENT)
Dept: NEUROLOGY | Facility: CLINIC | Age: 66
End: 2021-11-18

## 2021-11-18 NOTE — TELEPHONE ENCOUNTER
Caller: [facility]    Relationship to patient: PROVIDER    Best call back number: [phone]    Chief complaint: WORSENING SYMPTOMS    Type of visit: EMG ALL EXTREMITIES AND NEW PATIENT    Requested date: ASAP    If rescheduling, when is the original appointment: 12/17, 12/21, 03/09    Additional notes: [facility] IS CALLING TO STATE THAT REFERRALS ARE BEING UPDATED TO URGENT.  SHE REPORTS THAT PT HAS LOST ALL FUNCTION IN HER ARMS.    PLEASE ADVISE IF ANY EARLIER SCHEDULING IS AVAILABLE.

## 2021-11-19 ENCOUNTER — TELEPHONE (OUTPATIENT)
Dept: FAMILY MEDICINE CLINIC | Facility: CLINIC | Age: 66
End: 2021-11-19

## 2021-11-19 RX ORDER — GABAPENTIN 300 MG/1
CAPSULE ORAL
Qty: 100 CAPSULE | Refills: 1 | Status: SHIPPED | OUTPATIENT
Start: 2021-11-19 | End: 2021-12-08

## 2021-11-19 RX ORDER — HYDROCODONE BITARTRATE AND ACETAMINOPHEN 10; 325 MG/1; MG/1
1 TABLET ORAL EVERY 6 HOURS PRN
Qty: 120 TABLET | Refills: 0 | Status: SHIPPED | OUTPATIENT
Start: 2021-11-19 | End: 2021-12-19

## 2021-11-24 DIAGNOSIS — R00.2 PALPITATIONS: ICD-10-CM

## 2021-11-24 DIAGNOSIS — I48.0 PAROXYSMAL ATRIAL FIBRILLATION (HCC): ICD-10-CM

## 2021-11-24 DIAGNOSIS — E78.2 MIXED HYPERLIPIDEMIA: ICD-10-CM

## 2021-11-24 DIAGNOSIS — I10 ESSENTIAL HYPERTENSION: ICD-10-CM

## 2021-11-24 NOTE — PROGRESS NOTES
"EMG and Nerve Conduction Studies    The complete report includes the data sheets.     Date of Study:11-    Referring Provider:DR RIVERA    History: This patient is a 66-year-old female who complains of pain in the neck and both upper extremities.  She complains of : \" numbness and tingling in arms and hands, can't open bottles, keep dropping things, am loosing strength in both arms and hands.  And I am in terrible pain all the time it won't go away.  It starts at my neck and continues down to my arms and hands, my hands hurt so much even with pain meds I can hardly move my fingers.\"    She has had cervical spine surgery in the past.    Results:    1.  The median sensory and motor nerve conduction studies were normal bilaterally.    2.  The ulnar sensory and motor nerve conduction studies were normal bilaterally.    3 EMG of the muscles examined in the bilateral C5-T1 myotomes were essentially normal.  Insertional activity was normal and there were no fibrillations or positive sharp waves in any of the muscles tested.  The recruitment pattern appeared to be proportional to effort.    Impression:    This is an essentially normal study of the upper extremities.  There is no evidence of peripheral neuropathy, focal median or ulnar neuropathy or significant neurogenic changes in the muscles examined in the bilateral C5-T1 myotomes      Electronically signed by :    Joseph Seipel, M.D.  November 29, 2021                    "

## 2021-11-29 ENCOUNTER — PROCEDURE VISIT (OUTPATIENT)
Dept: NEUROLOGY | Facility: CLINIC | Age: 66
End: 2021-11-29

## 2021-11-29 DIAGNOSIS — M79.601 PAIN IN BOTH UPPER EXTREMITIES: Primary | ICD-10-CM

## 2021-11-29 DIAGNOSIS — M79.602 PAIN IN BOTH UPPER EXTREMITIES: Primary | ICD-10-CM

## 2021-11-29 PROCEDURE — 95886 MUSC TEST DONE W/N TEST COMP: CPT | Performed by: PSYCHIATRY & NEUROLOGY

## 2021-11-29 PROCEDURE — 95910 NRV CNDJ TEST 7-8 STUDIES: CPT | Performed by: PSYCHIATRY & NEUROLOGY

## 2021-11-29 RX ORDER — LOSARTAN POTASSIUM 25 MG/1
TABLET ORAL
Qty: 90 TABLET | Refills: 0 | Status: SHIPPED | OUTPATIENT
Start: 2021-11-29 | End: 2021-12-17 | Stop reason: SDUPTHER

## 2021-12-07 NOTE — PROGRESS NOTES
Date of Office Visit: 2021  Encounter Provider: Dr. Olman Berg  Place of Service: [facility] CARDIOLOGY [place]  Patient Name: Katarina Phillips  :1955  Andrews Ramírez MD    Chief Complaint   Patient presents with   • Atrial Fibrillation     6 month follow up   • Hypertension   • Hyperlipidemia   • Palpitations   • Diabetes     History of Present Illness    I am pleased to see Mrs. Phillips in my office today as a follow-up.    As you know, patient is 66-year-old white female whose past medical history is significant for atrial flutter, who came today for follow-up after recent hospital admission and discharge.    In May 2021, patient was admitted at [facility] with symptom of palpitation and gastroenteritis.  Patient was found to be in atrial flutter with rapid ventricular response.  Patient underwent ablation.  But patient had recurrence and had to had repeat atrial flutter ablative therapy.  Patient also underwent cardiac catheterization because of ongoing chest pain which showed nonobstructive CAD.  Echocardiogram showed normal left ventricle size and function with EF of 55%.    Since the previous visit, patient is diagnosed with Parsonage Horn syndrome.  Patient is having intense pain in upper extremities.  Patient follows with specialist.  Patient denies any recurrence of atrial fibrillation.  No palpitation.  No orthopnea PND no syncope or presyncope.  No leg edema noted.    EKG showed sinus rhythm.  Heart rate is 87.    From a cardiac standpoint patient is stable.  She has not had any further episode of atrial flutter/fibrillation.  Current treatment would be continued.  I will see the patient in 1 year      Past Medical History:   Diagnosis Date   • Abnormal ECG 2021   • Atrial fibrillation (HCC)    • Atrial flutter (HCC)    • Hyperlipidemia    • Hypertension    • Hyperthyroidism          Past Surgical History:   Procedure Laterality Date   • ABLATION OF  DYSRHYTHMIC FOCUS  21- / 21   • BACK SURGERY     • CARDIAC CATHETERIZATION N/A 2021    Procedure: Left Heart Cath;  Surgeon: Olman Berg MD;  Location: [facility] CATH INVASIVE LOCATION;  Service: Cardiovascular;  Laterality: N/A;   • CARDIAC ELECTROPHYSIOLOGY PROCEDURE N/A 2021    Procedure: EP/Ablation;  Surgeon: Ori Tran MD;  Location: [facility] CATH INVASIVE LOCATION;  Service: Cardiovascular;  Laterality: N/A;   • CARDIAC ELECTROPHYSIOLOGY PROCEDURE N/A 2021    Procedure: EP/Ablation;  Surgeon: Ori Tran MD;  Location: [facility] CATH INVASIVE LOCATION;  Service: Cardiovascular;  Laterality: N/A;   • CATARACT EXTRACTION     • ORTHOPEDIC SURGERY      mx foot breaks with surgical repair/wrist surgery           Current Outpatient Medications:   •  amLODIPine (NORVASC) 10 MG tablet, Take 1 tablet by mouth Daily., Disp: 90 tablet, Rfl: 3  •  apixaban (ELIQUIS) 5 MG tablet tablet, Take 1 tablet by mouth Every 12 (Twelve) Hours. Indications: Atrial Fibrillation, Disp: 60 tablet, Rfl: 11  •  cholecalciferol (VITAMIN D3) 25 MCG (1000 UT) tablet, Take 1,000 Units by mouth Daily., Disp: , Rfl:   •  HYDROcodone-acetaminophen (Norco)  MG per tablet, Take 1 tablet by mouth Every 6 (Six) Hours As Needed for Moderate Pain  or Severe Pain  for up to 30 days., Disp: 120 tablet, Rfl: 0  •  losartan (COZAAR) 25 MG tablet, Take 1 tablet by mouth once daily, Disp: 90 tablet, Rfl: 0  •  vitamin B-12 (CYANOCOBALAMIN) 1000 MCG tablet, Take 1,000 mcg by mouth Daily., Disp: , Rfl:       Social History     Socioeconomic History   • Marital status:    Tobacco Use   • Smoking status: Former Smoker     Packs/day: 1.00     Years: 12.00     Pack years: 12.00     Types: Cigarettes     Start date: 1980     Quit date: 1992     Years since quittin.6   • Smokeless tobacco: Never Used   • Tobacco comment: Have not smoked in over 30 years +   Vaping Use   • Vaping Use: Never used  "  Substance and Sexual Activity   • Alcohol use: Never   • Drug use: Not Currently     Frequency: 1.0 times per week     Types: Marijuana     Comment: Tried eatable once/to replace Opioid  use of 30 years   • Sexual activity: Not Currently     Partners: Male     Birth control/protection: None     Comment: Don't need birth control         Review of Systems   Constitutional: Negative for chills and fever.   HENT: Negative for ear discharge and nosebleeds.    Eyes: Negative for discharge and redness.   Cardiovascular: Negative for chest pain, orthopnea, palpitations, paroxysmal nocturnal dyspnea and syncope.   Respiratory: Negative for cough, shortness of breath and wheezing.    Endocrine: Negative for heat intolerance.   Skin: Negative for rash.   Musculoskeletal: Positive for arthritis and joint pain. Negative for myalgias.   Gastrointestinal: Negative for abdominal pain, melena, nausea and vomiting.   Genitourinary: Negative for dysuria and hematuria.   Neurological: Negative for dizziness, light-headedness, numbness and tremors.   Psychiatric/Behavioral: Negative for depression. The patient is not nervous/anxious.        Procedures      ECG 12 Lead    Date/Time: 12/8/2021 10:15 AM  Performed by: Olman Berg MD  Authorized by: Olman Berg MD   Comparison: compared with previous ECG   Similar to previous ECG  Rhythm: sinus rhythm    Clinical impression: normal ECG            ECG 12 Lead    (Results Pending)           Objective:    /82   Pulse 87   Ht 157.5 cm (62.01\")   Wt 75.3 kg (166 lb)   BMI 30.35 kg/m²         Constitutional:       Appearance: Well-developed.   Eyes:      General: No scleral icterus.        Right eye: No discharge.   HENT:      Head: Normocephalic and atraumatic.   Neck:      Thyroid: No thyromegaly.      Lymphadenopathy: No cervical adenopathy.   Pulmonary:      Effort: Pulmonary effort is normal. No respiratory distress.      Breath sounds: Normal breath sounds. No wheezing. No " rales.   Cardiovascular:      Normal rate. Regular rhythm.      No gallop.   Abdominal:      Tenderness: There is no abdominal tenderness.   Skin:     Findings: No erythema or rash.   Neurological:      Mental Status: Alert and oriented to person, place, and time.             Assessment:       Diagnosis Plan   1. Mixed hyperlipidemia  ECG 12 Lead   2. Paroxysmal atrial fibrillation (HCC)  ECG 12 Lead   3. Essential hypertension  ECG 12 Lead            Plan:       MDM:    1.  Paroxysmal atrial fibrillation:    Patient is in normal sinus rhythm.  Current treatment would be continued.    2.  Hypertension:    Blood pressure is very well controlled.  Patient is on losartan and amlodipine.  This would be continued    3.  Parsonage-Horn syndrome:    I advised the patient to follow-up with a specialist

## 2021-12-08 ENCOUNTER — OFFICE VISIT (OUTPATIENT)
Dept: CARDIOLOGY | Facility: CLINIC | Age: 66
End: 2021-12-08

## 2021-12-08 VITALS
SYSTOLIC BLOOD PRESSURE: 138 MMHG | HEIGHT: 62 IN | WEIGHT: 166 LBS | HEART RATE: 87 BPM | DIASTOLIC BLOOD PRESSURE: 82 MMHG | BODY MASS INDEX: 30.55 KG/M2

## 2021-12-08 DIAGNOSIS — E78.2 MIXED HYPERLIPIDEMIA: Primary | ICD-10-CM

## 2021-12-08 DIAGNOSIS — I48.0 PAROXYSMAL ATRIAL FIBRILLATION (HCC): ICD-10-CM

## 2021-12-08 DIAGNOSIS — I10 ESSENTIAL HYPERTENSION: ICD-10-CM

## 2021-12-08 PROCEDURE — 93000 ELECTROCARDIOGRAM COMPLETE: CPT | Performed by: INTERNAL MEDICINE

## 2021-12-08 PROCEDURE — 99214 OFFICE O/P EST MOD 30 MIN: CPT | Performed by: INTERNAL MEDICINE

## 2021-12-10 ENCOUNTER — TELEPHONE (OUTPATIENT)
Dept: FAMILY MEDICINE CLINIC | Facility: CLINIC | Age: 66
End: 2021-12-10

## 2021-12-10 DIAGNOSIS — I10 ESSENTIAL HYPERTENSION: ICD-10-CM

## 2021-12-10 DIAGNOSIS — E78.2 MIXED HYPERLIPIDEMIA: ICD-10-CM

## 2021-12-10 DIAGNOSIS — I48.0 PAROXYSMAL ATRIAL FIBRILLATION (HCC): ICD-10-CM

## 2021-12-10 DIAGNOSIS — R00.2 PALPITATIONS: ICD-10-CM

## 2021-12-10 RX ORDER — MELOXICAM 7.5 MG/1
TABLET ORAL
Qty: 40 TABLET | Refills: 0 | Status: SHIPPED | OUTPATIENT
Start: 2021-12-10 | End: 2022-01-12 | Stop reason: SDUPTHER

## 2021-12-10 NOTE — TELEPHONE ENCOUNTER
"----- Message from Katarina Phillips sent at 12/10/2021 10:33 AM EST -----  Regarding: Have Changed to New Medical Plan/Drug 2022  Greetings Dr. REED,    To save money, Carlos and I are joining \"[facility] [phone] [facility]ru [place]\" Would you be so kind as to place all our prescriptions through this supplier.      Also, I have stopped taking the Gabapentin, I tried it for two weeks and it made my ankles and feet swell terribly.  Once I stopped taking it, I returned to normal..  Thank you for all you do, Kathryn Lucas has made you something for Estrella, and we will be stopping by soon.   Our best to you and your family!    Katarina Phillips  [phone]  "

## 2021-12-17 RX ORDER — LOSARTAN POTASSIUM 25 MG/1
25 TABLET ORAL DAILY
Qty: 90 TABLET | Refills: 3 | Status: SHIPPED | OUTPATIENT
Start: 2021-12-17 | End: 2022-02-04

## 2021-12-17 RX ORDER — MELATONIN
1000 DAILY
Qty: 90 TABLET | Refills: 3 | Status: SHIPPED | OUTPATIENT
Start: 2021-12-17 | End: 2022-03-10 | Stop reason: SDUPTHER

## 2021-12-17 RX ORDER — AMLODIPINE BESYLATE 10 MG/1
10 TABLET ORAL DAILY
Qty: 90 TABLET | Refills: 3 | Status: SHIPPED | OUTPATIENT
Start: 2021-12-17 | End: 2022-03-10 | Stop reason: SDUPTHER

## 2021-12-17 RX ORDER — LANOLIN ALCOHOL/MO/W.PET/CERES
1000 CREAM (GRAM) TOPICAL DAILY
Qty: 90 TABLET | Refills: 3 | Status: SHIPPED | OUTPATIENT
Start: 2021-12-17 | End: 2022-03-10 | Stop reason: SDUPTHER

## 2021-12-23 DIAGNOSIS — M54.50 LUMBAR SPINE PAIN: Primary | ICD-10-CM

## 2021-12-23 DIAGNOSIS — M16.11 PRIMARY OSTEOARTHRITIS OF RIGHT HIP: ICD-10-CM

## 2021-12-23 DIAGNOSIS — M54.16 LUMBAR RADICULAR PAIN: ICD-10-CM

## 2021-12-23 DIAGNOSIS — S52.501D UNSPECIFIED FRACTURE OF THE LOWER END OF RIGHT RADIUS, SUBSEQUENT ENCOUNTER FOR CLOSED FRACTURE WITH ROUTINE HEALING: ICD-10-CM

## 2021-12-23 RX ORDER — HYDROCODONE BITARTRATE AND ACETAMINOPHEN 10; 325 MG/1; MG/1
1 TABLET ORAL EVERY 6 HOURS PRN
Qty: 120 TABLET | Refills: 0 | Status: SHIPPED | OUTPATIENT
Start: 2021-12-23 | End: 2022-02-08 | Stop reason: SDUPTHER

## 2021-12-23 NOTE — TELEPHONE ENCOUNTER
----- Message from Katarina Phillips sent at 12/23/2021 12:45 PM EST -----  Regarding: Refill on Pain Medication  Greetings Dr. REED,   Hope all is well, I have been trying hard not to over use the pain medication, however, I will be totally out in 5 days.  If you would be so kind as to refill the prescription it would be greatly appreciated.  I will not be seeing the Neurologists to complete the nerve study, until March 9, 2022.    Roshni De La Rosa to you and the family..  Katarina Phillips  [phone]

## 2022-01-05 DIAGNOSIS — E55.9 VITAMIN D DEFICIENCY: Primary | ICD-10-CM

## 2022-01-05 DIAGNOSIS — R00.2 PALPITATIONS: ICD-10-CM

## 2022-01-05 DIAGNOSIS — E53.8 VITAMIN B12 DEFICIENCY: ICD-10-CM

## 2022-01-05 DIAGNOSIS — I48.0 PAROXYSMAL ATRIAL FIBRILLATION: ICD-10-CM

## 2022-01-05 DIAGNOSIS — I10 ESSENTIAL HYPERTENSION: ICD-10-CM

## 2022-01-05 DIAGNOSIS — E78.2 MIXED HYPERLIPIDEMIA: ICD-10-CM

## 2022-01-12 RX ORDER — MELOXICAM 7.5 MG/1
7.5 TABLET ORAL DAILY
Qty: 90 TABLET | Refills: 0 | Status: SHIPPED | OUTPATIENT
Start: 2022-01-12 | End: 2022-02-02 | Stop reason: SDUPTHER

## 2022-02-02 DIAGNOSIS — M25.551 RIGHT HIP PAIN: Primary | ICD-10-CM

## 2022-02-03 RX ORDER — MELOXICAM 7.5 MG/1
7.5 TABLET ORAL DAILY
Qty: 90 TABLET | Refills: 0 | Status: SHIPPED | OUTPATIENT
Start: 2022-02-03 | End: 2022-09-27

## 2022-02-03 NOTE — PROGRESS NOTES
EMG and Nerve Conduction Studies    The complete report includes the data sheets.     Date of Study: 2/8/22    Referring Provider:Dr. Frank    History: This patient is a 66-year-old female with history of cervical spine surgery several years ago.  She reports approximately 6 months ago she developed acute pain in the neck radiating into the shoulder and down the arms more on the right than the left.  She reports this was excruciating to the point she was unable to use the arms.   She reports she continues to have pain though this is managed somewhat with narcotic medications.  On examination there is no significant focal muscle atrophy and reflexes are 3+ in the biceps and triceps.    Results:    1.  The right median sensory distal latency is mildly prolonged the right median motor nerve conduction study was normal.  The left median sensory motor nerve conduction studies were normal.    2.  The ulnar sensory and motor nerve conduction studies were normal bilaterally.    3.  EMG of the muscles examined in the bilateral C5-T1 myotomes including the paraspinal muscles showed no fibrillations or positive sharp waves with essentially normal insertional activity.  On the right there is some chronic neurogenic changes in the deltoid and biceps muscle with recruitment proportional to effort which was limited by complaints of pain.      Impression:    This is an abnormal study.  There is evidence of mild possibly asymptomatic right median neuropathy at the wrist.  There is no evidence of left median neuropathy or focal ulnar neuropathy.  On electromyography there were no acute neurogenic changes in any muscle there is no fibrillations or positive sharp waves with normal insertional activity.  There were some mild chronic neurogenic changes predominantly in the right C5-6 myotome.      Electronically signed by :    Joseph Seipel, M.D.  February 8, 2022

## 2022-02-04 DIAGNOSIS — I48.0 PAROXYSMAL ATRIAL FIBRILLATION: ICD-10-CM

## 2022-02-04 DIAGNOSIS — I10 ESSENTIAL HYPERTENSION: ICD-10-CM

## 2022-02-04 DIAGNOSIS — E78.2 MIXED HYPERLIPIDEMIA: ICD-10-CM

## 2022-02-04 DIAGNOSIS — R00.2 PALPITATIONS: ICD-10-CM

## 2022-02-04 RX ORDER — LOSARTAN POTASSIUM 100 MG/1
100 TABLET ORAL DAILY
Qty: 30 TABLET | Refills: 6 | Status: SHIPPED | OUTPATIENT
Start: 2022-02-04 | End: 2022-02-07 | Stop reason: SDUPTHER

## 2022-02-04 RX ORDER — HYDROCHLOROTHIAZIDE 12.5 MG/1
12.5 TABLET ORAL DAILY
Qty: 30 TABLET | Refills: 11 | Status: SHIPPED | OUTPATIENT
Start: 2022-02-04 | End: 2022-03-10 | Stop reason: SDUPTHER

## 2022-02-07 DIAGNOSIS — E78.2 MIXED HYPERLIPIDEMIA: ICD-10-CM

## 2022-02-07 DIAGNOSIS — I10 ESSENTIAL HYPERTENSION: ICD-10-CM

## 2022-02-07 DIAGNOSIS — I48.0 PAROXYSMAL ATRIAL FIBRILLATION: ICD-10-CM

## 2022-02-07 DIAGNOSIS — R00.2 PALPITATIONS: ICD-10-CM

## 2022-02-07 RX ORDER — LOSARTAN POTASSIUM 100 MG/1
100 TABLET ORAL DAILY
Qty: 90 TABLET | Refills: 1 | Status: SHIPPED | OUTPATIENT
Start: 2022-02-07 | End: 2022-02-11 | Stop reason: SDUPTHER

## 2022-02-08 ENCOUNTER — TELEPHONE (OUTPATIENT)
Dept: FAMILY MEDICINE CLINIC | Facility: CLINIC | Age: 67
End: 2022-02-08

## 2022-02-08 ENCOUNTER — PROCEDURE VISIT (OUTPATIENT)
Dept: NEUROLOGY | Facility: CLINIC | Age: 67
End: 2022-02-08

## 2022-02-08 VITALS
TEMPERATURE: 98 F | WEIGHT: 166 LBS | BODY MASS INDEX: 30.55 KG/M2 | HEIGHT: 62 IN | HEART RATE: 80 BPM | DIASTOLIC BLOOD PRESSURE: 85 MMHG | SYSTOLIC BLOOD PRESSURE: 130 MMHG

## 2022-02-08 DIAGNOSIS — M79.602 PAIN IN BOTH UPPER EXTREMITIES: ICD-10-CM

## 2022-02-08 DIAGNOSIS — Z98.890 S/P CERVICAL DISCECTOMY: ICD-10-CM

## 2022-02-08 DIAGNOSIS — M79.601 PAIN IN BOTH UPPER EXTREMITIES: ICD-10-CM

## 2022-02-08 DIAGNOSIS — M54.50 LUMBAR SPINE PAIN: ICD-10-CM

## 2022-02-08 DIAGNOSIS — S52.501D UNSPECIFIED FRACTURE OF THE LOWER END OF RIGHT RADIUS, SUBSEQUENT ENCOUNTER FOR CLOSED FRACTURE WITH ROUTINE HEALING: ICD-10-CM

## 2022-02-08 DIAGNOSIS — M16.11 PRIMARY OSTEOARTHRITIS OF RIGHT HIP: ICD-10-CM

## 2022-02-08 DIAGNOSIS — M54.16 LUMBAR RADICULAR PAIN: ICD-10-CM

## 2022-02-08 DIAGNOSIS — M54.2 NECK PAIN, BILATERAL: Primary | ICD-10-CM

## 2022-02-08 PROCEDURE — 95910 NRV CNDJ TEST 7-8 STUDIES: CPT | Performed by: PSYCHIATRY & NEUROLOGY

## 2022-02-08 PROCEDURE — 95886 MUSC TEST DONE W/N TEST COMP: CPT | Performed by: PSYCHIATRY & NEUROLOGY

## 2022-02-08 RX ORDER — HYDROCODONE BITARTRATE AND ACETAMINOPHEN 10; 325 MG/1; MG/1
1 TABLET ORAL EVERY 6 HOURS PRN
Qty: 120 TABLET | Refills: 0 | Status: SHIPPED | OUTPATIENT
Start: 2022-02-08 | End: 2022-03-21 | Stop reason: SDUPTHER

## 2022-02-08 NOTE — TELEPHONE ENCOUNTER
----- Message from Katarina Phillips sent at 2/8/2022  2:19 PM EST -----  Regarding: High Blood Pressure/Pain Meds  Greetings Dr. REED,  The meds are working, my blood pressure was down to 139....  Thank you so much.    Also, I am just about out of pain meds could you refill my prescription, I have enough for 3 or 4 days.    Best Regards,  Katarina Phillips  [phone]

## 2022-02-09 ENCOUNTER — TELEPHONE (OUTPATIENT)
Dept: FAMILY MEDICINE CLINIC | Facility: CLINIC | Age: 67
End: 2022-02-09

## 2022-02-09 DIAGNOSIS — I48.0 PAROXYSMAL ATRIAL FIBRILLATION: ICD-10-CM

## 2022-02-09 DIAGNOSIS — R00.2 PALPITATIONS: ICD-10-CM

## 2022-02-09 DIAGNOSIS — I10 ESSENTIAL HYPERTENSION: ICD-10-CM

## 2022-02-09 DIAGNOSIS — E78.2 MIXED HYPERLIPIDEMIA: ICD-10-CM

## 2022-02-09 NOTE — TELEPHONE ENCOUNTER
Pharmacy Name:  [facility]     Pharmacy representative name: [facility]    Pharmacy representative phone number: [phone]    What medication are you calling in regards to: LOSARTAN 25 MG       What question does the pharmacy have:    RECEIVED IN December, THE PATIENT HAS GOTTEN LOSARTAN 100 MG AT THE LOCAL PHARMACY ON 2/4/2022, PHARMACY IS WANTING TO KNOW IF THIS AN UPDATED PRESCRIPTION       PRESCRIPTION WILL BE ON HOLD UNTIL OFFICE CONTACTS VANGIE    Who is the provider that prescribed the medication:

## 2022-02-11 RX ORDER — LOSARTAN POTASSIUM 100 MG/1
100 TABLET ORAL DAILY
Qty: 90 TABLET | Refills: 1 | Status: SHIPPED | OUTPATIENT
Start: 2022-02-11 | End: 2022-03-10 | Stop reason: SDUPTHER

## 2022-03-02 ENCOUNTER — TELEPHONE (OUTPATIENT)
Dept: NEUROLOGY | Facility: OTHER | Age: 67
End: 2022-03-02

## 2022-03-02 NOTE — TELEPHONE ENCOUNTER
Patients spouse called to verify appt details. However patient did not check of appointment box to release information about appointments to anyone. I told him she would have to call us back.

## 2022-03-10 DIAGNOSIS — I10 ESSENTIAL HYPERTENSION: ICD-10-CM

## 2022-03-10 DIAGNOSIS — M25.551 RIGHT HIP PAIN: ICD-10-CM

## 2022-03-10 DIAGNOSIS — E78.2 MIXED HYPERLIPIDEMIA: ICD-10-CM

## 2022-03-10 DIAGNOSIS — R00.2 PALPITATIONS: ICD-10-CM

## 2022-03-10 DIAGNOSIS — I48.0 PAROXYSMAL ATRIAL FIBRILLATION: ICD-10-CM

## 2022-03-10 RX ORDER — HYDROCHLOROTHIAZIDE 12.5 MG/1
12.5 TABLET ORAL DAILY
Qty: 30 TABLET | Refills: 11 | Status: SHIPPED | OUTPATIENT
Start: 2022-03-10 | End: 2022-03-21 | Stop reason: SDUPTHER

## 2022-03-10 RX ORDER — LANOLIN ALCOHOL/MO/W.PET/CERES
1000 CREAM (GRAM) TOPICAL DAILY
Qty: 90 TABLET | Refills: 3 | Status: SHIPPED | OUTPATIENT
Start: 2022-03-10 | End: 2022-12-07

## 2022-03-10 RX ORDER — LOSARTAN POTASSIUM 100 MG/1
100 TABLET ORAL DAILY
Qty: 90 TABLET | Refills: 3 | Status: SHIPPED | OUTPATIENT
Start: 2022-03-10 | End: 2022-11-27 | Stop reason: SDUPTHER

## 2022-03-10 RX ORDER — AMLODIPINE BESYLATE 10 MG/1
10 TABLET ORAL DAILY
Qty: 90 TABLET | Refills: 3 | Status: SHIPPED | OUTPATIENT
Start: 2022-03-10

## 2022-03-10 RX ORDER — MELATONIN
1000 DAILY
Qty: 90 TABLET | Refills: 3 | Status: SHIPPED | OUTPATIENT
Start: 2022-03-10 | End: 2022-12-07

## 2022-03-10 NOTE — TELEPHONE ENCOUNTER
Patient is very unhappy with [place] mail-order     We are transferring all medications to [facility]    Also will look into getting Eliquis paid for thru patient assist

## 2022-03-21 DIAGNOSIS — M54.50 LUMBAR SPINE PAIN: ICD-10-CM

## 2022-03-21 DIAGNOSIS — M54.16 LUMBAR RADICULAR PAIN: ICD-10-CM

## 2022-03-21 DIAGNOSIS — S52.501D UNSPECIFIED FRACTURE OF THE LOWER END OF RIGHT RADIUS, SUBSEQUENT ENCOUNTER FOR CLOSED FRACTURE WITH ROUTINE HEALING: ICD-10-CM

## 2022-03-21 DIAGNOSIS — M16.11 PRIMARY OSTEOARTHRITIS OF RIGHT HIP: ICD-10-CM

## 2022-03-21 NOTE — TELEPHONE ENCOUNTER
"----- Message from Katarina Phillips sent at 3/21/2022  8:18 AM EDT -----  Regarding: Greetings Dr. REED, Rhoda  I would like to refill my pain medication (hydrocod/acetam) and hydrochlorthiazide.  If you would be so kind  as to send them both to the \"[facility] in [place].  It is greatly appreciated.    Best Regards,  Katarina Phillips  "

## 2022-03-22 RX ORDER — HYDROCODONE BITARTRATE AND ACETAMINOPHEN 10; 325 MG/1; MG/1
1 TABLET ORAL EVERY 6 HOURS PRN
Qty: 120 TABLET | Refills: 0 | Status: SHIPPED | OUTPATIENT
Start: 2022-03-22 | End: 2022-04-28 | Stop reason: SDUPTHER

## 2022-03-22 RX ORDER — HYDROCHLOROTHIAZIDE 12.5 MG/1
12.5 TABLET ORAL DAILY
Qty: 30 TABLET | Refills: 11 | Status: SHIPPED | OUTPATIENT
Start: 2022-03-22 | End: 2022-12-07

## 2022-04-28 DIAGNOSIS — M47.812 FACET ARTHROPATHY, CERVICAL: Primary | ICD-10-CM

## 2022-04-28 DIAGNOSIS — M16.11 PRIMARY OSTEOARTHRITIS OF RIGHT HIP: ICD-10-CM

## 2022-04-28 DIAGNOSIS — M54.16 LUMBAR RADICULAR PAIN: ICD-10-CM

## 2022-04-28 DIAGNOSIS — M54.50 LUMBAR SPINE PAIN: ICD-10-CM

## 2022-04-28 DIAGNOSIS — S52.501D UNSPECIFIED FRACTURE OF THE LOWER END OF RIGHT RADIUS, SUBSEQUENT ENCOUNTER FOR CLOSED FRACTURE WITH ROUTINE HEALING: ICD-10-CM

## 2022-04-28 DIAGNOSIS — M47.22 RADICULOPATHY DUE TO CERVICAL SPONDYLOSIS AT MULTIPLE LEVELS: ICD-10-CM

## 2022-04-28 RX ORDER — FLUOXETINE HYDROCHLORIDE 20 MG/1
CAPSULE ORAL
COMMUNITY
Start: 2022-03-06 | End: 2022-06-04

## 2022-04-28 RX ORDER — DEXAMETHASONE 6 MG/1
TABLET ORAL
Qty: 25 TABLET | Refills: 0 | Status: SHIPPED | OUTPATIENT
Start: 2022-04-28 | End: 2022-05-08

## 2022-04-28 RX ORDER — HYDROCODONE BITARTRATE AND ACETAMINOPHEN 10; 325 MG/1; MG/1
1 TABLET ORAL EVERY 6 HOURS PRN
Qty: 120 TABLET | Refills: 0 | Status: SHIPPED | OUTPATIENT
Start: 2022-04-28 | End: 2022-06-08 | Stop reason: SDUPTHER

## 2022-05-07 ENCOUNTER — HOSPITAL ENCOUNTER (EMERGENCY)
Facility: HOSPITAL | Age: 67
Discharge: LEFT BEFORE DISCHARGE | End: 2022-05-07

## 2022-05-07 PROCEDURE — 99211 OFF/OP EST MAY X REQ PHY/QHP: CPT

## 2022-05-16 ENCOUNTER — OFFICE VISIT (OUTPATIENT)
Dept: CARDIOLOGY | Facility: CLINIC | Age: 67
End: 2022-05-16

## 2022-05-16 VITALS
BODY MASS INDEX: 30.36 KG/M2 | DIASTOLIC BLOOD PRESSURE: 117 MMHG | OXYGEN SATURATION: 93 % | HEIGHT: 62 IN | HEART RATE: 88 BPM | SYSTOLIC BLOOD PRESSURE: 145 MMHG | WEIGHT: 165 LBS

## 2022-05-16 DIAGNOSIS — I10 ESSENTIAL HYPERTENSION: ICD-10-CM

## 2022-05-16 DIAGNOSIS — Z86.79 STATUS POST ABLATION OF ATRIAL FIBRILLATION: ICD-10-CM

## 2022-05-16 DIAGNOSIS — Z98.890 STATUS POST ABLATION OF ATRIAL FIBRILLATION: ICD-10-CM

## 2022-05-16 DIAGNOSIS — I48.0 PAROXYSMAL ATRIAL FIBRILLATION: Primary | ICD-10-CM

## 2022-05-16 DIAGNOSIS — I48.92 ATRIAL FLUTTER WITH RAPID VENTRICULAR RESPONSE: ICD-10-CM

## 2022-05-16 PROCEDURE — 99214 OFFICE O/P EST MOD 30 MIN: CPT | Performed by: INTERNAL MEDICINE

## 2022-05-16 PROCEDURE — 93000 ELECTROCARDIOGRAM COMPLETE: CPT | Performed by: INTERNAL MEDICINE

## 2022-05-16 NOTE — PROGRESS NOTES
CC--recurrent atrial flutter post ablation, viral gastroenteritis      Subject  67-year-old female patient had a viral gastroenteritis and uncontrollable atrial flutter and underwent right atrial flutter ablation with termination with early recurrence and placed on the amiodarone .She had a prior history of hypertension and hyperthyroidism  TSH in the hospital within normal range  No syncope  Patient has recurrent arrhythmia and complains of atypical pain and underwent cardiac catheterization which showed no significant obstructive artery disease and subsequently underwent pulmonary vein isolation and redo right atrial flutter ablation and comes in for follow-up and denies any complains of palpitations  C/O fatigue     Past Medical History:   Diagnosis Date   • Hypertension    • Hyperthyroidism      Past Surgical History:   Procedure Laterality Date   • BACK SURGERY     • CARDIAC ELECTROPHYSIOLOGY PROCEDURE N/A 4/14/2021    Procedure: EP/Ablation;  Surgeon: Ori Tran MD;  Location: [facility] INVASIVE LOCATION;  Service: Cardiovascular;  Laterality: N/A;       Review of Systems   General:  positive for fatigue and tiredness  Eyes: No redness  Cardiovascular: No chest pain,  palpitations  Respiratory:   no shortness of breath        Physical Exam  VITALS REVIEWED    General:      well developed, well nourished, in no acute distress.    Head:      normocephalic and atraumatic.    Eyes:      PERRL/EOM intact, conjunctiva and sclera clear with out nystagmus.    Neck:      no masses, thyromegaly,  trachea central with normal respiratory effort   Lungs:      clear bilaterally to auscultation.    Heart:       Rhythm without murmurs or gallops        Assessment and plan    Counterclockwise right atrial flutter status post complex ablation needing extensive ablation  with recurrence--associated with atrial fibrillation driving atrial flutter status post pulmonary vein isolation and redo right atrial flutter  ablation with resolution of symptoms of arrhythmias  Atypical chest pain presentation with cardiac catheterization showing without coronary artery stenosis  Hypertension  well controlled--repeat BP--147/78  Gastroenteritis resolved  MS problems--post MRI--followed by pain clinic  Clinically doing well   meds reviewed  Chadsvasc score--3  Follow up in 6 months      ECG 12 Lead    Date/Time: 5/16/2022 9:11 AM  Performed by: Ori Tran MD  Authorized by: Ori Tran MD   Comparison: compared with previous ECG   Similar to previous ECG  Rhythm: sinus rhythm  Rate: normal  Conduction: conduction normal  QRS axis: normal            Electronically signed by Ori Tran MD, 05/16/22, 9:11 AM EDT.

## 2022-06-02 NOTE — PROGRESS NOTES
"CHIEF COMPLAINT  Neck Pain      Subjective   Katarina Phillips is a 67 y.o. female who was referred by Andrews Victoria MD to our pain management clinic for consultation, evaluation and treatment of neck apain and cervical facet joint injections. Patient has long history of back and neck pain with chronic left-sided foot drop and had multiple surgeries in the 1980s.  In 2006, she had cervical discectomy for 3 levels. She had significant pain since surgery.  R sided weakness since surgery.  She was previously on opioids and wean herself off of them.  Patient had significant pain from [id number]. She had retired from her [employer] job in 2021. States that all of a sudden she starting having weakness and pain in arms after COVID and episodes of A Fib. Arms feel like \"wet noodles.\" She was restarted on pain medications with which she was able to do her daily activities.Her pain has significantly worsens again since 2021.  Was also evaluated by Ortho for possible R hip pathology- it has improved since.  She has tried oral steroid therapy, Norco, Mobic, Medrol Dosepak, Robaxin, tramadol, physical therapy, LEXI previously.  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 EMG - WNL.     Neck pain is 7/10 on VAS, at maximum is 9/10. Pain is sharp and stabbing in nature. Pain is referred b/l tripces and shoulders (not worse one side than the other), unable to wear bra due to pain. The pain is constant. The pain is improved by pain meds. The pain is worse with turning her neck, using her arms.    PHQ-9- 6  SOAPP- 3  [place] disability Scale- 91 - takes care of 5 year old great grand daughter and .     PMH:   History of lumbar surgery (Dr. Durant-1988), cervical discectomy (2006) paroxysmal A. fib, hypertension, chronic left foot drop, anxiety, hip pain, tremor, R wrist surgery- 2018    Current " Medications:    Norco  mg q6H PRN  Meloxicam 7.5 mg daily as needed  Robaxin 500 mg QID PRN  Eliquis 5 mg      Past Medications:  Percocet  mg QID  Morphine ER 60 mg   Tramadol   Robaxin  Gabapentin  Lyrica     Past Modalities:  TENS:       yes          Physical Therapy Within The Last 6 Months     No (2014)  Psychotherapy     no  Massage Therapy      no    Patient Complains Of:  Uro-Fecal Incontinence No (urgency)  Weight Gain/Loss  Yes  (20 lbs- 2021 since residential)  Fever/Chills   no  Weakness   yes      PEG Assessment   What number best describes your pain on average in the past week?7  What number best describes how, during the past week, pain has interfered with your enjoyment of life?7  What number best describes how, during the past week, pain has interfered with your general activity?  7        Current Outpatient Medications:   •  amLODIPine (NORVASC) 10 MG tablet, Take 1 tablet by mouth Daily., Disp: 90 tablet, Rfl: 3  •  apixaban (ELIQUIS) 5 MG tablet tablet, Take 1 tablet by mouth Every 12 (Twelve) Hours. Indications: Atrial Fibrillation, Disp: 180 tablet, Rfl: 3  •  cholecalciferol (VITAMIN D3) 25 MCG (1000 UT) tablet, Take 1 tablet by mouth Daily., Disp: 90 tablet, Rfl: 3  •  FLUoxetine (PROzac) 20 MG capsule, TAKE 3 CAPSULES BY MOUTH ONCE DAILY, Disp: 270 capsule, Rfl: 0  •  HYDROcodone-acetaminophen (NORCO)  MG per tablet, Take 1 tablet by mouth Every 6 (Six) Hours As Needed for Moderate Pain ., Disp: 120 tablet, Rfl: 0  •  losartan (COZAAR) 100 MG tablet, Take 1 tablet by mouth Daily for 90 days. Cancel the 25mg tablet on her profile please, Disp: 90 tablet, Rfl: 3  •  meloxicam (MOBIC) 7.5 MG tablet, Take 1 tablet by mouth Daily., Disp: 90 tablet, Rfl: 0  •  nitroglycerin (NITROSTAT) 0.4 MG SL tablet, Place 0.4 mg under the tongue Every 5 (Five) Minutes As Needed. do not exceed a total of 3 doses in 15 minutes, Disp: , Rfl:   •  vitamin B-12 (CYANOCOBALAMIN) 1000 MCG tablet,  Take 1 tablet by mouth Daily., Disp: 90 tablet, Rfl: 3  •  hydroCHLOROthiazide (HYDRODIURIL) 12.5 MG tablet, Take 1 tablet by mouth Daily for 30 days., Disp: 30 tablet, Rfl: 11    The following portions of the patient's history were reviewed and updated as appropriate: allergies, current medications, past family history, past medical history, past social history, past surgical history, and problem list.      REVIEW OF PERTINENT MEDICAL DATA    Past Medical History:   Diagnosis Date   • Abnormal ECG 2021   • Atrial fibrillation (HCC)    • Atrial flutter (HCC)    • COVID-19 2021   • Hyperlipidemia    • Hypertension    • Hyperthyroidism    • Low back pain    • Neck pain      Past Surgical History:   Procedure Laterality Date   • ABLATION OF DYSRHYTHMIC FOCUS  21- / 21   • BACK SURGERY     • CARDIAC CATHETERIZATION N/A 2021    Procedure: Left Heart Cath;  Surgeon: Olman Berg MD;  Location: [facility] CATH INVASIVE LOCATION;  Service: Cardiovascular;  Laterality: N/A;   • CARDIAC ELECTROPHYSIOLOGY PROCEDURE N/A 2021    Procedure: EP/Ablation;  Surgeon: Ori Tran MD;  Location:  [facility] CATH INVASIVE LOCATION;  Service: Cardiovascular;  Laterality: N/A;   • CARDIAC ELECTROPHYSIOLOGY PROCEDURE N/A 2021    Procedure: EP/Ablation;  Surgeon: Ori Tran MD;  Location: [facility] CATH INVASIVE LOCATION;  Service: Cardiovascular;  Laterality: N/A;   • CATARACT EXTRACTION     • NECK SURGERY     • ORTHOPEDIC SURGERY      mx foot breaks with surgical repair/wrist surgery     Family History   Problem Relation Age of Onset   • Stroke Mother    • Diabetes Mother    • No Known Problems Father      Social History     Socioeconomic History   • Marital status:    Tobacco Use   • Smoking status: Former Smoker     Packs/day: 1.00     Years: 12.00     Pack years: 12.00     Types: Cigarettes     Start date: 1980     Quit date: 1992     Years since quittin.1   • Smokeless  "tobacco: Never Used   • Tobacco comment: Have not smoked in over 30 years +   Vaping Use   • Vaping Use: Never used   Substance and Sexual Activity   • Alcohol use: Never   • Drug use: Not Currently     Frequency: 1.0 times per week     Types: Marijuana     Comment: Tried eatable once/to replace Opioid  use of 30 years   • Sexual activity: Not Currently     Partners: Male     Birth control/protection: None     Comment: Don't need birth control         Review of Systems   Musculoskeletal: Positive for arthralgias, back pain, neck pain and neck stiffness.         Vitals:    06/06/22 0830   BP: 142/60   Pulse: 89   Resp: 16   SpO2: 97%   Weight: 72.6 kg (160 lb)   Height: 162.6 cm (64\")   PainSc:   7         Objective   Physical Exam  Neck:     Musculoskeletal:         General: Tenderness present.        Arms:    Neurological:      Deep Tendon Reflexes:      Reflex Scores:       Tricep reflexes are 3+ on the right side and 3+ on the left side.       Bicep reflexes are 3+ on the right side and 3+ on the left side.       Brachioradialis reflexes are 3+ on the right side and 3+ on the left side.     Comments: Motor strength 4/5 b/l UE - more weakness with flexion  Sensory intact b/l UE             Imaging Reviewed:  EMG bilateral upper extremity-11/29/2021  - Normal EMG in bilateral C5-T1 myotomes.    MRI cervical spine with and without contrast-10/29/2021  - Q5-6-xomwynrtp facet arthropathy.  Mild left neural foraminal narrowing.  C4-5-right greater than left facet hypertrophy causing mild to moderate right neural foraminal narrowing.  C5-6-broad-based disc osteophyte complex resulting in mild indentation of the thecal sac and moderate right neuroforaminal narrowing and mild left neural foraminal narrowing.  C6-7-posterior disc protrusion with left greater than right facet arthropathy.  Moderate to severe left neural foraminal narrowing.  D5-W0-fluhvgsre facet hypertrophy causing mild bilateral neural foraminal " narrowing.    Assessment:    1. Cervical spondylosis    2. DDD (degenerative disc disease), cervical         Plan:   1. Defer UDS for now. Pain meds as per PCP for now. I can take over in future if needed/requested by PCP after UDS.   2.  Discussed with patient that her pain is multifactorial.  Patient has symptoms of myelopathy.  She has radicular pain symptoms bilaterally mostly in C7 dermatome.  MRI does show severe left neuroforaminal narrowing at C6-7 but does not explain right-sided symptoms.  She had cervical epidurals in the past from other physicians without much help.  She does have facet arthritis at multiple joints which could be cause of her axial neck pain.  3. Patient informed they would likely benefit from a medial branch block on bilateral from C5 to C7.  MRI shows facet arthritis at multiple levels. Facet tenderness is positive from C3 and C7. Patient informed the procedure is both therapeutic and diagnostic in nature.  The procedure was described in detail and the risks, benefits and alternatives were discussed with the patient (including but not limited to: bleeding, infection, nerve damage, worsening of pain, CSF leak, inability to perform injection, paralysis, seizures, and death) who agreed to proceed.  Discussed RFA at 70-80 degree for  sec if patient has good relief from 2 diagnostic MBB.  She is currently on Eliquis and needs to be off of it for 3 days before the injection.  Will obtain approval from Dr. Ramírez before proceeding with the procedure.  Discussed with patient that MBB injections will most likely only help her with axial neck pain and most likely not for the radicular pain.  Patient understood and agrees with the plan.      RTC for injection and then 3 week follow up.     Aiden Sanchez DO  Pain Management   [facility]         INSPECT REPORT    As part of the patient's treatment plan, I may be prescribing controlled substances. The patient has been made aware of  appropriate use of such medications, including potential risk of somnolence, limited ability to drive and/or work safely, and the potential for dependence or overdose. It has also been made clear that these medications are for use by this patient only, without concomitant use of alcohol or other substances unless prescribed.     Patient has completed prescribing agreement detailing terms of continued prescribing of controlled substances, including monitoring INSPECT reports, urine drug screening, and pill counts if necessary. The patient is aware that inappropriate use will results in cessation of prescribing such medications.    INSPECT report has been reviewed and scanned into the patient's chart.      EMR Dragon/Transcription Disclaimer:   Much of this encounter note is an electronic transcription/translation of spoken language to printed text. The electronic translation of spoken language may permit erroneous, or at times, nonsensical words or phrases to be inadvertently transcribed; Although I have reviewed the note for such errors, some may still exist.

## 2022-06-03 ENCOUNTER — OFFICE VISIT (OUTPATIENT)
Dept: FAMILY MEDICINE CLINIC | Facility: CLINIC | Age: 67
End: 2022-06-03

## 2022-06-03 ENCOUNTER — LAB (OUTPATIENT)
Dept: FAMILY MEDICINE CLINIC | Facility: CLINIC | Age: 67
End: 2022-06-03

## 2022-06-03 VITALS
DIASTOLIC BLOOD PRESSURE: 75 MMHG | OXYGEN SATURATION: 96 % | HEIGHT: 62 IN | BODY MASS INDEX: 30.59 KG/M2 | RESPIRATION RATE: 16 BRPM | SYSTOLIC BLOOD PRESSURE: 137 MMHG | WEIGHT: 166.2 LBS | TEMPERATURE: 97.5 F | HEART RATE: 87 BPM

## 2022-06-03 DIAGNOSIS — I48.92 ATRIAL FLUTTER WITH RAPID VENTRICULAR RESPONSE: ICD-10-CM

## 2022-06-03 DIAGNOSIS — I10 ESSENTIAL HYPERTENSION: ICD-10-CM

## 2022-06-03 DIAGNOSIS — M54.2 NECK PAIN, BILATERAL: Primary | ICD-10-CM

## 2022-06-03 DIAGNOSIS — R79.9 ABNORMAL FINDING OF BLOOD CHEMISTRY, UNSPECIFIED: ICD-10-CM

## 2022-06-03 DIAGNOSIS — G89.4 CHRONIC PAIN DISORDER: ICD-10-CM

## 2022-06-03 DIAGNOSIS — E55.9 VITAMIN D DEFICIENCY, UNSPECIFIED: ICD-10-CM

## 2022-06-03 DIAGNOSIS — E78.2 MIXED HYPERLIPIDEMIA: ICD-10-CM

## 2022-06-03 DIAGNOSIS — F34.1 DYSTHYMIA: ICD-10-CM

## 2022-06-03 LAB
25(OH)D3 SERPL-MCNC: 38.6 NG/ML (ref 30–100)
ALBUMIN SERPL-MCNC: 4 G/DL (ref 3.5–5.2)
ALBUMIN/GLOB SERPL: 1.3 G/DL
ALP SERPL-CCNC: 103 U/L (ref 39–117)
ALT SERPL W P-5'-P-CCNC: 23 U/L (ref 1–33)
ANION GAP SERPL CALCULATED.3IONS-SCNC: 12.8 MMOL/L (ref 5–15)
AST SERPL-CCNC: 22 U/L (ref 1–32)
BACTERIA UR QL AUTO: ABNORMAL /HPF
BASOPHILS # BLD AUTO: 0.07 10*3/MM3 (ref 0–0.2)
BASOPHILS NFR BLD AUTO: 0.8 % (ref 0–1.5)
BILIRUB SERPL-MCNC: 0.2 MG/DL (ref 0–1.2)
BILIRUB UR QL STRIP: NEGATIVE
BUN SERPL-MCNC: 11 MG/DL (ref 8–23)
BUN/CREAT SERPL: 18.6 (ref 7–25)
CALCIUM SPEC-SCNC: 9 MG/DL (ref 8.6–10.5)
CHLORIDE SERPL-SCNC: 100 MMOL/L (ref 98–107)
CHOLEST SERPL-MCNC: 247 MG/DL (ref 0–200)
CLARITY UR: CLEAR
CO2 SERPL-SCNC: 24.2 MMOL/L (ref 22–29)
COLOR UR: YELLOW
CREAT SERPL-MCNC: 0.59 MG/DL (ref 0.57–1)
DEPRECATED RDW RBC AUTO: 43.7 FL (ref 37–54)
EGFRCR SERPLBLD CKD-EPI 2021: 98.9 ML/MIN/1.73
EOSINOPHIL # BLD AUTO: 0.14 10*3/MM3 (ref 0–0.4)
EOSINOPHIL NFR BLD AUTO: 1.5 % (ref 0.3–6.2)
ERYTHROCYTE [DISTWIDTH] IN BLOOD BY AUTOMATED COUNT: 13.7 % (ref 12.3–15.4)
GLOBULIN UR ELPH-MCNC: 3.2 GM/DL
GLUCOSE SERPL-MCNC: 103 MG/DL (ref 65–99)
GLUCOSE UR STRIP-MCNC: NEGATIVE MG/DL
HBA1C MFR BLD: 5.9 % (ref 3.5–5.6)
HCT VFR BLD AUTO: 42.6 % (ref 34–46.6)
HDLC SERPL-MCNC: 56 MG/DL (ref 40–60)
HGB BLD-MCNC: 13.9 G/DL (ref 12–15.9)
HGB UR QL STRIP.AUTO: NEGATIVE
HYALINE CASTS UR QL AUTO: ABNORMAL /LPF
IMM GRANULOCYTES # BLD AUTO: 0.05 10*3/MM3 (ref 0–0.05)
IMM GRANULOCYTES NFR BLD AUTO: 0.6 % (ref 0–0.5)
KETONES UR QL STRIP: NEGATIVE
LDLC SERPL CALC-MCNC: 167 MG/DL (ref 0–100)
LDLC/HDLC SERPL: 2.93 {RATIO}
LEUKOCYTE ESTERASE UR QL STRIP.AUTO: ABNORMAL
LYMPHOCYTES # BLD AUTO: 1.74 10*3/MM3 (ref 0.7–3.1)
LYMPHOCYTES NFR BLD AUTO: 19.2 % (ref 19.6–45.3)
MCH RBC QN AUTO: 28.4 PG (ref 26.6–33)
MCHC RBC AUTO-ENTMCNC: 32.6 G/DL (ref 31.5–35.7)
MCV RBC AUTO: 86.9 FL (ref 79–97)
MONOCYTES # BLD AUTO: 0.78 10*3/MM3 (ref 0.1–0.9)
MONOCYTES NFR BLD AUTO: 8.6 % (ref 5–12)
NEUTROPHILS NFR BLD AUTO: 6.28 10*3/MM3 (ref 1.7–7)
NEUTROPHILS NFR BLD AUTO: 69.3 % (ref 42.7–76)
NITRITE UR QL STRIP: NEGATIVE
NRBC BLD AUTO-RTO: 0 /100 WBC (ref 0–0.2)
PH UR STRIP.AUTO: 6 [PH] (ref 5–8)
PLATELET # BLD AUTO: 482 10*3/MM3 (ref 140–450)
PMV BLD AUTO: 10.7 FL (ref 6–12)
POTASSIUM SERPL-SCNC: 3.6 MMOL/L (ref 3.5–5.2)
PROT SERPL-MCNC: 7.2 G/DL (ref 6–8.5)
PROT UR QL STRIP: NEGATIVE
RBC # BLD AUTO: 4.9 10*6/MM3 (ref 3.77–5.28)
RBC # UR STRIP: ABNORMAL /HPF
REF LAB TEST METHOD: ABNORMAL
SODIUM SERPL-SCNC: 137 MMOL/L (ref 136–145)
SP GR UR STRIP: 1.02 (ref 1–1.03)
SQUAMOUS #/AREA URNS HPF: ABNORMAL /HPF
T4 FREE SERPL-MCNC: 1.11 NG/DL (ref 0.93–1.7)
TRIGL SERPL-MCNC: 135 MG/DL (ref 0–150)
TSH SERPL DL<=0.05 MIU/L-ACNC: 1.28 UIU/ML (ref 0.27–4.2)
UROBILINOGEN UR QL STRIP: ABNORMAL
VIT B12 BLD-MCNC: 1643 PG/ML (ref 211–946)
VLDLC SERPL-MCNC: 24 MG/DL (ref 5–40)
WBC # UR STRIP: ABNORMAL /HPF
WBC NRBC COR # BLD: 9.06 10*3/MM3 (ref 3.4–10.8)

## 2022-06-03 PROCEDURE — 81001 URINALYSIS AUTO W/SCOPE: CPT | Performed by: FAMILY MEDICINE

## 2022-06-03 PROCEDURE — 84439 ASSAY OF FREE THYROXINE: CPT | Performed by: FAMILY MEDICINE

## 2022-06-03 PROCEDURE — 84443 ASSAY THYROID STIM HORMONE: CPT | Performed by: FAMILY MEDICINE

## 2022-06-03 PROCEDURE — 99214 OFFICE O/P EST MOD 30 MIN: CPT | Performed by: FAMILY MEDICINE

## 2022-06-03 PROCEDURE — 85025 COMPLETE CBC W/AUTO DIFF WBC: CPT | Performed by: FAMILY MEDICINE

## 2022-06-03 PROCEDURE — 82306 VITAMIN D 25 HYDROXY: CPT | Performed by: FAMILY MEDICINE

## 2022-06-03 PROCEDURE — 36415 COLL VENOUS BLD VENIPUNCTURE: CPT | Performed by: FAMILY MEDICINE

## 2022-06-03 PROCEDURE — 83036 HEMOGLOBIN GLYCOSYLATED A1C: CPT | Performed by: FAMILY MEDICINE

## 2022-06-03 PROCEDURE — 80053 COMPREHEN METABOLIC PANEL: CPT | Performed by: FAMILY MEDICINE

## 2022-06-03 PROCEDURE — 82607 VITAMIN B-12: CPT | Performed by: FAMILY MEDICINE

## 2022-06-03 PROCEDURE — 80061 LIPID PANEL: CPT | Performed by: FAMILY MEDICINE

## 2022-06-03 RX ORDER — NITROGLYCERIN 0.4 MG/1
0.4 TABLET SUBLINGUAL
COMMUNITY
Start: 2022-05-11

## 2022-06-03 RX ORDER — DIAZEPAM 5 MG/1
5 TABLET ORAL DAILY
Qty: 10 TABLET | Refills: 2 | Status: SHIPPED | OUTPATIENT
Start: 2022-06-03 | End: 2022-06-06
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
## 2022-06-03 NOTE — ASSESSMENT & PLAN NOTE
Katarina is a 67-year-old who has had multiple back surgeries and discectomies. She has had a total of 9 discectomies and one surgery on her back to remove 3 discs. She has severe facet arthropathy in the neck and she cannot move her arms above shoulder height because of increased pain in the center of her neck and in her traps bilaterally. She has weakness and intense pain when she brings her elbows up to shoulder height. She has no strength, her  is weak, but symmetric. She has reflexes in both arms, but the right reflexes are down compared to the left. The patient is going to be seeing pain management in a week or so and we are going to see about maybe some facet blocks to help her. In the meantime, she is so active taking care of Carlos, who is her , who is ill and taking care of her grandchild who they are fighting for custody that as she is under a lot of stress there too. The pain medicine allows her to function at a high level of physical activity that she needs right now. She has never shown signs of abusing this medicine or taking inappropriately.

## 2022-06-03 NOTE — ASSESSMENT & PLAN NOTE
- The patient also has low back pain, but right now we are dealing mainly with the neck pain as related above.
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
## 2022-06-03 NOTE — ASSESSMENT & PLAN NOTE
The patient has a history of heart disease. Mainly in the form of atrial flutter with a rapid ventricular response. She does get chest pain at times and takes nitro. She has had a cardiac cath and as best I can tell from her records, does not have obstructive coronary artery disease, but I want her a cardiologist to clarify that for us.

## 2022-06-04 RX ORDER — FLUOXETINE HYDROCHLORIDE 20 MG/1
CAPSULE ORAL
Qty: 270 CAPSULE | Refills: 0 | Status: SHIPPED | OUTPATIENT
Start: 2022-06-04 | End: 2022-09-02

## 2022-06-04 NOTE — PROGRESS NOTES
I need to talk to Katarina about may be going on a statin for her cholesterol panel.  We will see how she feels about that after we discussed are lab results.

## 2022-06-06 ENCOUNTER — OFFICE VISIT (OUTPATIENT)
Dept: PAIN MEDICINE | Facility: CLINIC | Age: 67
End: 2022-06-06

## 2022-06-06 VITALS
HEIGHT: 64 IN | SYSTOLIC BLOOD PRESSURE: 142 MMHG | RESPIRATION RATE: 16 BRPM | DIASTOLIC BLOOD PRESSURE: 60 MMHG | WEIGHT: 160 LBS | OXYGEN SATURATION: 97 % | BODY MASS INDEX: 27.31 KG/M2 | HEART RATE: 89 BPM

## 2022-06-06 DIAGNOSIS — M47.812 CERVICAL SPONDYLOSIS: Primary | ICD-10-CM

## 2022-06-06 DIAGNOSIS — M50.30 DDD (DEGENERATIVE DISC DISEASE), CERVICAL: ICD-10-CM

## 2022-06-06 PROCEDURE — 99204 OFFICE O/P NEW MOD 45 MIN: CPT | Performed by: STUDENT IN AN ORGANIZED HEALTH CARE EDUCATION/TRAINING PROGRAM

## 2022-06-07 RX ORDER — ROSUVASTATIN CALCIUM 5 MG/1
5 TABLET, COATED ORAL NIGHTLY
Qty: 90 TABLET | Refills: 3 | Status: SHIPPED | OUTPATIENT
Start: 2022-06-07 | End: 2022-12-07

## 2022-06-08 DIAGNOSIS — M16.11 PRIMARY OSTEOARTHRITIS OF RIGHT HIP: ICD-10-CM

## 2022-06-08 DIAGNOSIS — S52.501D UNSPECIFIED FRACTURE OF THE LOWER END OF RIGHT RADIUS, SUBSEQUENT ENCOUNTER FOR CLOSED FRACTURE WITH ROUTINE HEALING: ICD-10-CM

## 2022-06-08 DIAGNOSIS — M54.16 LUMBAR RADICULAR PAIN: ICD-10-CM

## 2022-06-08 DIAGNOSIS — M54.50 LUMBAR SPINE PAIN: ICD-10-CM

## 2022-06-09 RX ORDER — HYDROCODONE BITARTRATE AND ACETAMINOPHEN 10; 325 MG/1; MG/1
1 TABLET ORAL EVERY 6 HOURS PRN
Qty: 120 TABLET | Refills: 0 | Status: SHIPPED | OUTPATIENT
Start: 2022-06-09 | End: 2022-07-07 | Stop reason: SDUPTHER

## 2022-06-30 ENCOUNTER — HOSPITAL ENCOUNTER (OUTPATIENT)
Dept: PAIN MEDICINE | Facility: HOSPITAL | Age: 67
Discharge: HOME | End: 2022-06-30

## 2022-06-30 VITALS
TEMPERATURE: 97.3 F | DIASTOLIC BLOOD PRESSURE: 78 MMHG | HEIGHT: 64 IN | WEIGHT: 160 LBS | SYSTOLIC BLOOD PRESSURE: 128 MMHG | OXYGEN SATURATION: 96 % | BODY MASS INDEX: 27.31 KG/M2 | RESPIRATION RATE: 18 BRPM | HEART RATE: 69 BPM

## 2022-06-30 DIAGNOSIS — M47.812 CERVICAL SPONDYLOSIS: Primary | ICD-10-CM

## 2022-06-30 DIAGNOSIS — R52 PAIN: ICD-10-CM

## 2022-06-30 PROCEDURE — 77003 FLUOROGUIDE FOR SPINE INJECT: CPT

## 2022-06-30 PROCEDURE — 64491 INJ PARAVERT F JNT C/T 2 LEV: CPT | Performed by: STUDENT IN AN ORGANIZED HEALTH CARE EDUCATION/TRAINING PROGRAM

## 2022-06-30 PROCEDURE — 25010000002 DEXAMETHASONE SODIUM PHOSPHATE 10 MG/ML SOLUTION: Performed by: STUDENT IN AN ORGANIZED HEALTH CARE EDUCATION/TRAINING PROGRAM

## 2022-06-30 PROCEDURE — 64490 INJ PARAVERT F JNT C/T 1 LEV: CPT | Performed by: STUDENT IN AN ORGANIZED HEALTH CARE EDUCATION/TRAINING PROGRAM

## 2022-06-30 RX ORDER — BUPIVACAINE HYDROCHLORIDE 2.5 MG/ML
10 INJECTION, SOLUTION EPIDURAL; INFILTRATION; INTRACAUDAL ONCE
Status: COMPLETED | OUTPATIENT
Start: 2022-06-30 | End: 2022-06-30

## 2022-06-30 RX ORDER — LIDOCAINE HYDROCHLORIDE 10 MG/ML
5 INJECTION, SOLUTION EPIDURAL; INFILTRATION; INTRACAUDAL; PERINEURAL ONCE
Status: COMPLETED | OUTPATIENT
Start: 2022-06-30 | End: 2022-06-30

## 2022-06-30 RX ORDER — DEXAMETHASONE SODIUM PHOSPHATE 10 MG/ML
10 INJECTION, SOLUTION INTRAMUSCULAR; INTRAVENOUS ONCE
Status: COMPLETED | OUTPATIENT
Start: 2022-06-30 | End: 2022-06-30

## 2022-06-30 RX ADMIN — BUPIVACAINE HYDROCHLORIDE 6 ML: 2.5 INJECTION, SOLUTION EPIDURAL; INFILTRATION; INTRACAUDAL; PERINEURAL at 08:31

## 2022-06-30 RX ADMIN — LIDOCAINE HYDROCHLORIDE 5 ML: 10 INJECTION, SOLUTION EPIDURAL; INFILTRATION; INTRACAUDAL; PERINEURAL at 08:31

## 2022-06-30 RX ADMIN — DEXAMETHASONE SODIUM PHOSPHATE 10 MG: 10 INJECTION, SOLUTION INTRAMUSCULAR; INTRAVENOUS at 08:31

## 2022-06-30 NOTE — PROCEDURES
PRE OPERATIVE DIAGNOSIS:    1.  Cervical DDD  2.  Cervical Spondylosis     POST OPERATIVE DIAGNOSIS:  Same.     PROCEDURE: B/l C5, 6 and C7 Medial Branch Block.     PROCEDURE DETAILS:   After obtaining written informed consent patient was taken to the procedure room. Pre-procedure blood pressure and pulse were stable and recorded in patients clinic chart.   The patient was placed in a prone position and the lower back was prepped with chloraprep and draped in the usual sterile fashion.  The skin was infiltrated with 1% lidocaine at the junction of transverse process with the vertebral body at the left C7 level. A 22-gauge, 3.5-inch needle was inserted into the lumbar medial branch under radiographic guidance. Both AP and lateral views were obtained.   Following placement of the needle and negative aspiration, 1 cc of .25% bupivacaine with dexamethasone. The identical procedure was performed on the left C 6 and 7 medial branch and right C5, 6, 7 MB. A total of 8 cc of 0.25% bupivacaine with 10 mg of dexamethasone was injected.  During the procedure there was no evidence of CSF, paresthesias or heme.      After the procedure the needles were removed. Skin was cleaned and a sterile dressing was applied.     Following the procedure the patient's vital signs were stable. The patient was discharged home in good condition after being given discharge instructions.     COMPLICATIONS None     Aiden Sanchez DO  Pain Management   [facility]

## 2022-06-30 NOTE — H&P
H and P reviewed from previous visit and no changes to patient's clinical presentation. Will proceed with procedure as planned. Off of Eliquis for 3 days.     Aiden Sanchez DO  Pain Management   [facility]

## 2022-06-30 NOTE — DISCHARGE INSTRUCTIONS
Medial Branch Nerve Block    Medial branch nerve block is a procedure to numb the nerves that supply the joints between your spinal bones (facet joints). The facet joints are located on the back of your spine. You may have the procedure on your neck or your upper, middle, or lower spine.  During this procedure, your health care provider will inject a numbing medicine (local anesthetic) around the medial nerves near the facet joint being treated. If more than one facet joint is causing pain, you may have more than one injection. In most cases, an anti-inflammatory medicine (steroid) will also be injected. You may need this procedure if:  You have back pain from wear and tear (osteoarthritis) of your facet joint.  You have an injury to a facet joint.  Your health care provider wants to diagnose a facet joint as the cause of your pain. If the procedure relieves the pain, this indicates that the facet joint was the cause.  The local anesthetic will relieve pain for several days. The steroid may continue to relieve pain for several weeks. If your pain returns when the medicines wear off, this procedure may be repeated.  Tell a health care provider about:  Any allergies you have.  All medicines you are taking, including vitamins, herbs, eye drops, creams, and over-the-counter medicines.  Any problems you or family members have had with anesthetic medicines.  Any blood disorders you have.  Any surgeries you have had.  Any medical conditions you have.  Whether you are pregnant or may be pregnant.  What are the risks?  Generally, this is a safe procedure. However, problems may occur, including:  Infection.  Bleeding.  Allergic reactions to medicines or dyes.  Damage to other structures or organs.  Injection of the anesthetic into a blood vessel, which may decrease blood supply to your spinal cord and cause damage.  Spread of the anesthetic to nearby nerves, which may cause temporary weakness or numbness.  What happens  before the procedure?  Ask your health care provider about:  Changing or stopping your regular medicines. This is especially important if you are taking diabetes medicines or blood thinners.  Taking over-the-counter medicines, vitamins, herbs, and supplements.  Plan to have a responsible adult take you home from the hospital or clinic if required  Ask your health care provider what steps will be taken to help prevent infection. These may include:  Washing skin with a germ-killing soap.  What happens during the procedure?  You will be given one or more of the following:  A medicine to numb the area (local anesthetic). Your health care provider will feel for the facet joint or joints that are causing pain and inject a short-acting local anesthetic into the skin over the joint or joints.  Your health care provider will then pass a needle into the area around the facet joint.  Your health care provider may use a type of X-ray (fluoroscopy) to look at images of your spinal cord. If so, the health care provider will inject a small amount of dye into the facet joint area. The dye will show up on fluoroscopy and help locate the exact area to inject the long-acting anesthetic.  The medicine will then be injected. Along with the long-acting anesthetic, a steroid medicine may also be injected.  The needle will be removed, and a bandage/band-aid will be placed over the injection site.  The procedure may vary among health care providers.  What can I expect after the procedure?  Your blood pressure, heart rate, breathing rate, and blood oxygen level will be monitored until you leave the hospital or clinic.  You should feel less pain in your back.  You may have some soreness around the injection site.  Follow these instructions at home:  Injection site care  Leave your bandage/band-aid on for 24 hours.  Do not take baths, swim, or use a hot tub for 24 hours.  Check your injection site every day for signs of infection. Check  for:  Redness, swelling, or pain.  Fluid or blood.  Warmth.  Pus or a bad smell.  If you need something for comfort at the site, put ice on the injection area:  Put ice in a plastic bag.  Place a towel between your skin and the bag.  Leave the ice on for 20 minutes, 2-3 times a day.       5.  No heat to injection are for 24-48 hours.  General instructions  Continue your normal pain medications after the procedure.  No heavy lifting, pushing or pulling after the procedure for 24 hours then return to your normal activities.  Keep a log of your pain after the procedure. Keep track of how much pain you have and when you have it. This will help your health care provider plan your future treatment.  You should have relief of pain from the anesthetic for up to 2-3 days.  After that you may notice some pain again until the steroid starts to help. Pain relief from the steroid may last for a few weeks.       4.  Keep all follow-up visits as told by your health care provider. This is important.  Contact your health care provider if:  Your pain is not relieved or gets worse at home.  You have a fever or chills.  You have any signs of infection.  You develop any numbness or weakness.  Summary  Medial branch nerve block is a procedure to numb the nerves that supply the joints between your spinal bones (facet joint).  You may have the procedure on your neck or your upper, middle, or lower spine.  This procedure may be done both to diagnose and relieve facet joint pain.  A long-acting local anesthetic is injected close to the nerve that supplies the facet joint. An anti-inflammatory medicine (steroid) may also be injected.  This information is not intended to replace advice given to you by your health care provider. Make sure you discuss any questions you have with your health care provider.  Document Revised: 04/16/2021 Document Reviewed: 08/22/2019  Elsevier Patient Education © 2021 [employer]

## 2022-07-01 NOTE — TELEPHONE ENCOUNTER
PT HAD   escitalopram (Lexapro) 10 MG tablet      PRESCRIBED ON January 22,2021 AND PT FEELS LIKE THIS IS NOT WORKING AT ALL, SHE SAID SHE FEELS WORSE THAN SHE DID BEFORE SHE STARTED TAKING IT.  PT STATES THAT SHE HAS TAKEN PROZAC IN THE PAST AND IT HAS WORKED FOR HER AND SHE WANTS TO KNOW IF THIS CAN BE PRESCRIBED INSTEAD.      PT STATES THAT SHE THINKS THE   amLODIPine (NORVASC) 5 MG tablet  IS WORKING BUT SHE THINKS THE DOSAGE MAY NEED TO BE INCREASED.      PT CALL BACK  [phone]  OK TO LEAVE A VOICE MAIL    PHARMACY  [facility]  [facility]  [phone]   

## 2022-07-07 DIAGNOSIS — M54.50 LUMBAR SPINE PAIN: ICD-10-CM

## 2022-07-07 DIAGNOSIS — M54.16 LUMBAR RADICULAR PAIN: ICD-10-CM

## 2022-07-07 RX ORDER — HYDROCODONE BITARTRATE AND ACETAMINOPHEN 10; 325 MG/1; MG/1
1 TABLET ORAL EVERY 6 HOURS PRN
Qty: 120 TABLET | Refills: 0 | Status: SHIPPED | OUTPATIENT
Start: 2022-07-07 | End: 2022-08-06 | Stop reason: SDUPTHER

## 2022-07-07 NOTE — TELEPHONE ENCOUNTER
----- Message from Katarina Phillips sent at 7/7/2022 12:21 PM EDT -----  Regarding: Pain medication  Greetings Dr. REED, [place],  Hope you had a great holiday!  I had the nerve block procedure on the 6-30-22, it has not really done much to stop the pain, however, I remain hopeful that it just needs a little more time to work.  I am in need of another prescription for pain meds I am about out.  I am taking hydrocod/acetam , Dr Sanchez is very nice, thank you for sending me his way.  I see Dr. Sanchez on the 20th, I'll keep you posted as to my progress...  God Bless,  Katarina Phillips

## 2022-07-19 NOTE — PROGRESS NOTES
"  Subjective   Katarina Phillips is a 67 y.o. female is here for follow up for neck pain. Patient was last seen on 6/30/2022 for bilateral C5-C7 MBB with no relief.     On last visit:     Neck pain is 7/10 on VAS, maximum 9/10.  Pain is sharp and stabbing in nature.  Pain is referred to bilateral triceps and shoulder, unable to get a bra due to pain.  Pain is constant.  Improved by pain meds.  Worse with turning her neck and using her arms.    Previous Injection:   6/30/2020-B/L C5 7- MBB- no relief.     Hx: Referred by Andrews Victoria MD for neck apain and cervical facet joint injections. Patient has long history of back and neck pain with chronic left-sided foot drop and had multiple surgeries in the 1980s.  In 2006, she had cervical discectomy for 3 levels. She had significant pain since surgery.  R sided weakness since surgery.  She was previously on opioids and wean herself off of them.  Patient had significant pain from [id number]. She had retired from her [employer] job in 2021. States that all of a sudden she starting having weakness and pain in arms after COVID and episodes of A Fib. Arms feel like \"wet noodles.\" She was restarted on pain medications with which she was able to do her daily activities.Her pain has significantly worsens again since 2021.  Was also evaluated by Ortho for possible R hip pathology- it has improved since.  She has tried oral steroid therapy, Norco, Mobic, Medrol Dosepak, Robaxin, tramadol, physical therapy, LEXI previously.  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 EMG - WNL.        PHQ-9- 6  SOAPP- 3  Que[place] disability Scale- 91 - takes care of 5 year old great grand daughter and .      PMH:   History of lumbar surgery (Dr. Durant-1988), cervical discectomy (2006) paroxysmal A. fib, hypertension, chronic left foot drop, anxiety, hip pain, " tremor, R wrist surgery- 2018     Current Medications:    Norco  mg q6H PRN  Meloxicam 7.5 mg daily as needed  Robaxin 500 mg QID PRN  Eliquis 5 mg        Past Medications:  Percocet  mg QID  Morphine ER 60 mg   Tramadol   Robaxin  Gabapentin  Lyrica      Past Modalities:  TENS:                                                                          yes                                                 Physical Therapy Within The Last 6 Months              No (2014)  Psychotherapy                                                            no  Massage Therapy                                                       no     Patient Complains Of:  Uro-Fecal Incontinence          No (urgency)  Weight Gain/Loss                   Yes  (20 lbs- 2021 since [facility])  Fever/Chills                             no  Weakness                               yes         Current Outpatient Medications:   •  amLODIPine (NORVASC) 10 MG tablet, Take 1 tablet by mouth Daily., Disp: 90 tablet, Rfl: 3  •  apixaban (ELIQUIS) 5 MG tablet tablet, Take 1 tablet by mouth Every 12 (Twelve) Hours. Indications: Atrial Fibrillation, Disp: 180 tablet, Rfl: 3  •  cholecalciferol (VITAMIN D3) 25 MCG (1000 UT) tablet, Take 1 tablet by mouth Daily., Disp: 90 tablet, Rfl: 3  •  FLUoxetine (PROzac) 20 MG capsule, TAKE 3 CAPSULES BY MOUTH ONCE DAILY, Disp: 270 capsule, Rfl: 0  •  HYDROcodone-acetaminophen (NORCO)  MG per tablet, Take 1 tablet by mouth Every 6 (Six) Hours As Needed for Moderate Pain ., Disp: 120 tablet, Rfl: 0  •  meloxicam (MOBIC) 7.5 MG tablet, Take 1 tablet by mouth Daily., Disp: 90 tablet, Rfl: 0  •  nitroglycerin (NITROSTAT) 0.4 MG SL tablet, Place 0.4 mg under the tongue Every 5 (Five) Minutes As Needed. do not exceed a total of 3 doses in 15 minutes, Disp: , Rfl:   •  rosuvastatin (CRESTOR) 5 MG tablet, Take 1 tablet by mouth Every Night for 90 days., Disp: 90 tablet, Rfl: 3  •  vitamin B-12 (CYANOCOBALAMIN) 1000 MCG  tablet, Take 1 tablet by mouth Daily., Disp: 90 tablet, Rfl: 3  •  hydroCHLOROthiazide (HYDRODIURIL) 12.5 MG tablet, Take 1 tablet by mouth Daily for 30 days., Disp: 30 tablet, Rfl: 11  •  losartan (COZAAR) 100 MG tablet, Take 1 tablet by mouth Daily for 90 days. Cancel the 25mg tablet on her profile please, Disp: 90 tablet, Rfl: 3    The following portions of the patient's history were reviewed and updated as appropriate: allergies, current medications, past family history, past medical history, past social history, past surgical history, and problem list.      REVIEW OF PERTINENT MEDICAL DATA    Past Medical History:   Diagnosis Date   • Abnormal ECG 04/13/2021   • Atrial fibrillation (HCC)    • Atrial flutter (HCC)    • COVID-19 06/03/2021   • Hyperlipidemia    • Hypertension    • Hyperthyroidism    • Low back pain    • Neck pain      Past Surgical History:   Procedure Laterality Date   • ABLATION OF DYSRHYTHMIC FOCUS  4-13-21- / 5-11-21   • BACK SURGERY     • CARDIAC CATHETERIZATION N/A 05/11/2021    Procedure: Left Heart Cath;  Surgeon: Olman Berg MD;  Location: [facility] CATH INVASIVE LOCATION;  Service: Cardiovascular;  Laterality: N/A;   • CARDIAC ELECTROPHYSIOLOGY PROCEDURE N/A 04/14/2021    Procedure: EP/Ablation;  Surgeon: Ori Tran MD;  Location: [facility] CATH INVASIVE LOCATION;  Service: Cardiovascular;  Laterality: N/A;   • CARDIAC ELECTROPHYSIOLOGY PROCEDURE N/A 05/11/2021    Procedure: EP/Ablation;  Surgeon: Ori Tran MD;  Location: [facility] CATH INVASIVE LOCATION;  Service: Cardiovascular;  Laterality: N/A;   • CATARACT EXTRACTION     • NECK SURGERY     • ORTHOPEDIC SURGERY      mx foot breaks with surgical repair/wrist surgery     Family History   Problem Relation Age of Onset   • Stroke Mother    • Diabetes Mother    • No Known Problems Father      Social History     Socioeconomic History   • Marital status:    Tobacco Use   • Smoking status: Former Smoker     Packs/day:  1.00     Years: 12.00     Pack years: 12.00     Types: Cigarettes     Start date: 1980     Quit date: 1992     Years since quittin.2   • Smokeless tobacco: Never Used   • Tobacco comment: Have not smoked in over 30 years +   Vaping Use   • Vaping Use: Never used   Substance and Sexual Activity   • Alcohol use: Never   • Drug use: Not Currently     Frequency: 1.0 times per week     Types: Marijuana     Comment: Tried eatable once/to replace Opioid  use of 30 years   • Sexual activity: Not Currently     Partners: Male     Birth control/protection: None     Comment: Don't need birth control         Review of Systems      Vitals:    22 0826   BP: 148/69   Pulse: 82   Resp: 16   SpO2: 96%   Weight: 72.6 kg (160 lb)   PainSc:   6         Objective   Physical Exam  Neck:     Musculoskeletal:         General: Tenderness present.        Arms:    Neurological:      Deep Tendon Reflexes:      Reflex Scores:       Tricep reflexes are 3+ on the right side and 3+ on the left side.       Bicep reflexes are 3+ on the right side and 3+ on the left side.       Brachioradialis reflexes are 3+ on the right side and 3+ on the left side.     Comments: Motor strength 4/5 b/l UE - more weakness with flexion  Sensory intact b/l UE             Imaging Reviewed:  EMG bilateral upper extremity-2021  - Normal EMG in bilateral C5-T1 myotomes.     MRI cervical spine with and without contrast-10/29/2021  - H7-5-sxymygpez facet arthropathy.  Mild left neural foraminal narrowing.  C4-5-right greater than left facet hypertrophy causing mild to moderate right neural foraminal narrowing.  C5-6-broad-based disc osteophyte complex resulting in mild indentation of the thecal sac and moderate right neuroforaminal narrowing and mild left neural foraminal narrowing.  C6-7-posterior disc protrusion with left greater than right facet arthropathy.  Moderate to severe left neural foraminal narrowing.  W6-U1-bkixzeatp facet hypertrophy  causing mild bilateral neural foraminal narrowing.      Assessment:    1. DDD (degenerative disc disease), cervical    2. Cervical spondylosis         Plan:   1. Defer UDS for now. Pain meds as per PCP for now. I can take over in future if needed/requested by PCP after UDS.   2.  Discussed with patient that her pain is multifactorial.  Patient has symptoms of myelopathy.  She has radicular pain symptoms bilaterally mostly in C7 dermatome.  MRI does show severe left neuroforaminal narrowing at C6-7 but does not explain right-sided symptoms.  She had cervical epidurals in 2017 from other physicians without much help.  Discussed with patient that she may benefit from DAPHNIE C7-T1.. Discussed the possibility of infection, bleeding, nerve damage, post dural puncture headache, increased pain, paraplegia. Patient understands and agrees.  Currently on Eliquis and needs to be off of Eliquis for 3 days before procedure.  3.  Discussed with patient that if pain is not improved after DAPHNIE, option would be to get a surgical opinion and if surgery is not an option SCS could be considered.       RTC for injection and then 3 week follow up.      Aiden Sanchez DO  Pain Management   [facility]               INSPECT REPORT    As part of the patient's treatment plan, I may be prescribing controlled substances. The patient has been made aware of appropriate use of such medications, including potential risk of somnolence, limited ability to drive and/or work safely, and the potential for dependence or overdose. It has also been made clear that these medications are for use by this patient only, without concomitant use of alcohol or other substances unless prescribed.     Patient has completed prescribing agreement detailing terms of continued prescribing of controlled substances, including monitoring INSPECT reports, urine drug screening, and pill counts if necessary. The patient is aware that inappropriate use will results in  cessation of prescribing such medications.    INSPECT report has been reviewed and scanned into the patient's chart.

## 2022-07-20 ENCOUNTER — OFFICE VISIT (OUTPATIENT)
Dept: PAIN MEDICINE | Facility: CLINIC | Age: 67
End: 2022-07-20

## 2022-07-20 VITALS
HEART RATE: 82 BPM | DIASTOLIC BLOOD PRESSURE: 69 MMHG | SYSTOLIC BLOOD PRESSURE: 148 MMHG | WEIGHT: 160 LBS | BODY MASS INDEX: 27.46 KG/M2 | RESPIRATION RATE: 16 BRPM | OXYGEN SATURATION: 96 %

## 2022-07-20 DIAGNOSIS — M50.30 DDD (DEGENERATIVE DISC DISEASE), CERVICAL: Primary | ICD-10-CM

## 2022-07-20 DIAGNOSIS — M47.812 CERVICAL SPONDYLOSIS: ICD-10-CM

## 2022-07-20 PROCEDURE — 99214 OFFICE O/P EST MOD 30 MIN: CPT | Performed by: STUDENT IN AN ORGANIZED HEALTH CARE EDUCATION/TRAINING PROGRAM

## 2022-08-04 ENCOUNTER — HOSPITAL ENCOUNTER (OUTPATIENT)
Dept: PAIN MEDICINE | Facility: HOSPITAL | Age: 67
Discharge: HOME | End: 2022-08-04

## 2022-08-04 VITALS
HEIGHT: 64 IN | DIASTOLIC BLOOD PRESSURE: 86 MMHG | RESPIRATION RATE: 16 BRPM | TEMPERATURE: 97.7 F | SYSTOLIC BLOOD PRESSURE: 133 MMHG | HEART RATE: 77 BPM | WEIGHT: 160 LBS | BODY MASS INDEX: 27.31 KG/M2 | OXYGEN SATURATION: 94 %

## 2022-08-04 DIAGNOSIS — R52 PAIN: ICD-10-CM

## 2022-08-04 DIAGNOSIS — M50.30 DDD (DEGENERATIVE DISC DISEASE), CERVICAL: Primary | ICD-10-CM

## 2022-08-04 PROCEDURE — 62321 NJX INTERLAMINAR CRV/THRC: CPT | Performed by: STUDENT IN AN ORGANIZED HEALTH CARE EDUCATION/TRAINING PROGRAM

## 2022-08-04 PROCEDURE — 77003 FLUOROGUIDE FOR SPINE INJECT: CPT

## 2022-08-04 PROCEDURE — 0 IOPAMIDOL 41 % SOLUTION: Performed by: STUDENT IN AN ORGANIZED HEALTH CARE EDUCATION/TRAINING PROGRAM

## 2022-08-04 PROCEDURE — 25010000002 DEXAMETHASONE SODIUM PHOSPHATE 10 MG/ML SOLUTION: Performed by: STUDENT IN AN ORGANIZED HEALTH CARE EDUCATION/TRAINING PROGRAM

## 2022-08-04 RX ORDER — DEXAMETHASONE SODIUM PHOSPHATE 10 MG/ML
10 INJECTION, SOLUTION INTRAMUSCULAR; INTRAVENOUS ONCE
Status: COMPLETED | OUTPATIENT
Start: 2022-08-04 | End: 2022-08-04

## 2022-08-04 RX ADMIN — DEXAMETHASONE SODIUM PHOSPHATE 10 MG: 10 INJECTION, SOLUTION INTRAMUSCULAR; INTRAVENOUS at 09:02

## 2022-08-04 RX ADMIN — IOPAMIDOL 3 ML: 408 INJECTION, SOLUTION INTRATHECAL at 09:01

## 2022-08-04 NOTE — DISCHARGE INSTRUCTIONS

## 2022-08-04 NOTE — H&P
H and P reviewed from previous visit and no changes to patient's clinical presentation. Will proceed with procedure as planned. Patient denies history of DM. Off of Eliquis for 3 days.     Aiden Sanchez DO  Pain Management   [facility]

## 2022-08-04 NOTE — PROCEDURES
PREOPERATIVE DIAGNOSIS:    1. Cervical DDD    POSTOPERATIVE DIAGNOSIS:  Same.    PROCEDURE PERFORMED: Cervical LEXI C 7-T1     PROCEDURE IN DETAIL:    Written informed consent was taken from the patient. Pre-procedure blood pressure and pulse were stable and recorded in patients clinic chart. The patient was brought to the procedure room and placed in the prone position on fluoroscopy table. The patient’s neck was prepped with chloraprep and draped in the usual sterile fashion.   The skin over the C 7-T1 space was identified under fluoroscopic guidance and infiltrated with 1% lidocaine for local anesthesia via 25 gauge needle.  A 20 gauge Tuohy needle was inserted through the skin under fluoroscopic guidance until we got to the epidural space with loss of resistance technique.  Negative for CSF and heme.  2 ml of omnipaque contrast was injected under continuous fluoroscopic guidance and good spread was noted from C5-7 space bilaterally. 10 mg of dexamethasone mixed with 4 ml of preservative free normal saline was injected with minimal pressure. The needle was cleared with preservative free normal saline and removed. Band aid was applied.  Following the procedure the patient's vital signs were stable. The patient was discharged home in good condition after being given discharge instructions.    COMPLICATIONS: None    Aiden Sanchez DO  Pain Management   [facility]

## 2022-08-05 ENCOUNTER — TELEPHONE (OUTPATIENT)
Dept: PAIN MEDICINE | Facility: HOSPITAL | Age: 67
End: 2022-08-05

## 2022-08-05 NOTE — TELEPHONE ENCOUNTER
Personally called and discussed headaches after the procedure.  Patient states her pounding headache started around 10:30 PM last night and felt like a splitting headache starting from occipital region radiating to the temples.  Laying down made her headache worse.  She felt pretty good after the procedure until 10:30 PM.  Discussed with patient that this is less likely post dural puncture headache as her headache is worsening with laying down.  Also there was no indication of CSF on aspiration during the epidural.  Her headache is getting better since yesterday but she continues to feel significant neck stiffness.  Discussed with patient that this is most likely secondary to local anesthetic wearing off which may have caused the stiffness.  Recommended using ice pack today and start taking Robaxin up to 3 times daily as needed.  Also recommended hydration and caffeine.  All questions answered.  No significant new weakness, bowel or bladder incontinence.    Aiden Sanchez DO  Pain Management   [facility]

## 2022-08-05 NOTE — TELEPHONE ENCOUNTER
Placed post procedure phone call to patient, patient stated she felt good right after injection yesterday, she stated once the numbing medicine wore off she had a pounding headache on both sides of her head until 3 AM, patient stated lying down made it worse, patient stated she still has a headache but it's manageable today, patient stated her neck is stiff and she has pain in her arms. Patient had no other questions or concerns.

## 2022-08-06 DIAGNOSIS — M54.50 LUMBAR SPINE PAIN: ICD-10-CM

## 2022-08-06 DIAGNOSIS — M54.16 LUMBAR RADICULAR PAIN: ICD-10-CM

## 2022-08-06 RX ORDER — HYDROCODONE BITARTRATE AND ACETAMINOPHEN 10; 325 MG/1; MG/1
1 TABLET ORAL EVERY 6 HOURS PRN
Qty: 120 TABLET | Refills: 0 | Status: SHIPPED | OUTPATIENT
Start: 2022-08-06 | End: 2022-09-06 | Stop reason: SDUPTHER

## 2022-08-23 NOTE — PROGRESS NOTES
"  Subjective   Katarina Phillips is a 67 y.o. female is here for follow up for neck pain. Last seen on 8/4/22 for DAPHNIE C7-T1 with no relief.     On last visit:     Neck pain is 7/10 on VAS, max 9/10. Sharp and stabbing pain. Referred to b/l triceps, shoulder. Unable to get bra on due to pain. Constant pain. Improved by pain meds. Worse with turning her neck and using her arms.     Previous Injection:   8/4/22 - DAPHNIE C7-T1 - no relief.   6/30/2020-B/L C5 7- MBB- no relief.     Hx: Referred by Andrews Victoria MD for neck apain and cervical facet joint injections. Patient has long history of back and neck pain with chronic left-sided foot drop and had multiple surgeries in the 1980s.  In 2006, she had cervical discectomy for 3 levels. She had significant pain since surgery.  R sided weakness since surgery.  She was previously on opioids and wean herself off of them.  Patient had significant pain from 1325-3702. She had retired from her [employer] job in 2021. States that all of a sudden she starting having weakness and pain in arms after COVID and episodes of A Fib. Arms feel like \"wet noodles.\" She was restarted on pain medications with which she was able to do her daily activities.Her pain has significantly worsens again since 2021.  Was also evaluated by Ortho for possible R hip pathology- it has improved since.  She has tried oral steroid therapy, Norco, Mobic, Medrol Dosepak, Robaxin, tramadol, physical therapy, LEXI previously.  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 EMG - WNL.        PHQ-9- 6  SOAPP- 3  Que[place] disability Scale- 91 - takes care of 5 year old great grand daughter and .      PMH:   History of lumbar surgery (Dr. Durant-1988), cervical discectomy (2006) paroxysmal A. fib, hypertension, chronic left foot drop, anxiety, hip pain, tremor, R wrist surgery- 2018   "   Current Medications:   Norco  mg q6H PRN  Meloxicam 7.5 mg daily as needed  Robaxin 500 mg QID PRN  Eliquis 5 mg        Past Medications:  Percocet  mg QID  Morphine ER 60 mg   Tramadol   Robaxin  Gabapentin  Lyrica      Past Modalities:  TENS:                                                                          yes                                                 Physical Therapy Within The Last 6 Months              No (2014)  Psychotherapy                                                            no  Massage Therapy                                                       no     Patient Complains Of:  Uro-Fecal Incontinence          No (urgency)  Weight Gain/Loss                   Yes  (20 lbs- 2021 since [facility])  Fever/Chills                             no  Weakness                               yes         Current Outpatient Medications:   •  amLODIPine (NORVASC) 10 MG tablet, Take 1 tablet by mouth Daily., Disp: 90 tablet, Rfl: 3  •  apixaban (ELIQUIS) 5 MG tablet tablet, Take 1 tablet by mouth Every 12 (Twelve) Hours. Indications: Atrial Fibrillation, Disp: 180 tablet, Rfl: 3  •  cholecalciferol (VITAMIN D3) 25 MCG (1000 UT) tablet, Take 1 tablet by mouth Daily., Disp: 90 tablet, Rfl: 3  •  FLUoxetine (PROzac) 20 MG capsule, TAKE 3 CAPSULES BY MOUTH ONCE DAILY, Disp: 270 capsule, Rfl: 0  •  HYDROcodone-acetaminophen (NORCO)  MG per tablet, Take 1 tablet by mouth Every 6 (Six) Hours As Needed for Moderate Pain ., Disp: 120 tablet, Rfl: 0  •  meloxicam (MOBIC) 7.5 MG tablet, Take 1 tablet by mouth Daily., Disp: 90 tablet, Rfl: 0  •  nitroglycerin (NITROSTAT) 0.4 MG SL tablet, Place 0.4 mg under the tongue Every 5 (Five) Minutes As Needed. do not exceed a total of 3 doses in 15 minutes, Disp: , Rfl:   •  rosuvastatin (CRESTOR) 5 MG tablet, Take 1 tablet by mouth Every Night for 90 days., Disp: 90 tablet, Rfl: 3  •  vitamin B-12 (CYANOCOBALAMIN) 1000 MCG tablet, Take 1 tablet by mouth  Daily., Disp: 90 tablet, Rfl: 3  •  hydroCHLOROthiazide (HYDRODIURIL) 12.5 MG tablet, Take 1 tablet by mouth Daily for 30 days., Disp: 30 tablet, Rfl: 11  •  losartan (COZAAR) 100 MG tablet, Take 1 tablet by mouth Daily for 90 days. Cancel the 25mg tablet on her profile please, Disp: 90 tablet, Rfl: 3    The following portions of the patient's history were reviewed and updated as appropriate: allergies, current medications, past family history, past medical history, past social history, past surgical history, and problem list.      REVIEW OF PERTINENT MEDICAL DATA    Past Medical History:   Diagnosis Date   • Abnormal ECG 04/13/2021   • Atrial fibrillation (HCC)    • Atrial flutter (HCC)    • COVID-19 06/03/2021   • Hyperlipidemia    • Hypertension    • Hyperthyroidism    • Low back pain    • Neck pain      Past Surgical History:   Procedure Laterality Date   • ABLATION OF DYSRHYTHMIC FOCUS  4-13-21- / 5-11-21   • BACK SURGERY     • CARDIAC CATHETERIZATION N/A 05/11/2021    Procedure: Left Heart Cath;  Surgeon: Olman Berg MD;  Location: [facility] CATH INVASIVE LOCATION;  Service: Cardiovascular;  Laterality: N/A;   • CARDIAC ELECTROPHYSIOLOGY PROCEDURE N/A 04/14/2021    Procedure: EP/Ablation;  Surgeon: Ori Tran MD;  Location: [facility] CATH INVASIVE LOCATION;  Service: Cardiovascular;  Laterality: N/A;   • CARDIAC ELECTROPHYSIOLOGY PROCEDURE N/A 05/11/2021    Procedure: EP/Ablation;  Surgeon: Ori Tran MD;  Location: [facility] CATH INVASIVE LOCATION;  Service: Cardiovascular;  Laterality: N/A;   • CATARACT EXTRACTION     • NECK SURGERY     • ORTHOPEDIC SURGERY      mx foot breaks with surgical repair/wrist surgery     Family History   Problem Relation Age of Onset   • Stroke Mother    • Diabetes Mother    • No Known Problems Father      Social History     Socioeconomic History   • Marital status:    Tobacco Use   • Smoking status: Former Smoker     Packs/day: 1.00     Years: 12.00     Pack  years: 12.00     Types: Cigarettes     Start date: 1980     Quit date: 1992     Years since quittin.3   • Smokeless tobacco: Never Used   • Tobacco comment: Have not smoked in over 30 years +   Vaping Use   • Vaping Use: Never used   Substance and Sexual Activity   • Alcohol use: Never   • Drug use: Not Currently     Frequency: 1.0 times per week     Types: Marijuana     Comment: Tried eatable once/to replace Opioid  use of 30 years   • Sexual activity: Not Currently     Partners: Male     Birth control/protection: None     Comment: Don't need birth control         Review of Systems   Musculoskeletal: Positive for neck pain and neck stiffness.         Vitals:    22 0832   BP: 148/61   Pulse: 80   Resp: 16   SpO2: 94%   PainSc:   5         Objective   Physical Exam  Neck:     Musculoskeletal:         General: Tenderness present.        Arms:    Neurological:      Deep Tendon Reflexes:      Reflex Scores:       Tricep reflexes are 3+ on the right side and 3+ on the left side.       Bicep reflexes are 3+ on the right side and 3+ on the left side.       Brachioradialis reflexes are 3+ on the right side and 3+ on the left side.     Comments: Motor strength 4/5 b/l UE - more weakness with flexion  Sensory intact b/l UE             Imaging Reviewed:  EMG bilateral upper extremity-2021  - Normal EMG in bilateral C5-T1 myotomes.     MRI cervical spine with and without contrast-10/29/2021  - A1-4-vhfpyssdy facet arthropathy.  Mild left neural foraminal narrowing.  C4-5-right greater than left facet hypertrophy causing mild to moderate right neural foraminal narrowing.  C5-6-broad-based disc osteophyte complex resulting in mild indentation of the thecal sac and moderate right neuroforaminal narrowing and mild left neural foraminal narrowing.  C6-7-posterior disc protrusion with left greater than right facet arthropathy.  Moderate to severe left neural foraminal narrowing.  O7-D6-[id number] facet  hypertrophy causing mild bilateral neural foraminal narrowing.      Assessment:    1. DDD (degenerative disc disease), cervical    2. H/O cervical discectomy         Plan:   1. Defer UDS for now. Pain meds as per PCP for now. I can take over in future if needed/requested by PCP after UDS.   2.  Discussed with patient that her pain is multifactorial.  Patient has symptoms of myelopathy.  She has radicular pain symptoms bilaterally mostly in C7 dermatome.  MRI does show severe left neuroforaminal narrowing at C6-7 but does not explain right-sided symptoms.  She had cervical epidurals in 2017 from other physicians without much help.  She had another DAPHNIE and cervical MBB b/l C5-7 without any relief unfortunately. Will obtain new cervical MRI since last MRI was 1 year ago and will refer her to neurosurgery for possible surgical evaluation.   3. If surgery is not an option, SCS could be a last option. Patient is not interested in SCS.       RTC after seeing neurosurgery.     Aiden Sanchez DO  Pain Management   [facility]               INSPECT REPORT    As part of the patient's treatment plan, I may be prescribing controlled substances. The patient has been made aware of appropriate use of such medications, including potential risk of somnolence, limited ability to drive and/or work safely, and the potential for dependence or overdose. It has also been made clear that these medications are for use by this patient only, without concomitant use of alcohol or other substances unless prescribed.     Patient has completed prescribing agreement detailing terms of continued prescribing of controlled substances, including monitoring INSPECT reports, urine drug screening, and pill counts if necessary. The patient is aware that inappropriate use will results in cessation of prescribing such medications.    INSPECT report has been reviewed and scanned into the patient's chart.

## 2022-08-24 ENCOUNTER — OFFICE VISIT (OUTPATIENT)
Dept: PAIN MEDICINE | Facility: CLINIC | Age: 67
End: 2022-08-24

## 2022-08-24 VITALS
DIASTOLIC BLOOD PRESSURE: 61 MMHG | OXYGEN SATURATION: 94 % | SYSTOLIC BLOOD PRESSURE: 148 MMHG | RESPIRATION RATE: 16 BRPM | HEART RATE: 80 BPM

## 2022-08-24 DIAGNOSIS — Z98.890 H/O CERVICAL DISCECTOMY: ICD-10-CM

## 2022-08-24 DIAGNOSIS — M50.30 DDD (DEGENERATIVE DISC DISEASE), CERVICAL: Primary | ICD-10-CM

## 2022-08-24 PROCEDURE — 99213 OFFICE O/P EST LOW 20 MIN: CPT | Performed by: STUDENT IN AN ORGANIZED HEALTH CARE EDUCATION/TRAINING PROGRAM

## 2022-09-02 RX ORDER — FLUOXETINE HYDROCHLORIDE 20 MG/1
CAPSULE ORAL
Qty: 270 CAPSULE | Refills: 0 | Status: SHIPPED | OUTPATIENT
Start: 2022-09-02 | End: 2022-11-27 | Stop reason: SDUPTHER

## 2022-09-06 DIAGNOSIS — M54.50 LUMBAR SPINE PAIN: ICD-10-CM

## 2022-09-06 DIAGNOSIS — M54.16 LUMBAR RADICULAR PAIN: ICD-10-CM

## 2022-09-06 RX ORDER — HYDROCODONE BITARTRATE AND ACETAMINOPHEN 10; 325 MG/1; MG/1
1 TABLET ORAL EVERY 6 HOURS PRN
Qty: 120 TABLET | Refills: 0 | Status: SHIPPED | OUTPATIENT
Start: 2022-09-06 | End: 2022-10-14 | Stop reason: SDUPTHER

## 2022-09-26 ENCOUNTER — APPOINTMENT (OUTPATIENT)
Dept: MRI IMAGING | Facility: HOSPITAL | Age: 67
End: 2022-09-26

## 2022-10-11 ENCOUNTER — TELEPHONE (OUTPATIENT)
Dept: FAMILY MEDICINE CLINIC | Facility: CLINIC | Age: 67
End: 2022-10-11

## 2022-10-11 NOTE — TELEPHONE ENCOUNTER
Left detailed message on patient's voice mail at 2:58pm to call the office back to schedule an appt for tomorrow with Dr Covington or one of the NP's.    HUB TO SHARE    call the office back to schedule an appt for tomorrow with Dr Covington or one of the NP's.

## 2022-10-11 NOTE — TELEPHONE ENCOUNTER
----- Message from Katarina Phillips sent at 10/11/2022  9:51 AM EDT -----  Regarding: After visit Urgent Care  Contact: [phone]  Greetings Dr. REED,   Went to urgent care they said I had a virus.  That was 3 weeks ago, I am only getting worse.  Deep cough flehm is greenish, and my head and nasal passages are plugged.  I am so very weak and in pain.  Would you like me to come in?  I have been avoiding everyone as not to make others sick.  I will do as you suggest!  Also, I need a refill of pain medicine I have 3 days left.      JOHN Gonzalez and I are both sick, Kathryn Lucas is fine...  Thank you Dr. DEREK Phillips  [phone]

## 2022-10-14 ENCOUNTER — OFFICE VISIT (OUTPATIENT)
Dept: FAMILY MEDICINE CLINIC | Facility: CLINIC | Age: 67
End: 2022-10-14

## 2022-10-14 VITALS
OXYGEN SATURATION: 92 % | RESPIRATION RATE: 15 BRPM | HEIGHT: 64 IN | TEMPERATURE: 96.9 F | DIASTOLIC BLOOD PRESSURE: 60 MMHG | HEART RATE: 87 BPM | BODY MASS INDEX: 27.83 KG/M2 | WEIGHT: 163 LBS | SYSTOLIC BLOOD PRESSURE: 110 MMHG

## 2022-10-14 DIAGNOSIS — J01.00 SUBACUTE MAXILLARY SINUSITIS: Primary | ICD-10-CM

## 2022-10-14 DIAGNOSIS — M54.16 LUMBAR RADICULAR PAIN: ICD-10-CM

## 2022-10-14 DIAGNOSIS — H66.001 NON-RECURRENT ACUTE SUPPURATIVE OTITIS MEDIA OF RIGHT EAR WITHOUT SPONTANEOUS RUPTURE OF TYMPANIC MEMBRANE: ICD-10-CM

## 2022-10-14 DIAGNOSIS — M54.50 LUMBAR SPINE PAIN: ICD-10-CM

## 2022-10-14 PROBLEM — H66.41 SUPPURATIVE OTITIS MEDIA OF RIGHT EAR: Status: ACTIVE | Noted: 2022-10-14

## 2022-10-14 PROCEDURE — 99213 OFFICE O/P EST LOW 20 MIN: CPT | Performed by: INTERNAL MEDICINE

## 2022-10-14 RX ORDER — AMOXICILLIN AND CLAVULANATE POTASSIUM 875; 125 MG/1; MG/1
1 TABLET, FILM COATED ORAL 2 TIMES DAILY
Qty: 20 TABLET | Refills: 0 | Status: SHIPPED | OUTPATIENT
Start: 2022-10-14 | End: 2022-10-24 | Stop reason: SDUPTHER

## 2022-10-14 RX ORDER — HYDROCODONE BITARTRATE AND ACETAMINOPHEN 10; 325 MG/1; MG/1
1 TABLET ORAL EVERY 6 HOURS PRN
Qty: 120 TABLET | Refills: 0 | Status: SHIPPED | OUTPATIENT
Start: 2022-10-14 | End: 2022-11-21 | Stop reason: SDUPTHER

## 2022-10-14 NOTE — PROGRESS NOTES
"Rooming Tab(CC,VS,Pt Hx,Fall Screen)  Chief Complaint   Patient presents with   • Cough   • Nasal Congestion   • Sore Throat       Subjective      @ name @ is a @ age @ @ sex @ who presents for follow-up.    Atrial fibrillation  The patient states that her atrial fibrillation has improved. Her heart rate is good. She reports that she is weak all the time. She states that she has to take a nap every day. She reports that she is not herself anymore and she is miserable.    Cough and congestion  The patient reports that she went to Urgent Care 3 weeks ago and Dr. Toro gave her cough medicine because she was coughing her guts out and blowing her nose. She states that her nose is very colored, yellow, greenish, and brownish. She reports that she is not coughing as much except for when she wakes up in the morning. She states that she coughs up everything that is drained out of her nostrils from the night before. She reports that she has to sit in her recliner sitting up to sleep because if she lays down, her mouth is open and her insides are all dry.    I have reviewed and updated her medications, medical history and problem list during today's office visit.     Patient Care Team:  Andrews Ramírez MD as PCP - General (Family Medicine)    Problem List Tab  Medications Tab  Synopsis Tab  Chart Review Tab  Care Everywhere Tab  Immunizations Tab  Patient History Tab    Social History     Tobacco Use   • Smoking status: Former     Packs/day: 1.00     Years: 12.00     Pack years: 12.00     Types: Cigarettes     Start date: 1980     Quit date: 1992     Years since quittin.5   • Smokeless tobacco: Never   • Tobacco comments:     Have not smoked in over 30 years +   Substance Use Topics   • Alcohol use: Never       Review of Systems    Objective     Rooming Tab(CC,VS,Pt Hx,Fall Screen)  /60   Pulse 87   Temp 96.9 °F (36.1 °C)   Resp 15   Ht 162.6 cm (64\")   Wt 73.9 kg (163 lb)   SpO2 92%   BMI " 27.98 kg/m²     Body mass index is 27.98 kg/m².    Physical Exam  Vitals and nursing note reviewed.   Constitutional:       Appearance: Normal appearance. She is well-developed.   HENT:      Head: Normocephalic and atraumatic.      Left Ear: Tympanic membrane normal.      Ears:      Comments: Red dull serous     Nose: Congestion and rhinorrhea present.      Comments: Swollen beefy red turbinates     Mouth/Throat:      Pharynx: Posterior oropharyngeal erythema present.      Comments: PND  Eyes:      Pupils: Pupils are equal, round, and reactive to light.   Cardiovascular:      Rate and Rhythm: Normal rate and regular rhythm.      Pulses: Normal pulses.      Heart sounds: Normal heart sounds. No murmur heard.  Pulmonary:      Effort: Pulmonary effort is normal.      Breath sounds: Normal breath sounds.   Musculoskeletal:      Cervical back: Normal range of motion and neck supple.   Lymphadenopathy:      Cervical: Cervical adenopathy present.   Skin:     Capillary Refill: Capillary refill takes less than 2 seconds.   Neurological:      Mental Status: She is alert and oriented to person, place, and time.   Psychiatric:         Mood and Affect: Mood normal.         Behavior: Behavior normal.          Statin Choice Calculator  Data Reviewed:         The data below has been reviewed by Jenny Carranza MD on 10/14/2022.          Assessment & Plan   Order Review Tab  Health Maintenance Tab  Patient Plan/Order Tab  Diagnoses and all orders for this visit:    1. Subacute maxillary sinusitis (Primary)    2. Non-recurrent acute suppurative otitis media of right ear without spontaneous rupture of tympanic membrane    Other orders  -     amoxicillin-clavulanate (Augmentin) 875-125 MG per tablet; Take 1 tablet by mouth 2 (Two) Times a Day.  Dispense: 20 tablet; Refill: 0      Sinus infection  - I will prescribe Augmentin.  - I advised the patient to eat yogurt.    Wrapup Tab  Return if symptoms worsen or fail to improve.        They were informed of the diagnosis and treatment plan and directed to f/u for any further problems or concerns.      Transcribed from ambient dictation for Jenny Carranza MD by Krissy Avila.  10/14/22   16:56 EDT    Patient or patient representative verbalized consent to the visit recording.  I have personally performed the services described in this document as transcribed by the above individual, and it is both accurate and complete.  Jenny Carranza MD  10/18/2022  08:40 EDT

## 2022-10-24 RX ORDER — AMOXICILLIN AND CLAVULANATE POTASSIUM 875; 125 MG/1; MG/1
1 TABLET, FILM COATED ORAL 2 TIMES DAILY
Qty: 8 TABLET | Refills: 0 | Status: SHIPPED | OUTPATIENT
Start: 2022-10-24 | End: 2022-11-17

## 2022-11-17 ENCOUNTER — HOSPITAL ENCOUNTER (EMERGENCY)
Facility: HOSPITAL | Age: 67
Discharge: HOME | End: 2022-11-17
Attending: EMERGENCY MEDICINE | Admitting: EMERGENCY MEDICINE

## 2022-11-17 ENCOUNTER — APPOINTMENT (OUTPATIENT)
Dept: CT IMAGING | Facility: HOSPITAL | Age: 67
End: 2022-11-17

## 2022-11-17 VITALS
SYSTOLIC BLOOD PRESSURE: 142 MMHG | HEIGHT: 64 IN | DIASTOLIC BLOOD PRESSURE: 72 MMHG | HEART RATE: 78 BPM | WEIGHT: 164.46 LBS | TEMPERATURE: 98.9 F | RESPIRATION RATE: 19 BRPM | OXYGEN SATURATION: 98 % | BODY MASS INDEX: 28.08 KG/M2

## 2022-11-17 DIAGNOSIS — R10.9 FLANK PAIN: ICD-10-CM

## 2022-11-17 DIAGNOSIS — N39.0 URINARY TRACT INFECTION WITHOUT HEMATURIA, SITE UNSPECIFIED: Primary | ICD-10-CM

## 2022-11-17 LAB
ALBUMIN SERPL-MCNC: 4.3 G/DL (ref 3.5–5.2)
ALBUMIN/GLOB SERPL: 1.4 G/DL
ALP SERPL-CCNC: 113 U/L (ref 39–117)
ALT SERPL W P-5'-P-CCNC: 28 U/L (ref 1–33)
ANION GAP SERPL CALCULATED.3IONS-SCNC: 12 MMOL/L (ref 5–15)
AST SERPL-CCNC: 26 U/L (ref 1–32)
BACTERIA UR QL AUTO: ABNORMAL /HPF
BASOPHILS # BLD AUTO: 0.1 10*3/MM3 (ref 0–0.2)
BASOPHILS NFR BLD AUTO: 1 % (ref 0–1.5)
BILIRUB SERPL-MCNC: 0.3 MG/DL (ref 0–1.2)
BILIRUB UR QL STRIP: NEGATIVE
BUN SERPL-MCNC: 12 MG/DL (ref 8–23)
BUN/CREAT SERPL: 21.1 (ref 7–25)
CALCIUM SPEC-SCNC: 9 MG/DL (ref 8.6–10.5)
CHLORIDE SERPL-SCNC: 97 MMOL/L (ref 98–107)
CLARITY UR: ABNORMAL
CO2 SERPL-SCNC: 27 MMOL/L (ref 22–29)
COLOR UR: ABNORMAL
CREAT SERPL-MCNC: 0.57 MG/DL (ref 0.57–1)
DEPRECATED RDW RBC AUTO: 44.2 FL (ref 37–54)
EGFRCR SERPLBLD CKD-EPI 2021: 99.7 ML/MIN/1.73
EOSINOPHIL # BLD AUTO: 0.1 10*3/MM3 (ref 0–0.4)
EOSINOPHIL NFR BLD AUTO: 1.2 % (ref 0.3–6.2)
ERYTHROCYTE [DISTWIDTH] IN BLOOD BY AUTOMATED COUNT: 14.6 % (ref 12.3–15.4)
GLOBULIN UR ELPH-MCNC: 3.1 GM/DL
GLUCOSE SERPL-MCNC: 119 MG/DL (ref 65–99)
GLUCOSE UR STRIP-MCNC: NEGATIVE MG/DL
HCT VFR BLD AUTO: 42.1 % (ref 34–46.6)
HGB BLD-MCNC: 13.7 G/DL (ref 12–15.9)
HGB UR QL STRIP.AUTO: ABNORMAL
HYALINE CASTS UR QL AUTO: ABNORMAL /LPF
KETONES UR QL STRIP: ABNORMAL
LEUKOCYTE ESTERASE UR QL STRIP.AUTO: ABNORMAL
LYMPHOCYTES # BLD AUTO: 1.5 10*3/MM3 (ref 0.7–3.1)
LYMPHOCYTES NFR BLD AUTO: 18.2 % (ref 19.6–45.3)
MCH RBC QN AUTO: 28.1 PG (ref 26.6–33)
MCHC RBC AUTO-ENTMCNC: 32.5 G/DL (ref 31.5–35.7)
MCV RBC AUTO: 86.3 FL (ref 79–97)
MONOCYTES # BLD AUTO: 0.5 10*3/MM3 (ref 0.1–0.9)
MONOCYTES NFR BLD AUTO: 6.1 % (ref 5–12)
NEUTROPHILS NFR BLD AUTO: 6.2 10*3/MM3 (ref 1.7–7)
NEUTROPHILS NFR BLD AUTO: 73.5 % (ref 42.7–76)
NITRITE UR QL STRIP: NEGATIVE
NRBC BLD AUTO-RTO: 0 /100 WBC (ref 0–0.2)
PH UR STRIP.AUTO: 6 [PH] (ref 5–8)
PLATELET # BLD AUTO: 297 10*3/MM3 (ref 140–450)
PMV BLD AUTO: 8.6 FL (ref 6–12)
POTASSIUM SERPL-SCNC: 3.6 MMOL/L (ref 3.5–5.2)
PROT SERPL-MCNC: 7.4 G/DL (ref 6–8.5)
PROT UR QL STRIP: ABNORMAL
RBC # BLD AUTO: 4.88 10*6/MM3 (ref 3.77–5.28)
RBC # UR STRIP: ABNORMAL /HPF
REF LAB TEST METHOD: ABNORMAL
SODIUM SERPL-SCNC: 136 MMOL/L (ref 136–145)
SP GR UR STRIP: 1.03 (ref 1–1.03)
SQUAMOUS #/AREA URNS HPF: ABNORMAL /HPF
UROBILINOGEN UR QL STRIP: ABNORMAL
WBC # UR STRIP: ABNORMAL /HPF
WBC NRBC COR # BLD: 8.4 10*3/MM3 (ref 3.4–10.8)

## 2022-11-17 PROCEDURE — 81001 URINALYSIS AUTO W/SCOPE: CPT | Performed by: NURSE PRACTITIONER

## 2022-11-17 PROCEDURE — 25010000002 ONDANSETRON PER 1 MG: Performed by: NURSE PRACTITIONER

## 2022-11-17 PROCEDURE — 99283 EMERGENCY DEPT VISIT LOW MDM: CPT

## 2022-11-17 PROCEDURE — 87186 SC STD MICRODIL/AGAR DIL: CPT | Performed by: NURSE PRACTITIONER

## 2022-11-17 PROCEDURE — 74176 CT ABD & PELVIS W/O CONTRAST: CPT

## 2022-11-17 PROCEDURE — 87086 URINE CULTURE/COLONY COUNT: CPT | Performed by: NURSE PRACTITIONER

## 2022-11-17 PROCEDURE — 85025 COMPLETE CBC W/AUTO DIFF WBC: CPT | Performed by: NURSE PRACTITIONER

## 2022-11-17 PROCEDURE — 96374 THER/PROPH/DIAG INJ IV PUSH: CPT

## 2022-11-17 PROCEDURE — 25010000002 MORPHINE PER 10 MG: Performed by: NURSE PRACTITIONER

## 2022-11-17 PROCEDURE — 80053 COMPREHEN METABOLIC PANEL: CPT | Performed by: NURSE PRACTITIONER

## 2022-11-17 PROCEDURE — P9612 CATHETERIZE FOR URINE SPEC: HCPCS

## 2022-11-17 PROCEDURE — 96375 TX/PRO/DX INJ NEW DRUG ADDON: CPT

## 2022-11-17 RX ORDER — SODIUM CHLORIDE 0.9 % (FLUSH) 0.9 %
10 SYRINGE (ML) INJECTION AS NEEDED
Status: DISCONTINUED | OUTPATIENT
Start: 2022-11-17 | End: 2022-11-17 | Stop reason: HOSPADM

## 2022-11-17 RX ORDER — PHENAZOPYRIDINE HYDROCHLORIDE 200 MG/1
200 TABLET, FILM COATED ORAL 3 TIMES DAILY PRN
Qty: 6 TABLET | Refills: 0 | Status: SHIPPED | OUTPATIENT
Start: 2022-11-17 | End: 2022-12-06

## 2022-11-17 RX ORDER — ONDANSETRON 2 MG/ML
4 INJECTION INTRAMUSCULAR; INTRAVENOUS ONCE
Status: COMPLETED | OUTPATIENT
Start: 2022-11-17 | End: 2022-11-17

## 2022-11-17 RX ORDER — CEFDINIR 300 MG/1
300 CAPSULE ORAL 2 TIMES DAILY
Qty: 10 CAPSULE | Refills: 0 | Status: SHIPPED | OUTPATIENT
Start: 2022-11-17 | End: 2022-11-22

## 2022-11-17 RX ADMIN — Medication 10 ML: at 08:50

## 2022-11-17 RX ADMIN — MORPHINE SULFATE 4 MG: 4 INJECTION, SOLUTION INTRAMUSCULAR; INTRAVENOUS at 08:51

## 2022-11-17 RX ADMIN — ONDANSETRON 4 MG: 2 INJECTION INTRAMUSCULAR; INTRAVENOUS at 08:51

## 2022-11-17 NOTE — DISCHARGE INSTRUCTIONS
Medications as directed and follow-up with your family doctor for recheck of urine and test of cure, urine culture is pending.  Take your already prescribed medications for pain.  Return for any new or worsening symptoms

## 2022-11-17 NOTE — ED PROVIDER NOTES
Subjective   History of Present Illness  Chief complaint: Flank pain      Context: Patient is a 67-year-old female who comes in ambulatory by private vehicle with a past medical history significant for chronic pain in pain management hyperlipidemia hypertension multiple back surgeries paroxysmal A. Fib with complaints of right-sided flank pain urinary urgency dribbling dysuria that started 3 days ago and has progressively gotten worse.  She was on antibiotics approximately a month ago for sinusitis.  She does have a prescription for Norco for pain management.  Has had some nausea without vomiting or change in bowel habits.  She denies any midline neck or back pain.  No fever.  Denies any distal weakness or paresthesias.no history of kidney stones and does not currently follow with urology or nephrology.    Location: Right flank    Duration: 3 days  Timing:  Quality/Severity: Moderate  Modifying factors: Worse with urination    Associated symptoms:nausea        Additional hx provided by: Fortino    PCP:   cornelio          Review of Systems   Constitutional: Negative for fever.   HENT: Negative.    Respiratory: Negative.    Cardiovascular: Negative for palpitations and leg swelling.   Gastrointestinal: Positive for nausea.   Genitourinary: Positive for dysuria, flank pain, frequency and urgency. Negative for hematuria.   Musculoskeletal: Positive for back pain.   Skin: Negative for rash.   Allergic/Immunologic: Negative for immunocompromised state.   Hematological: Does not bruise/bleed easily.       Past Medical History:   Diagnosis Date   • Abnormal ECG 04/13/2021   • Anxiety May 2021   • Atrial fibrillation (HCC)    • Atrial flutter (HCC)    • Cataract 2020   • COVID-19 06/03/2021   • Hyperlipidemia    • Hypertension    • Hyperthyroidism    • Low back pain    • Neck pain    • Tremor [id number]   • Visual impairment 1990       Allergies   Allergen Reactions   • Doxycycline Hives   • Sulfa Antibiotics Hives       Past  Surgical History:   Procedure Laterality Date   • ABLATION OF DYSRHYTHMIC FOCUS  21- / 21   • BACK SURGERY     • CARDIAC CATHETERIZATION N/A 2021    Procedure: Left Heart Cath;  Surgeon: Olman Berg MD;  Location: [facility] CATH INVASIVE LOCATION;  Service: Cardiovascular;  Laterality: N/A;   • CARDIAC ELECTROPHYSIOLOGY PROCEDURE N/A 2021    Procedure: EP/Ablation;  Surgeon: Ori Tran MD;  Location:  [facility] CATH INVASIVE LOCATION;  Service: Cardiovascular;  Laterality: N/A;   • CARDIAC ELECTROPHYSIOLOGY PROCEDURE N/A 2021    Procedure: EP/Ablation;  Surgeon: Ori Tran MD;  Location:  [facility] CATH INVASIVE LOCATION;  Service: Cardiovascular;  Laterality: N/A;   • CATARACT EXTRACTION     • COLONOSCOPY     • NECK SURGERY     • ORTHOPEDIC SURGERY      mx foot breaks with surgical repair/wrist surgery   • TUBAL ABDOMINAL LIGATION         Family History   Problem Relation Age of Onset   • Stroke Mother    • Diabetes Mother    • No Known Problems Father        Social History     Socioeconomic History   • Marital status:    Tobacco Use   • Smoking status: Former     Packs/day: 1.00     Years: 12.00     Pack years: 12.00     Types: Cigarettes     Start date: 1980     Quit date: 1992     Years since quittin.6   • Smokeless tobacco: Never   • Tobacco comments:     Have not smoked in over 30 years +   Vaping Use   • Vaping Use: Never used   Substance and Sexual Activity   • Alcohol use: Never   • Drug use: Not Currently     Frequency: 1.0 times per week     Types: Marijuana     Comment: Tried eatable once/to replace Opioid  use of 30 years   • Sexual activity: Not Currently     Partners: Male     Birth control/protection: None     Comment: Don't need birth control           Objective   Physical Exam     Vital signs and triage nurse note reviewed.   Constitutional: Awake, alert; nontoxic in appearance.  Uncomfortable  HEENT: Normocephalic, atraumatic;  pupils   with intact EOM; oropharynx is pink and moist without exudate or erythema.   Neck: Supple, full range of motion without pain;     Cardiovascular: Regular rate and rhythm, normal S1-S2.   Pulmonary: Respiratory effort regular nonlabored, breath sounds clear to auscultation all fields.   Abdomen: Soft, tender in the right lower quadrant without peritoneal rigidity nondistended with normoactive bowel sounds; no rebound or guarding.  Right CVAT  Musculoskeletal: Independent range of motion of all extremities with no palpable tenderness or edema.    No midline tenderness down the CT or L-spine.  No left CVAT   Neuro: Alert oriented x3, speech is clear and appropriate, GCS 15   Skin:  Fleshtone warm, dry, intact; no erythematous or petechial rash or lesion       Procedures           ED Course  ED Course as of 11/17/22 1105   Thu Nov 17, 2022   0943 Waiting on patient to go to CT [JW]      ED Course User Index  [JW] Marci Ivey, MONIK      Labs Reviewed   COMPREHENSIVE METABOLIC PANEL - Abnormal; Notable for the following components:       Result Value    Glucose 119 (*)     Chloride 97 (*)     All other components within normal limits    Narrative:     GFR Normal >60  Chronic Kidney Disease <60  Kidney Failure <15     URINALYSIS W/ CULTURE IF INDICATED - Abnormal; Notable for the following components:    Color, UA Dark Yellow (*)     Appearance, UA Turbid (*)     Ketones, UA Trace (*)     Blood, UA Large (3+) (*)     Protein,  mg/dL (2+) (*)     Leuk Esterase, UA Large (3+) (*)     All other components within normal limits    Narrative:     In absence of clinical symptoms, the presence of pyuria, bacteria, and/or nitrites on the urinalysis result does not correlate with infection.   CBC WITH AUTO DIFFERENTIAL - Abnormal; Notable for the following components:    Lymphocyte % 18.2 (*)     All other components within normal limits   URINALYSIS, MICROSCOPIC ONLY - Abnormal; Notable for the following  components:    WBC, UA Too Numerous to Count (*)     Bacteria, UA 3+ (*)     Squamous Epithelial Cells, UA 7-12 (*)     All other components within normal limits   URINE CULTURE   CBC AND DIFFERENTIAL    Narrative:     The following orders were created for panel order CBC & Differential.  Procedure                               Abnormality         Status                     ---------                               -----------         ------                     CBC Auto Differential[[id number]]        Abnormal            Final result                 Please view results for these tests on the individual orders.     Medications   sodium chloride 0.9 % flush 10 mL (10 mL Intravenous Given 11/17/22 0850)   ondansetron (ZOFRAN) injection 4 mg (4 mg Intravenous Given 11/17/22 0851)   morphine injection 4 mg (4 mg Intravenous Given 11/17/22 0851)     CT Abdomen Pelvis Stone Protocol    Result Date: 11/17/2022   1. No acute findings within the abdomen or pelvis. 2. Chronic changes as described above.    Electronically Signed By-Yara Sanchez MD On:11/17/2022 10:04 AM This report was finalized on [id number] by  Yara Sanchez MD.    Prior to Admission medications    Medication Sig Start Date End Date Taking? Authorizing Provider   amLODIPine (NORVASC) 10 MG tablet Take 1 tablet by mouth Daily. 3/10/22   Andrews Ramírez MD   apixaban (ELIQUIS) 5 MG tablet tablet Take 1 tablet by mouth Every 12 (Twelve) Hours. Indications: Atrial Fibrillation 9/27/22   Andrews Ramírez MD   cefdinir (OMNICEF) 300 MG capsule Take 1 capsule by mouth 2 (Two) Times a Day for 5 days. 11/17/22 11/22/22  Marci Ivey APRN   cholecalciferol (VITAMIN D3) 25 MCG (1000 UT) tablet Take 1 tablet by mouth Daily. 3/10/22   Andrews Ramírez MD   FLUoxetine (PROzac) 20 MG capsule TAKE 3 CAPSULES BY MOUTH ONCE DAILY 9/2/22   Andrews Ramírez MD   hydroCHLOROthiazide (HYDRODIURIL) 12.5 MG tablet Take 1 tablet by mouth Daily for 30 days.  "3/22/22 4/21/22  Andrews Ramírez MD   HYDROcodone-acetaminophen (NORCO)  MG per tablet Take 1 tablet by mouth Every 6 (Six) Hours As Needed for Moderate Pain. 10/14/22   Andrews Ramírez MD   losartan (COZAAR) 100 MG tablet Take 1 tablet by mouth Daily for 90 days. Cancel the 25mg tablet on her profile please 3/10/22 6/8/22  Andrews Ramírez MD   nitroglycerin (NITROSTAT) 0.4 MG SL tablet Place 0.4 mg under the tongue Every 5 (Five) Minutes As Needed. do not exceed a total of 3 doses in 15 minutes 5/11/22   ProviderWhitley MD   phenazopyridine (PYRIDIUM) 200 MG tablet Take 1 tablet by mouth 3 (Three) Times a Day As Needed for Bladder Spasms. 11/17/22   Marci Ivey APRN   rosuvastatin (CRESTOR) 5 MG tablet Take 1 tablet by mouth Every Night for 90 days. 6/7/22 9/5/22  Andrews Ramírez MD   vitamin B-12 (CYANOCOBALAMIN) 1000 MCG tablet Take 1 tablet by mouth Daily. 3/10/22   Andrews Ramírez MD   amoxicillin-clavulanate (Augmentin) 875-125 MG per tablet Take 1 tablet by mouth 2 (Two) Times a Day. 10/24/22 11/17/22  Jenny Carranza MD                                          MDM  Number of Diagnoses or Management Options  Flank pain  Urinary tract infection without hematuria, site unspecified  Diagnosis management comments: /67 (BP Location: Left arm, Patient Position: Lying)   Pulse 66   Temp 99.3 °F (37.4 °C) (Oral)   Resp 17   Ht 162.6 cm (64\")   Wt 74.6 kg (164 lb 7.4 oz)   SpO2 94%   BMI 28.23 kg/m²          Comorbidities:  has a past medical history of Abnormal ECG (04/13/2021), Anxiety (May 2021), Atrial fibrillation (HCC), Atrial flutter (HCC), Cataract (2020), COVID-19 (06/03/2021), Hyperlipidemia, Hypertension, Hyperthyroidism, Low back pain, Neck pain, Tremor ([id number]), and Visual impairment (1990).  Differentials: Pyelonephritis kidney stone UTI not all inclusive of differentials considered  Radiology interpretation:  X-rays reviewed by me and " interpreted by radiologist,   CT Abdomen Pelvis Stone Protocol    Result Date: 11/17/2022   1. No acute findings within the abdomen or pelvis. 2. Chronic changes as described above.    Electronically Signed By-Yara Sanchez MD On:11/17/2022 10:04 AM This report was finalized on [id number] by  Yara Sanchez MD.    Lab interpretation:  Labs viewed by me significant for, uti with culture pending    Appropriate PPE worn during exam.  Patient had an IV established was given antiemetics analgesics urine labs and CT was obtained.  On reexam she is resting comfortably in the bed and states she continues to have some mild right-sided flank pain but denies any other complaints and states she is ready to go home.  She will be discharged home with Omnicef and Pyridium we discussed the importance of follow-up with PCP.  Urine culture is pending    i discussed findings with patient who voices understanding of discharge instructions, signs and symptoms requiring return to ED; discharged improved and in stable condition with follow up for re-evaluation.   This document is intended for medical expert use only. Reading of this document by patients and/or patient's family without participating medical staff guidance may result in misinterpretation and unintended morbidity.  Any interpretation of such data is the responsibility of the patient and/or family member responsible for the patient in concert with their primary or specialist providers, not to be left for sources of online searches such as [employer], [employer] or similar queries. Relying on these approaches to knowledge may result in misinterpretation, misguided goals of care and even death should patients or family members try recommendations outside of the realm of professional medical care in a supervised inpatient environment.         Final diagnoses:   Urinary tract infection without hematuria, site unspecified   Flank pain       ED Disposition  ED Disposition     ED  Disposition   Discharge    Condition   Stable    Comment   --             Andrews Ramírez MD  [address]  [place] [address] [phone]    Schedule an appointment as soon as possible for a visit            Medication List      New Prescriptions    cefdinir 300 MG capsule  Commonly known as: OMNICEF  Take 1 capsule by mouth 2 (Two) Times a Day for 5 days.     phenazopyridine 200 MG tablet  Commonly known as: PYRIDIUM  Take 1 tablet by mouth 3 (Three) Times a Day As Needed for Bladder Spasms.           Where to Get Your Medications      These medications were sent to [facility] [address]  [place], [place] [address] - [phone] [place] [phone] [address]  [place] [address]    Phone: [phone]   · cefdinir 300 MG capsule  · phenazopyridine 200 MG tablet          Marci Ivey, APRN  11/17/22 1111

## 2022-11-19 LAB — BACTERIA SPEC AEROBE CULT: ABNORMAL

## 2022-11-19 NOTE — PHARMACY RECOMMENDATION
Patient urine culture resulted with E. coli. Susceptible to Cephalosporins (3rd gen). Patient was given Rx for cefdinir. Therapy is appropriate coverage. No further follow-up required..    Microbiology Results (last 10 days)       Procedure Component Value - Date/Time    Urine Culture - Urine, Urine, Catheter [[id number]]  (Abnormal)  (Susceptibility) Collected: 11/17/22 0912    Lab Status: Final result Specimen: Urine, Catheter Updated: 11/19/22 1240     Urine Culture >100,000 CFU/mL Escherichia coli    Narrative:      Colonization of the urinary tract without infection is common. Treatment is discouraged unless the patient is symptomatic, pregnant, or undergoing an invasive urologic procedure.    Susceptibility        Escherichia coli      SCARLETT      Ampicillin Susceptible      Ampicillin + Sulbactam Susceptible      Cefazolin Susceptible      Cefepime Susceptible      Ceftazidime Susceptible      Ceftriaxone Susceptible      Gentamicin Susceptible      Levofloxacin Susceptible      Nitrofurantoin Susceptible      Piperacillin + Tazobactam Susceptible      Trimethoprim + Sulfamethoxazole Susceptible                                   Flaquita Padgett RPH  11/19/2022 13:07 EST

## 2022-11-21 DIAGNOSIS — N39.0 URINARY TRACT INFECTION WITH HEMATURIA, SITE UNSPECIFIED: Primary | ICD-10-CM

## 2022-11-21 DIAGNOSIS — M54.16 LUMBAR RADICULAR PAIN: ICD-10-CM

## 2022-11-21 DIAGNOSIS — M54.50 LUMBAR SPINE PAIN: ICD-10-CM

## 2022-11-21 DIAGNOSIS — R31.9 URINARY TRACT INFECTION WITH HEMATURIA, SITE UNSPECIFIED: Primary | ICD-10-CM

## 2022-11-21 RX ORDER — HYDROCODONE BITARTRATE AND ACETAMINOPHEN 10; 325 MG/1; MG/1
1 TABLET ORAL EVERY 6 HOURS PRN
Qty: 120 TABLET | Refills: 0 | Status: SHIPPED | OUTPATIENT
Start: 2022-11-21 | End: 2022-12-24 | Stop reason: SDUPTHER

## 2022-11-23 RX ORDER — METHYLPREDNISOLONE 4 MG/1
TABLET ORAL
Qty: 21 TABLET | Refills: 0 | Status: SHIPPED | OUTPATIENT
Start: 2022-11-23 | End: 2022-12-07

## 2022-11-27 DIAGNOSIS — E78.2 MIXED HYPERLIPIDEMIA: ICD-10-CM

## 2022-11-27 DIAGNOSIS — I10 ESSENTIAL HYPERTENSION: ICD-10-CM

## 2022-11-27 DIAGNOSIS — I48.0 PAROXYSMAL ATRIAL FIBRILLATION: ICD-10-CM

## 2022-11-27 DIAGNOSIS — R00.2 PALPITATIONS: ICD-10-CM

## 2022-11-27 RX ORDER — FLUOXETINE HYDROCHLORIDE 20 MG/1
60 CAPSULE ORAL DAILY
Qty: 270 CAPSULE | Refills: 2 | Status: SHIPPED | OUTPATIENT
Start: 2022-11-27 | End: 2023-02-25

## 2022-11-27 RX ORDER — LOSARTAN POTASSIUM 100 MG/1
100 TABLET ORAL DAILY
Qty: 90 TABLET | Refills: 3 | Status: SHIPPED | OUTPATIENT
Start: 2022-11-27 | End: 2023-02-25

## 2022-12-06 NOTE — PROGRESS NOTES
Date of Office Visit: 2022  Encounter Provider: Dr.Syed Berg  Place of Service: [facility] CARDIOLOGY [place]  Patient Name: Katarina Phillips  :1955  Andrews Ramírez MD    Chief Complaint   Patient presents with   • Atrial Fibrillation   • Hypertension   • Hyperlipidemia   • Diabetes   • Follow-up     History of Present Illness    I am pleased to see Mrs. Phillips in my office today as a follow-up.    As you know, patient is 67-year-old white female whose past medical history is significant for atrial flutter, who came today for follow-up after recent hospital admission and discharge.    In May 2021, patient was admitted at [facility] with symptom of palpitation and gastroenteritis.  Patient was found to be in atrial flutter with rapid ventricular response.  Patient underwent ablation.  But patient had recurrence and had to had repeat atrial flutter ablative therapy.  Patient also underwent cardiac catheterization because of ongoing chest pain which showed nonobstructive CAD.  Echocardiogram showed normal left ventricle size and function with EF of 55%.    Since the previous visit, patient is overall doing well.  Patient unfortunately has advanced osteoarthritis of spine.  Patient is complaining of generalized body aches and pain.  Patient also complain of fatigue.  Patient reports that she gets tired and fatigued in the middle of the day.  She denies any chest pain.  No recurrence of atrial flutter.  No chest fluttering.  No leg edema noted.    EKG showed sinus rhythm inferior Q waves noted.    I will proceed with current treatment blood pressure is very well controlled patient has not had any recurrence of atrial fibrillation/flutter after ablation.  If patient continues to maintain sinus rhythm and I would recommend at that time to discontinue Eliquis.          Past Medical History:   Diagnosis Date   • Abnormal ECG 2021   • Anxiety May 2021   • Atrial fibrillation  (HCC)    • Atrial flutter (HCC)    • Cataract 2020   • COVID-19 06/03/2021   • Hyperlipidemia    • Hypertension    • Hyperthyroidism    • Low back pain    • Neck pain    • Tremor [id number]   • Visual impairment 1990         Past Surgical History:   Procedure Laterality Date   • ABLATION OF DYSRHYTHMIC FOCUS  4-13-21- / 5-11-21   • BACK SURGERY     • CARDIAC CATHETERIZATION N/A 05/11/2021    Procedure: Left Heart Cath;  Surgeon: Olman Berg MD;  Location:  [facility] CATH INVASIVE LOCATION;  Service: Cardiovascular;  Laterality: N/A;   • CARDIAC ELECTROPHYSIOLOGY PROCEDURE N/A 04/14/2021    Procedure: EP/Ablation;  Surgeon: Ori Tran MD;  Location:  [facility] CATH INVASIVE LOCATION;  Service: Cardiovascular;  Laterality: N/A;   • CARDIAC ELECTROPHYSIOLOGY PROCEDURE N/A 05/11/2021    Procedure: EP/Ablation;  Surgeon: Ori Tran MD;  Location:  [facility] CATH INVASIVE LOCATION;  Service: Cardiovascular;  Laterality: N/A;   • CATARACT EXTRACTION     • COLONOSCOPY  2014   • NECK SURGERY     • ORTHOPEDIC SURGERY      mx foot breaks with surgical repair/wrist surgery   • TUBAL ABDOMINAL LIGATION  1989           Current Outpatient Medications:   •  amLODIPine (NORVASC) 10 MG tablet, Take 1 tablet by mouth Daily., Disp: 90 tablet, Rfl: 3  •  apixaban (ELIQUIS) 5 MG tablet tablet, Take 1 tablet by mouth Every 12 (Twelve) Hours for 90 days. Indications: Atrial Fibrillation, Disp: 180 tablet, Rfl: 3  •  FLUoxetine (PROzac) 20 MG capsule, Take 3 capsules by mouth Daily for 90 days., Disp: 270 capsule, Rfl: 2  •  hydroCHLOROthiazide (HYDRODIURIL) 12.5 MG tablet, Take 12.5 mg by mouth Daily., Disp: , Rfl:   •  HYDROcodone-acetaminophen (NORCO)  MG per tablet, Take 1 tablet by mouth Every 6 (Six) Hours As Needed for Moderate Pain., Disp: 120 tablet, Rfl: 0  •  losartan (COZAAR) 100 MG tablet, Take 1 tablet by mouth Daily for 90 days. Cancel the 25mg tablet on her profile please, Disp: 90 tablet, Rfl: 3  •   nitroglycerin (NITROSTAT) 0.4 MG SL tablet, Place 0.4 mg under the tongue Every 5 (Five) Minutes As Needed. do not exceed a total of 3 doses in 15 minutes, Disp: , Rfl:   •  rosuvastatin (CRESTOR) 5 MG tablet, Take 5 mg by mouth Daily., Disp: , Rfl:       Social History     Socioeconomic History   • Marital status:    Tobacco Use   • Smoking status: Former     Packs/day: 1.00     Years: 12.00     Pack years: 12.00     Types: Cigarettes     Start date: 1980     Quit date: 1992     Years since quittin.6   • Smokeless tobacco: Never   • Tobacco comments:     Have not smoked in over 30 years +   Vaping Use   • Vaping Use: Never used   Substance and Sexual Activity   • Alcohol use: Never   • Drug use: Not Currently     Frequency: 1.0 times per week     Types: Marijuana     Comment: Tried eatable once/to replace Opioid  use of 30 years   • Sexual activity: Not Currently     Partners: Male     Birth control/protection: None     Comment: Don't need birth control         Review of Systems   Constitutional: Negative for chills and fever.   HENT: Negative for ear discharge and nosebleeds.    Eyes: Negative for discharge and redness.   Cardiovascular: Negative for chest pain, orthopnea, palpitations, paroxysmal nocturnal dyspnea and syncope.   Respiratory: Negative for cough, shortness of breath and wheezing.    Endocrine: Negative for heat intolerance.   Skin: Negative for rash.   Musculoskeletal: Negative for arthritis and myalgias.   Gastrointestinal: Negative for abdominal pain, melena, nausea and vomiting.   Genitourinary: Negative for dysuria and hematuria.   Neurological: Negative for dizziness, light-headedness, numbness and tremors.   Psychiatric/Behavioral: Negative for depression. The patient is not nervous/anxious.        Procedures      ECG 12 Lead    Date/Time: 2022 12:45 PM  Performed by: Olman Berg MD  Authorized by: Olman Berg MD   Comparison: compared with previous ECG   Similar  "to previous ECG  Rhythm: sinus rhythm    Clinical impression: normal ECG            ECG 12 Lead    (Results Pending)           Objective:    /67 (BP Location: Right arm, Patient Position: Sitting, Cuff Size: Large Adult)   Pulse 74   Ht 162.6 cm (64.02\")   Wt 74.4 kg (164 lb)   SpO2 99%   BMI 28.14 kg/m²         Constitutional:       Appearance: Well-developed.   Eyes:      General: No scleral icterus.        Right eye: No discharge.   HENT:      Head: Normocephalic and atraumatic.   Neck:      Thyroid: No thyromegaly.      Lymphadenopathy: No cervical adenopathy.   Pulmonary:      Effort: Pulmonary effort is normal. No respiratory distress.      Breath sounds: Normal breath sounds. No wheezing. No rales.   Cardiovascular:      Normal rate. Regular rhythm.      No gallop.   Abdominal:      Tenderness: There is no abdominal tenderness.   Skin:     Findings: No erythema or rash.   Neurological:      Mental Status: Alert and oriented to person, place, and time.             Assessment:       Diagnosis Plan   1. Mixed hyperlipidemia  ECG 12 Lead      2. Palpitations  ECG 12 Lead      3. Essential hypertension  ECG 12 Lead      4. Paroxysmal atrial fibrillation (HCC)  ECG 12 Lead               Plan:       MDM:    1.  Paroxysmal atrial flutter:    Patient underwent ablation and since then been doing Very well.  I would consider stopping Eliquis on next visit    2.  Hypertension:    Blood pressure is very well controlled current treatment would be continued.    3.  Hyperlipidemia:    Patient is on Crestor.  Recent LDL is 167.  I would recommend to increase Crestor to 10 mg daily.  "

## 2022-12-07 ENCOUNTER — OFFICE VISIT (OUTPATIENT)
Dept: CARDIOLOGY | Facility: CLINIC | Age: 67
End: 2022-12-07

## 2022-12-07 VITALS
SYSTOLIC BLOOD PRESSURE: 123 MMHG | HEART RATE: 74 BPM | DIASTOLIC BLOOD PRESSURE: 67 MMHG | BODY MASS INDEX: 28 KG/M2 | OXYGEN SATURATION: 99 % | WEIGHT: 164 LBS | HEIGHT: 64 IN

## 2022-12-07 DIAGNOSIS — I48.0 PAROXYSMAL ATRIAL FIBRILLATION: ICD-10-CM

## 2022-12-07 DIAGNOSIS — E78.2 MIXED HYPERLIPIDEMIA: Primary | ICD-10-CM

## 2022-12-07 DIAGNOSIS — I10 ESSENTIAL HYPERTENSION: ICD-10-CM

## 2022-12-07 DIAGNOSIS — R00.2 PALPITATIONS: ICD-10-CM

## 2022-12-07 PROCEDURE — 99214 OFFICE O/P EST MOD 30 MIN: CPT | Performed by: INTERNAL MEDICINE

## 2022-12-07 PROCEDURE — 93000 ELECTROCARDIOGRAM COMPLETE: CPT | Performed by: INTERNAL MEDICINE

## 2022-12-07 RX ORDER — ROSUVASTATIN CALCIUM 5 MG/1
5 TABLET, COATED ORAL DAILY
COMMUNITY

## 2022-12-07 RX ORDER — HYDROCHLOROTHIAZIDE 12.5 MG/1
12.5 TABLET ORAL DAILY
COMMUNITY

## 2022-12-21 ENCOUNTER — TELEPHONE (OUTPATIENT)
Dept: FAMILY MEDICINE CLINIC | Facility: CLINIC | Age: 67
End: 2022-12-21

## 2022-12-21 NOTE — TELEPHONE ENCOUNTER
Gave message to patient at 1:25pm and scheduled an appt with PMJ on 02/20/2023 at 9am.    Dr MIRAMONTES - Please refill her pain medication.

## 2022-12-21 NOTE — TELEPHONE ENCOUNTER
----- Message from Katarina Phillips sent at 12/21/2022  9:39 AM EST -----  Regarding: Pain Medication   Contact: [phone]  Greetings Dr. REED, Rhoda,    If you would be so kind as to refill my pain medication I would truly appreciate it.  My health situation has not changed, I am always in pain!  It seems as I get older it is getting worse.  Would you like me to schedule an appointment?  As discussed not much you can do medically for me...  Hope you have a wonderful Estrella holiday, I will be stopping by today for a brief minute to drop off a box.    Thank you for all you have done for us [facility]...  Katarina

## 2022-12-24 DIAGNOSIS — M54.50 LUMBAR SPINE PAIN: ICD-10-CM

## 2022-12-24 DIAGNOSIS — M54.16 LUMBAR RADICULAR PAIN: ICD-10-CM

## 2022-12-24 PROBLEM — M47.816 LUMBAR SPONDYLOSIS: Status: ACTIVE | Noted: 2022-12-24

## 2022-12-24 RX ORDER — HYDROCODONE BITARTRATE AND ACETAMINOPHEN 10; 325 MG/1; MG/1
1 TABLET ORAL EVERY 6 HOURS PRN
Qty: 120 TABLET | Refills: 0 | Status: SHIPPED | OUTPATIENT
Start: 2022-12-24 | End: 2023-02-01 | Stop reason: SDUPTHER

## 2022-12-28 ENCOUNTER — TELEPHONE (OUTPATIENT)
Dept: FAMILY MEDICINE CLINIC | Facility: CLINIC | Age: 67
End: 2022-12-28

## 2022-12-28 DIAGNOSIS — Z98.1 S/P LUMBAR FUSION: ICD-10-CM

## 2022-12-28 DIAGNOSIS — Z98.890 S/P CERVICAL DISCECTOMY: ICD-10-CM

## 2022-12-28 DIAGNOSIS — M47.816 LUMBAR SPONDYLOSIS: ICD-10-CM

## 2022-12-28 DIAGNOSIS — M54.2 NECK PAIN, BILATERAL: Primary | ICD-10-CM

## 2022-12-28 RX ORDER — OXYCODONE AND ACETAMINOPHEN 7.5; 325 MG/1; MG/1
1 TABLET ORAL EVERY 6 HOURS PRN
Qty: 120 TABLET | Refills: 0 | Status: SHIPPED | OUTPATIENT
Start: 2022-12-28 | End: 2023-01-27

## 2022-12-28 NOTE — TELEPHONE ENCOUNTER
Patient is unable to fill the Norco 10-325mg Rx sent to [facility] on 12/24/22.    [facility] and the other local pharmacies are out of Hydrocodone.  Item is backordered for approximately 3 weeks.  Pharmacist said they have Oxycodone 7.5mg available if you are willing to change her Rx.    Pharmacy: [facility]

## 2023-02-01 DIAGNOSIS — M54.50 LUMBAR SPINE PAIN: ICD-10-CM

## 2023-02-01 DIAGNOSIS — M54.16 LUMBAR RADICULAR PAIN: ICD-10-CM

## 2023-02-01 RX ORDER — HYDROCODONE BITARTRATE AND ACETAMINOPHEN 10; 325 MG/1; MG/1
1 TABLET ORAL EVERY 6 HOURS PRN
Qty: 120 TABLET | Refills: 0 | Status: SHIPPED | OUTPATIENT
Start: 2023-02-01 | End: 2023-03-04 | Stop reason: SDUPTHER

## 2023-02-20 ENCOUNTER — OFFICE VISIT (OUTPATIENT)
Dept: FAMILY MEDICINE CLINIC | Facility: CLINIC | Age: 68
End: 2023-02-20
Payer: MEDICARE

## 2023-02-20 VITALS
BODY MASS INDEX: 29.02 KG/M2 | RESPIRATION RATE: 16 BRPM | OXYGEN SATURATION: 98 % | HEART RATE: 81 BPM | SYSTOLIC BLOOD PRESSURE: 130 MMHG | WEIGHT: 170 LBS | HEIGHT: 64 IN | DIASTOLIC BLOOD PRESSURE: 80 MMHG

## 2023-02-20 DIAGNOSIS — G89.4 CHRONIC PAIN DISORDER: Primary | ICD-10-CM

## 2023-02-20 DIAGNOSIS — I48.0 PAROXYSMAL ATRIAL FIBRILLATION: ICD-10-CM

## 2023-02-20 DIAGNOSIS — M62.838 TRAPEZIUS MUSCLE SPASM: ICD-10-CM

## 2023-02-20 DIAGNOSIS — M47.812 CERVICAL SPONDYLOSIS: ICD-10-CM

## 2023-02-20 DIAGNOSIS — Z98.890 S/P CERVICAL DISCECTOMY: ICD-10-CM

## 2023-02-20 DIAGNOSIS — M21.372 FOOT DROP, LEFT: ICD-10-CM

## 2023-02-20 DIAGNOSIS — M54.16 LUMBAR RADICULAR PAIN: ICD-10-CM

## 2023-02-20 PROCEDURE — 99213 OFFICE O/P EST LOW 20 MIN: CPT | Performed by: FAMILY MEDICINE

## 2023-02-20 RX ORDER — BUPROPION HYDROCHLORIDE 150 MG/1
150 TABLET, EXTENDED RELEASE ORAL 2 TIMES DAILY
Qty: 60 TABLET | Refills: 6 | Status: SHIPPED | OUTPATIENT
Start: 2023-02-20 | End: 2023-03-22

## 2023-02-20 NOTE — PROGRESS NOTES
Chief Complaint  Neck Pain (Med f/u) and Back Pain    Subjective      Katarina Phillips presents to [facility]  History of Present Illness  For follow up on back pain.   Neck Pain   Associated symptoms include headaches, leg pain, numbness and weakness.   Back Pain  This is a chronic problem. The current episode started more than 1 year ago. The problem occurs constantly. The problem has been rapidly worsening since onset. The pain is present in the lumbar spine. The quality of the pain is described as stabbing. The pain radiates to the left thigh. The pain is at a severity of 9/10. The pain is the same all the time. The symptoms are aggravated by bending, lying down, sitting, standing and twisting. Stiffness is present all day. Associated symptoms include headaches, leg pain, numbness and weakness.       The patient presents today for a follow-up.    The patient's back pain is worsening in bilateral shoulders, neck and arms.  She wakes up in pain constantly and the pain used to resolve; however, it does not resolve. The pain wakes her up in the middle of the night. She does not take medication. The pain is in her lower back and radiates down her bilateral lower extremities, left worse than right. The pain occasionally radiates into her left foot. She has paralysis in her left lower extremity. She is unable to lift his toes. She is able to stand on her toes; however, she falls down frequently. She has not felt this in 30 years. The patient's hands are constantly numb. They are weak and numb. She has received injections in her back. She was referred to a neurosurgeon; however, she does not want to go down that road again and her last neurosurgeon was Dr. Sanchez. She was told that she could do one thing for her and that is put a spine stimulator inside of her body and place it on like an octopus all these different wires and shock herself. She has tried acupuncture and chiropractic  "care in the past; however, it was \"awful\". She can feel her atrial fibrillation.    The patient takes Prozac 60 mg 3 capsules per day. She is unsure if he should increase the dosage.  She is \"under the gun\". She has constant stress.      Objective   Vital Signs:  /80 (BP Location: Right arm, Cuff Size: Adult)   Pulse 81   Resp 16   Ht 162.6 cm (64\")   Wt 77.1 kg (170 lb)   SpO2 98%   BMI 29.18 kg/m²   Estimated body mass index is 29.18 kg/m² as calculated from the following:    Height as of this encounter: 162.6 cm (64\").    Weight as of this encounter: 77.1 kg (170 lb).             Physical Exam  Constitutional:       Appearance: Normal appearance. She is well-developed and normal weight.   HENT:      Head: Normocephalic and atraumatic.      Right Ear: Tympanic membrane, ear canal and external ear normal.      Left Ear: Tympanic membrane, ear canal and external ear normal.      Nose: Nose normal.      Mouth/Throat:      Mouth: Mucous membranes are moist.      Pharynx: Oropharynx is clear. No oropharyngeal exudate.   Eyes:      Extraocular Movements: Extraocular movements intact.      Conjunctiva/sclera: Conjunctivae normal.      Pupils: Pupils are equal, round, and reactive to light.   Cardiovascular:      Rate and Rhythm: Normal rate and regular rhythm.      Pulses: Normal pulses.      Heart sounds: Normal heart sounds.   Pulmonary:      Effort: Pulmonary effort is normal.      Breath sounds: Normal breath sounds.   Abdominal:      General: Bowel sounds are normal.      Palpations: Abdomen is soft.   Musculoskeletal:         General: Normal range of motion.      Cervical back: Normal range of motion and neck supple.   Skin:     General: Skin is warm and dry.   Neurological:      General: No focal deficit present.      Mental Status: She is alert and oriented to person, place, and time. Mental status is at baseline.   Psychiatric:         Mood and Affect: Mood normal.         Behavior: Behavior normal.  "        Thought Content: Thought content normal.         Judgment: Judgment normal.          Assessment and Plan   1. Chronic pain disorder  - Katarina is a 67-year-old white female who has had severe muscle and back pain for several years and has had multiple surgeries on both her lumbar and cervical spine. She has now pretty much run out of all surgical options and sees Dr. Sanchez for pain management. They are trying their best to give her some relief in her neck and trapezius muscles bilaterally and in her lower back and particularly her radicular pain down her left leg. Unfortunately, the pain is unrelenting despite everything they have tried and she is left with not much more to do from a surgical standpoint and only management of her pain is really our only option at this point.    2. Left foot drop  - The patient has had a chronic left foot drop from previous surgery on her back. She has had the foot drop for many years and it is not getting any better. She can toe up on that toe, but she cannot lift the toes up dorsally. She catches the foot frequently and is at high risk of falling.    3. Lumbar radicular pain  - The patient has pain in her lower back with pain down both legs, but mainly on the left. It will at times shoot down into the left foot.    4. Status post cervical discectomy  - The patient has had cervical spine disease and she has had surgery. She is now left with pain in the middle of her spine in the cervical area radiating into both traps and both shoulder girdles. The pain is intense daily, unrelenting and she really does not get any relief no matter what she does. Pain medicine that she takes gives her some temporary diminishment of the pain, but it never takes the pain away completely. The pain medicine allows her to get up without suffering too much and allows her to take care of her grandchild and her , but it does not relieve the pain significantly. It basically minimizes the  amount of suffering that she is doing with the pain.    5. Trapezius muscle spasm  - As mentioned, it is mainly muscular pain that she is feeling at this time. I am going to send her to a facility downtown [place] that does acupuncture. I am also going to ask her to consider craniosacral therapy and try some of these nontraditional therapies and see if they can give her any relief of this unrelenting discomfort.    6. Paroxysmal atrial fibrillation  - The patient does have atrial fibrillation. Currently, she is in sinus, but atrial fibrillation comes and goes. She will stay on her blood thinner for now.         Follow Up Return in about 6 months (around 8/20/2023) for Medicare Wellness with labs the week before.  Patient was given instructions and counseling regarding her condition or for health maintenance advice. Please see specific information pulled into the AVS if appropriate.       Answers for HPI/ROS submitted by the patient on 2/13/2023  What is the primary reason for your visit?: Back Pain        Transcribed from ambient dictation for Andrews Ramírez MD by Krissy Avila.  02/20/23   10:41 EST    Patient or patient representative verbalized consent to the visit recording.  I have personally performed the services described in this document as transcribed by the above individual, and it is both accurate and complete.  Andrews Ramírez MD  2/26/2023  20:01 EST

## 2023-03-04 DIAGNOSIS — M54.50 LUMBAR SPINE PAIN: ICD-10-CM

## 2023-03-04 DIAGNOSIS — M54.16 LUMBAR RADICULAR PAIN: ICD-10-CM

## 2023-03-04 RX ORDER — HYDROCODONE BITARTRATE AND ACETAMINOPHEN 10; 325 MG/1; MG/1
1 TABLET ORAL EVERY 6 HOURS PRN
Qty: 120 TABLET | Refills: 0 | Status: SHIPPED | OUTPATIENT
Start: 2023-03-04 | End: 2023-04-09 | Stop reason: SDUPTHER

## 2023-04-09 DIAGNOSIS — M54.50 LUMBAR SPINE PAIN: ICD-10-CM

## 2023-04-09 DIAGNOSIS — M54.16 LUMBAR RADICULAR PAIN: ICD-10-CM

## 2023-04-09 RX ORDER — HYDROCODONE BITARTRATE AND ACETAMINOPHEN 10; 325 MG/1; MG/1
1 TABLET ORAL EVERY 6 HOURS PRN
Qty: 120 TABLET | Refills: 0 | Status: SHIPPED | OUTPATIENT
Start: 2023-04-09 | End: 2023-05-09

## 2023-05-13 DIAGNOSIS — M54.50 LUMBAR SPINE PAIN: ICD-10-CM

## 2023-05-13 DIAGNOSIS — M54.16 LUMBAR RADICULAR PAIN: ICD-10-CM

## 2023-05-13 RX ORDER — HYDROCODONE BITARTRATE AND ACETAMINOPHEN 10; 325 MG/1; MG/1
1 TABLET ORAL EVERY 6 HOURS PRN
Qty: 120 TABLET | Refills: 0 | Status: SHIPPED | OUTPATIENT
Start: 2023-05-13 | End: 2023-06-12

## 2023-05-30 RX ORDER — AMLODIPINE BESYLATE 10 MG/1
TABLET ORAL
Qty: 90 TABLET | Refills: 0 | Status: SHIPPED | OUTPATIENT
Start: 2023-05-30

## 2023-06-14 DIAGNOSIS — G89.4 CHRONIC PAIN DISORDER: Primary | ICD-10-CM

## 2023-06-14 DIAGNOSIS — M47.812 CERVICAL SPONDYLOSIS: ICD-10-CM

## 2023-06-14 DIAGNOSIS — M54.2 NECK PAIN, BILATERAL: ICD-10-CM

## 2023-06-14 RX ORDER — HYDROCODONE BITARTRATE AND ACETAMINOPHEN 10; 325 MG/1; MG/1
1 TABLET ORAL EVERY 6 HOURS PRN
Qty: 120 TABLET | Refills: 0 | Status: SHIPPED | OUTPATIENT
Start: 2023-06-14

## 2023-06-14 NOTE — TELEPHONE ENCOUNTER
----- Message from Katarina Phillips sent at 6/14/2023 10:11 AM EDT -----  Regarding: Pain Medication Request  Contact: [phone]  Greetings Rhoda Fletcher,    Hope your doing well, if you would be so kind as to refill my pain medication, nothing has changed for me.  I have an appointment on the 21st of June as required for this type of mediation.  I thank you for your consideration in this matter.    Best Regards,  Katarina Phillips

## 2023-07-21 ENCOUNTER — LAB (OUTPATIENT)
Dept: FAMILY MEDICINE CLINIC | Facility: CLINIC | Age: 68
End: 2023-07-21
Payer: MEDICARE

## 2023-07-21 DIAGNOSIS — R79.9 ABNORMAL FINDING OF BLOOD CHEMISTRY, UNSPECIFIED: ICD-10-CM

## 2023-07-21 DIAGNOSIS — M54.50 LUMBAR SPINE PAIN: ICD-10-CM

## 2023-07-21 DIAGNOSIS — G93.32 MYALGIC ENCEPHALOMYELITIS/CHRONIC FATIGUE SYNDROME (ME/CFS): ICD-10-CM

## 2023-07-21 DIAGNOSIS — I48.92 ATRIAL FLUTTER WITH RAPID VENTRICULAR RESPONSE: ICD-10-CM

## 2023-07-21 DIAGNOSIS — R00.2 PALPITATIONS: ICD-10-CM

## 2023-07-21 DIAGNOSIS — F34.1 DYSTHYMIA: ICD-10-CM

## 2023-07-21 DIAGNOSIS — I10 ESSENTIAL HYPERTENSION: ICD-10-CM

## 2023-07-21 DIAGNOSIS — I48.0 PAROXYSMAL ATRIAL FIBRILLATION: ICD-10-CM

## 2023-07-21 DIAGNOSIS — I20.0 UNSTABLE ANGINA: ICD-10-CM

## 2023-07-21 DIAGNOSIS — E78.2 MIXED HYPERLIPIDEMIA: ICD-10-CM

## 2023-07-21 DIAGNOSIS — E55.9 VITAMIN D DEFICIENCY, UNSPECIFIED: ICD-10-CM

## 2023-07-21 LAB
25(OH)D3 SERPL-MCNC: 29.3 NG/ML (ref 30–100)
ALBUMIN SERPL-MCNC: 4.5 G/DL (ref 3.5–5.2)
ALBUMIN/GLOB SERPL: 2.3 G/DL
ALP SERPL-CCNC: 101 U/L (ref 39–117)
ALT SERPL W P-5'-P-CCNC: 20 U/L (ref 1–33)
ANION GAP SERPL CALCULATED.3IONS-SCNC: 10 MMOL/L (ref 5–15)
AST SERPL-CCNC: 22 U/L (ref 1–32)
BACTERIA UR QL AUTO: ABNORMAL /HPF
BASOPHILS # BLD AUTO: 0.08 10*3/MM3 (ref 0–0.2)
BASOPHILS NFR BLD AUTO: 1 % (ref 0–1.5)
BILIRUB SERPL-MCNC: 0.2 MG/DL (ref 0–1.2)
BILIRUB UR QL STRIP: NEGATIVE
BUN SERPL-MCNC: 15 MG/DL (ref 8–23)
BUN/CREAT SERPL: 21.1 (ref 7–25)
CALCIUM SPEC-SCNC: 9 MG/DL (ref 8.6–10.5)
CHLORIDE SERPL-SCNC: 103 MMOL/L (ref 98–107)
CHOLEST SERPL-MCNC: 213 MG/DL (ref 0–200)
CLARITY UR: CLEAR
CO2 SERPL-SCNC: 27 MMOL/L (ref 22–29)
COLOR UR: ABNORMAL
CREAT SERPL-MCNC: 0.71 MG/DL (ref 0.57–1)
DEPRECATED RDW RBC AUTO: 41.1 FL (ref 37–54)
EGFRCR SERPLBLD CKD-EPI 2021: 92.7 ML/MIN/1.73
EOSINOPHIL # BLD AUTO: 0.15 10*3/MM3 (ref 0–0.4)
EOSINOPHIL NFR BLD AUTO: 1.9 % (ref 0.3–6.2)
ERYTHROCYTE [DISTWIDTH] IN BLOOD BY AUTOMATED COUNT: 13.1 % (ref 12.3–15.4)
ERYTHROCYTE [SEDIMENTATION RATE] IN BLOOD: 11 MM/HR (ref 0–30)
FERRITIN SERPL-MCNC: 54.9 NG/ML (ref 13–150)
GLOBULIN UR ELPH-MCNC: 2 GM/DL
GLUCOSE SERPL-MCNC: 102 MG/DL (ref 65–99)
GLUCOSE UR STRIP-MCNC: NEGATIVE MG/DL
HBA1C MFR BLD: 6 % (ref 4.8–5.6)
HCT VFR BLD AUTO: 42.7 % (ref 34–46.6)
HDLC SERPL-MCNC: 44 MG/DL (ref 40–60)
HGB BLD-MCNC: 14 G/DL (ref 12–15.9)
HGB UR QL STRIP.AUTO: NEGATIVE
HYALINE CASTS UR QL AUTO: ABNORMAL /LPF
IMM GRANULOCYTES # BLD AUTO: 0.04 10*3/MM3 (ref 0–0.05)
IMM GRANULOCYTES NFR BLD AUTO: 0.5 % (ref 0–0.5)
IRON 24H UR-MRATE: 68 MCG/DL (ref 37–145)
IRON SATN MFR SERPL: 16 % (ref 20–50)
KETONES UR QL STRIP: ABNORMAL
LDLC SERPL CALC-MCNC: 119 MG/DL (ref 0–100)
LDLC/HDLC SERPL: 2.54 {RATIO}
LEUKOCYTE ESTERASE UR QL STRIP.AUTO: ABNORMAL
LYMPHOCYTES # BLD AUTO: 2.16 10*3/MM3 (ref 0.7–3.1)
LYMPHOCYTES NFR BLD AUTO: 26.7 % (ref 19.6–45.3)
MCH RBC QN AUTO: 28.1 PG (ref 26.6–33)
MCHC RBC AUTO-ENTMCNC: 32.8 G/DL (ref 31.5–35.7)
MCV RBC AUTO: 85.6 FL (ref 79–97)
MONOCYTES # BLD AUTO: 0.67 10*3/MM3 (ref 0.1–0.9)
MONOCYTES NFR BLD AUTO: 8.3 % (ref 5–12)
MUCOUS THREADS URNS QL MICRO: ABNORMAL /HPF
NEUTROPHILS NFR BLD AUTO: 4.99 10*3/MM3 (ref 1.7–7)
NEUTROPHILS NFR BLD AUTO: 61.6 % (ref 42.7–76)
NITRITE UR QL STRIP: NEGATIVE
NRBC BLD AUTO-RTO: 0 /100 WBC (ref 0–0.2)
PH UR STRIP.AUTO: 6 [PH] (ref 5–8)
PLATELET # BLD AUTO: 327 10*3/MM3 (ref 140–450)
PMV BLD AUTO: 11.4 FL (ref 6–12)
POTASSIUM SERPL-SCNC: 4.7 MMOL/L (ref 3.5–5.2)
PROT SERPL-MCNC: 6.5 G/DL (ref 6–8.5)
PROT UR QL STRIP: ABNORMAL
RBC # BLD AUTO: 4.99 10*6/MM3 (ref 3.77–5.28)
RBC # UR STRIP: ABNORMAL /HPF
REF LAB TEST METHOD: ABNORMAL
SODIUM SERPL-SCNC: 140 MMOL/L (ref 136–145)
SP GR UR STRIP: 1.03 (ref 1–1.03)
SQUAMOUS #/AREA URNS HPF: ABNORMAL /HPF
T4 FREE SERPL-MCNC: 1.09 NG/DL (ref 0.93–1.7)
TIBC SERPL-MCNC: 413 MCG/DL (ref 298–536)
TRANSFERRIN SERPL-MCNC: 277 MG/DL (ref 200–360)
TRIGL SERPL-MCNC: 286 MG/DL (ref 0–150)
TSH SERPL DL<=0.05 MIU/L-ACNC: 2.85 UIU/ML (ref 0.27–4.2)
UROBILINOGEN UR QL STRIP: ABNORMAL
VIT B12 BLD-MCNC: 982 PG/ML (ref 211–946)
VLDLC SERPL-MCNC: 50 MG/DL (ref 5–40)
WBC # UR STRIP: ABNORMAL /HPF
WBC NRBC COR # BLD: 8.09 10*3/MM3 (ref 3.4–10.8)

## 2023-07-21 PROCEDURE — 82607 VITAMIN B-12: CPT | Performed by: FAMILY MEDICINE

## 2023-07-21 PROCEDURE — 84443 ASSAY THYROID STIM HORMONE: CPT | Performed by: FAMILY MEDICINE

## 2023-07-21 PROCEDURE — 83540 ASSAY OF IRON: CPT | Performed by: FAMILY MEDICINE

## 2023-07-21 PROCEDURE — 80053 COMPREHEN METABOLIC PANEL: CPT | Performed by: FAMILY MEDICINE

## 2023-07-21 PROCEDURE — 85652 RBC SED RATE AUTOMATED: CPT | Performed by: FAMILY MEDICINE

## 2023-07-21 PROCEDURE — 85025 COMPLETE CBC W/AUTO DIFF WBC: CPT | Performed by: FAMILY MEDICINE

## 2023-07-21 PROCEDURE — 83036 HEMOGLOBIN GLYCOSYLATED A1C: CPT | Performed by: FAMILY MEDICINE

## 2023-07-21 PROCEDURE — 84466 ASSAY OF TRANSFERRIN: CPT | Performed by: FAMILY MEDICINE

## 2023-07-21 PROCEDURE — 81001 URINALYSIS AUTO W/SCOPE: CPT | Performed by: FAMILY MEDICINE

## 2023-07-21 PROCEDURE — 86038 ANTINUCLEAR ANTIBODIES: CPT | Performed by: FAMILY MEDICINE

## 2023-07-21 PROCEDURE — 84439 ASSAY OF FREE THYROXINE: CPT | Performed by: FAMILY MEDICINE

## 2023-07-21 PROCEDURE — 82728 ASSAY OF FERRITIN: CPT | Performed by: FAMILY MEDICINE

## 2023-07-21 PROCEDURE — 80061 LIPID PANEL: CPT | Performed by: FAMILY MEDICINE

## 2023-07-21 PROCEDURE — 36415 COLL VENOUS BLD VENIPUNCTURE: CPT

## 2023-07-21 PROCEDURE — 82306 VITAMIN D 25 HYDROXY: CPT | Performed by: FAMILY MEDICINE

## 2023-07-22 NOTE — PROGRESS NOTES
Anna tell staff that I am going to increase her rosuvastatin to 10 mg daily.  She still takes just 1 a day.  Repeat cholesterol panel in 8 weeks.  Tell her the cholesterol is better but it is just not to the level we wanted at yet.  Tell her she needs some iron replacement.  Just 3 days a week does she need to take iron and that should be enough to replace her storage levels.  Her vitamin D is low.  Tell her  We will call in capsules of the 50,000 units each and I would ask that she take one a week for the next 12 weeks.   After those 12 weeks are completed then  buy otc Vit D at 2000 units each  and take one daily.   Lets recheck the Vit D level in about 5-6 months..

## 2023-07-24 ENCOUNTER — TELEPHONE (OUTPATIENT)
Dept: FAMILY MEDICINE CLINIC | Facility: CLINIC | Age: 68
End: 2023-07-24
Payer: MEDICARE

## 2023-07-24 LAB — ANA SER QL: NEGATIVE

## 2023-07-24 NOTE — TELEPHONE ENCOUNTER
----- Message from Andrews Ramírez MD sent at 7/22/2023  9:14 AM EDT -----  Anna tell staff that I am going to increase her rosuvastatin to 10 mg daily.  She still takes just 1 a day.  Repeat cholesterol panel in 8 weeks.  Tell her the cholesterol is better but it is just not to the level we wanted at yet.  Tell her she needs some iron replacement.  Just 3 days a week does she need to take iron and that should be enough to replace her storage levels.  Her vitamin D is low.  Tell her  We will call in capsules of the 50,000 units each and I would ask that she take one a week for the next 12 weeks.   After those 12 weeks are completed then  buy otc Vit D at 2000 units each  and take one daily.   Lets recheck the Vit D level in about 5-6 months..

## 2023-08-16 DIAGNOSIS — G89.4 CHRONIC PAIN DISORDER: ICD-10-CM

## 2023-08-16 DIAGNOSIS — M47.812 CERVICAL SPONDYLOSIS: ICD-10-CM

## 2023-08-16 DIAGNOSIS — M54.2 NECK PAIN, BILATERAL: ICD-10-CM

## 2023-08-16 RX ORDER — HYDROCODONE BITARTRATE AND ACETAMINOPHEN 10; 325 MG/1; MG/1
1 TABLET ORAL EVERY 6 HOURS PRN
Qty: 120 TABLET | Refills: 0 | Status: SHIPPED | OUTPATIENT
Start: 2023-08-16

## 2023-08-16 NOTE — TELEPHONE ENCOUNTER
"----- Message from Katarina Phillips sent at 8/16/2023  8:42 AM EDT -----  Regarding: Pain Medication Refill  Contact: [phone]  Greetings Dr. MIRAMONTES,  Well cross your fingers I am going to my first acupuncture appointment today, I am praying it will help me..  Also, could you be so kind as to refill my pain medication I am just about out.  Hope all is well with you and the family..  I have an appointment with you on the 28th, just a follow-up, my energy level has improved, \"thank you so much\"  Regards,  Katarina Phillips  [phone]  "

## 2023-08-28 ENCOUNTER — OFFICE VISIT (OUTPATIENT)
Dept: FAMILY MEDICINE CLINIC | Facility: CLINIC | Age: 68
End: 2023-08-28
Payer: MEDICARE

## 2023-08-28 VITALS
HEART RATE: 85 BPM | HEIGHT: 64 IN | BODY MASS INDEX: 27.66 KG/M2 | RESPIRATION RATE: 16 BRPM | DIASTOLIC BLOOD PRESSURE: 96 MMHG | OXYGEN SATURATION: 94 % | WEIGHT: 162 LBS | SYSTOLIC BLOOD PRESSURE: 158 MMHG

## 2023-08-28 DIAGNOSIS — G89.4 CHRONIC PAIN DISORDER: Primary | ICD-10-CM

## 2023-08-28 DIAGNOSIS — F51.01 PRIMARY INSOMNIA: ICD-10-CM

## 2023-08-28 DIAGNOSIS — M79.7 FIBROMYALGIA: ICD-10-CM

## 2023-08-28 PROBLEM — I20.0 UNSTABLE ANGINA: Status: RESOLVED | Noted: 2021-05-10 | Resolved: 2023-08-28

## 2023-08-28 PROBLEM — H66.41 SUPPURATIVE OTITIS MEDIA OF RIGHT EAR: Status: RESOLVED | Noted: 2022-10-14 | Resolved: 2023-08-28

## 2023-08-28 PROBLEM — R00.2 PALPITATIONS: Status: RESOLVED | Noted: 2021-04-13 | Resolved: 2023-08-28

## 2023-08-28 PROBLEM — M79.602 PAIN IN BOTH UPPER EXTREMITIES: Status: RESOLVED | Noted: 2022-02-08 | Resolved: 2023-08-28

## 2023-08-28 PROBLEM — M79.601 PAIN IN BOTH UPPER EXTREMITIES: Status: RESOLVED | Noted: 2022-02-08 | Resolved: 2023-08-28

## 2023-08-28 PROBLEM — K21.9 GASTROESOPHAGEAL REFLUX DISEASE: Status: RESOLVED | Noted: 2019-01-31 | Resolved: 2023-08-28

## 2023-08-28 PROBLEM — M79.644 THUMB PAIN, RIGHT: Status: RESOLVED | Noted: 2017-01-11 | Resolved: 2023-08-28

## 2023-08-28 PROBLEM — I48.92 ATRIAL FLUTTER WITH RAPID VENTRICULAR RESPONSE: Status: RESOLVED | Noted: 2021-04-13 | Resolved: 2023-08-28

## 2023-08-28 PROBLEM — J01.00 SUBACUTE MAXILLARY SINUSITIS: Status: RESOLVED | Noted: 2022-10-14 | Resolved: 2023-08-28

## 2023-08-28 PROBLEM — M25.551 RIGHT HIP PAIN: Status: RESOLVED | Noted: 2021-08-04 | Resolved: 2023-08-28

## 2023-08-28 PROCEDURE — 3080F DIAST BP >= 90 MM HG: CPT | Performed by: FAMILY MEDICINE

## 2023-08-28 PROCEDURE — 99213 OFFICE O/P EST LOW 20 MIN: CPT | Performed by: FAMILY MEDICINE

## 2023-08-28 PROCEDURE — 1159F MED LIST DOCD IN RCRD: CPT | Performed by: FAMILY MEDICINE

## 2023-08-28 PROCEDURE — 1160F RVW MEDS BY RX/DR IN RCRD: CPT | Performed by: FAMILY MEDICINE

## 2023-08-28 PROCEDURE — 3077F SYST BP >= 140 MM HG: CPT | Performed by: FAMILY MEDICINE

## 2023-08-28 RX ORDER — AMITRIPTYLINE HYDROCHLORIDE 10 MG/1
10 TABLET, FILM COATED ORAL NIGHTLY
Qty: 90 TABLET | Refills: 2 | Status: SHIPPED | OUTPATIENT
Start: 2023-08-28 | End: 2023-11-26

## 2023-08-28 NOTE — PROGRESS NOTES
"Chief Complaint  Follow-up    Subjective        Katarina Phillips presents to [facility]  History of Present Illness  Pt here for a follow up. She says she is still struggling with fatigue and pain    The patient presents to the clinic for a follow-up.    The patient is not doing well physically. She experiences pain constantly and denies sleeping well at night. She is unable to move like she used to. She localizes her pain to her neck and lower back and is unable to lay down in her bed or sit back in her chair due to the pain. She has a limited range of motion and has a lump in her neck that remains. She has been to pain management in the past. She reports that the injection she received was excruciating. She is heavily involved in the [employer]. She notes that she has a meeting in 09/2023 with a disability person to see if there is something that they could find for her to do so she can generate a little income. She was told by Dr. Rick that she would suffer terribly when she becomes older with fibromyalgia. 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 She experienced a foot drop after surgery. She states a disc fragment formed around a nerve and while she was out gardening, she went to stand up and was completely paralyzed. She has had 3 disks in her neck removed with a total of 9 discectomies. She performed 3 years of physical therapy. She has tried acupuncture in the past that was unsuccessful.     The patient complains of sleep disturbance. She has taken melatonin in the past and was groggy the next day. She has received a B12 product that did well for her. She currently uses B12. Her blood pressure is elevated due to pain that is constant.     Objective   Vital Signs:  /96 (BP Location: Left arm, Patient Position: Sitting, Cuff Size: Adult)   Pulse 85   Resp 16   Ht 162.6 cm (64\")   Wt 73.5 kg (162 lb)   SpO2 " "94%   BMI 27.81 kg/mý   Estimated body mass index is 27.81 kg/mý as calculated from the following:    Height as of this encounter: 162.6 cm (64\").    Weight as of this encounter: 73.5 kg (162 lb).             Physical Exam  Constitutional:       Appearance: Normal appearance. She is well-developed and normal weight.   HENT:      Head: Normocephalic and atraumatic.      Right Ear: Tympanic membrane, ear canal and external ear normal.      Left Ear: Tympanic membrane, ear canal and external ear normal.      Nose: Nose normal.      Mouth/Throat:      Mouth: Mucous membranes are moist.      Pharynx: Oropharynx is clear. No oropharyngeal exudate.   Eyes:      Extraocular Movements: Extraocular movements intact.      Conjunctiva/sclera: Conjunctivae normal.      Pupils: Pupils are equal, round, and reactive to light.   Cardiovascular:      Rate and Rhythm: Normal rate and regular rhythm.      Pulses: Normal pulses.      Heart sounds: Normal heart sounds.   Pulmonary:      Effort: Pulmonary effort is normal.      Breath sounds: Normal breath sounds.   Abdominal:      General: Bowel sounds are normal.      Palpations: Abdomen is soft.   Musculoskeletal:         General: Normal range of motion.      Cervical back: Normal range of motion and neck supple.   Skin:     General: Skin is warm and dry.   Neurological:      General: No focal deficit present.      Mental Status: She is alert and oriented to person, place, and time. Mental status is at baseline.      Comments: Left foot drop.    Psychiatric:         Mood and Affect: Mood normal.         Behavior: Behavior normal.         Thought Content: Thought content normal.         Judgment: Judgment normal.      Result Review :                   Assessment and Plan       1. Chronic pain disorder  Katarina is a 68-year-old white female who has had chronic pain for many, many years. Most of her pain is coming from her spinal column. Her neck and lumbosacral area particularly " bother her. She has had surgery on her lumbar spine and has had disc removal and some spinal fusions. Most of her pain sounds very much like fibromyalgia though and disturbed sleep is causing a significant amount of it. She takes hydrocodone 10 mg, averaging about 4 a day. The medication allows her to get out of bed and helps her do the work that she needs to do during the week. She does not sleep well, so we are going to have to work on trying to improve her sleep quality. We are going to start with some amitriptyline at bedtime and think about trying to set her up for craniosacral therapy. We will see how she does with the amitriptyline and go from there.    2. Primary insomnia  The patient has a great deal of difficulty sleeping. We are going to add in the amitriptyline at bedtime. I may have to add in something more to help her sleep if she does not start resting better. Sleep will be important for her as far as the fibromyalgia treatment is concerned. She needs to keep exercising so that she is tired at the end of the day, and she needs to have a sleep routine that involves winding down as the evening comes on. We will see how she does with amitriptyline at night and see if that helps. We will go from there depending on how she is doing.         Follow Up   Return in about 4 months (around 12/28/2023), or if symptoms worsen or fail to improve, for Chronic Pain Management.  Patient was given instructions and counseling regarding her condition or for health maintenance advice. Please see specific information pulled into the AVS if appropriate.       Answers submitted by the patient for this visit:  Other (Submitted on 6/21/2023)  Please describe your symptoms.: Medication appointment, every 4 months.  Have you had these symptoms before?: Yes  How long have you been having these symptoms?: Greater than 2 weeks  Please list any medications you are currently taking for this condition.: pain meds.  Please describe  any probable cause for these symptoms. : long history of pain...  Primary Reason for Visit (Submitted on 6/21/2023)  What is the primary reason for your visit?: Other    Transcribed from ambient dictation for Andrews Ramírez MD by Shayy Pike.  08/28/23   09:51 EDT    Patient or patient representative verbalized consent to the visit recording.  I have personally performed the services described in this document as transcribed by the above individual, and it is both accurate and complete.  Andrews Ramírez MD  9/3/2023  17:56 EDT

## 2023-09-15 DIAGNOSIS — M47.812 CERVICAL SPONDYLOSIS: ICD-10-CM

## 2023-09-15 DIAGNOSIS — G89.4 CHRONIC PAIN DISORDER: ICD-10-CM

## 2023-09-15 DIAGNOSIS — M54.2 NECK PAIN, BILATERAL: ICD-10-CM

## 2023-09-15 RX ORDER — HYDROCODONE BITARTRATE AND ACETAMINOPHEN 10; 325 MG/1; MG/1
1 TABLET ORAL EVERY 6 HOURS PRN
Qty: 120 TABLET | Refills: 0 | Status: SHIPPED | OUTPATIENT
Start: 2023-09-15

## 2023-10-16 DIAGNOSIS — M47.812 CERVICAL SPONDYLOSIS: ICD-10-CM

## 2023-10-16 DIAGNOSIS — M54.2 NECK PAIN, BILATERAL: ICD-10-CM

## 2023-10-16 DIAGNOSIS — G89.4 CHRONIC PAIN DISORDER: ICD-10-CM

## 2023-10-16 NOTE — TELEPHONE ENCOUNTER
----- Message from Katarina Phillips sent at 10/16/2023 11:23 AM EDT -----  Regarding: Pain Medication Refill  Contact: [phone]  Greeting Dr. REED,    If you would be so kind as to refill my pain medication it would be truly appreciated and much needed.  Best to you and the family,  Katarina Phillips  [phone]

## 2023-10-17 RX ORDER — HYDROCODONE BITARTRATE AND ACETAMINOPHEN 10; 325 MG/1; MG/1
1 TABLET ORAL EVERY 6 HOURS PRN
Qty: 120 TABLET | Refills: 0 | Status: SHIPPED | OUTPATIENT
Start: 2023-10-17

## 2023-10-23 ENCOUNTER — TELEPHONE (OUTPATIENT)
Dept: FAMILY MEDICINE CLINIC | Facility: CLINIC | Age: 68
End: 2023-10-23
Payer: MEDICARE

## 2023-10-23 NOTE — TELEPHONE ENCOUNTER
Last A1C was 6.0, I'm not sure that her insurance will cover this medication. May have to do the cash pay at [place].

## 2023-10-23 NOTE — TELEPHONE ENCOUNTER
"----- Message from Katarina Phillips sent at 10/23/2023 11:07 AM EDT -----  Regarding: Weight Loss  Contact: [phone]  Greetings Dr. REED  A friend of mine is using \"Ozempic\" and looks amazing.  Would it be possible for me to get a prescription of this medication, so I can get lose at least 25 pounds?  I am down to salad and a smoothie a day and still can't lose but a pound or two.  I have been trying for months, if you could help me with this it would be greatly appreciated..    Best Regards,  Katarina Phillips  [phone]  "

## 2023-10-25 NOTE — TELEPHONE ENCOUNTER
Sammi tell Katarina that I sent in the prescription for her.  Explained to her that insurance may or may not cover it.  If they do not cover it we can still try [place] but it still pricey.

## 2023-11-15 DIAGNOSIS — M54.2 NECK PAIN, BILATERAL: ICD-10-CM

## 2023-11-15 DIAGNOSIS — G89.4 CHRONIC PAIN DISORDER: ICD-10-CM

## 2023-11-15 DIAGNOSIS — M47.812 CERVICAL SPONDYLOSIS: ICD-10-CM

## 2023-11-15 RX ORDER — HYDROCODONE BITARTRATE AND ACETAMINOPHEN 10; 325 MG/1; MG/1
1 TABLET ORAL EVERY 6 HOURS PRN
Qty: 120 TABLET | Refills: 0 | Status: SHIPPED | OUTPATIENT
Start: 2023-11-15

## 2023-11-28 DIAGNOSIS — R00.2 PALPITATIONS: ICD-10-CM

## 2023-11-28 DIAGNOSIS — E78.2 MIXED HYPERLIPIDEMIA: ICD-10-CM

## 2023-11-28 DIAGNOSIS — I48.0 PAROXYSMAL ATRIAL FIBRILLATION: ICD-10-CM

## 2023-11-28 DIAGNOSIS — I10 ESSENTIAL HYPERTENSION: ICD-10-CM

## 2023-11-28 RX ORDER — LOSARTAN POTASSIUM 100 MG/1
100 TABLET ORAL DAILY
Qty: 90 TABLET | Refills: 0 | Status: SHIPPED | OUTPATIENT
Start: 2023-11-28

## 2023-12-06 RX ORDER — AMLODIPINE BESYLATE 10 MG/1
TABLET ORAL
Qty: 90 TABLET | Refills: 0 | Status: SHIPPED | OUTPATIENT
Start: 2023-12-06

## 2023-12-06 RX ORDER — ROSUVASTATIN CALCIUM 5 MG/1
TABLET, COATED ORAL
Qty: 90 TABLET | Refills: 0 | OUTPATIENT
Start: 2023-12-06

## 2023-12-15 DIAGNOSIS — M47.812 CERVICAL SPONDYLOSIS: ICD-10-CM

## 2023-12-15 DIAGNOSIS — G89.4 CHRONIC PAIN DISORDER: ICD-10-CM

## 2023-12-15 DIAGNOSIS — M54.2 NECK PAIN, BILATERAL: ICD-10-CM

## 2023-12-15 RX ORDER — HYDROCODONE BITARTRATE AND ACETAMINOPHEN 10; 325 MG/1; MG/1
1 TABLET ORAL EVERY 6 HOURS PRN
Qty: 120 TABLET | Refills: 0 | Status: SHIPPED | OUTPATIENT
Start: 2023-12-15

## 2024-01-17 ENCOUNTER — PATIENT MESSAGE (OUTPATIENT)
Dept: FAMILY MEDICINE CLINIC | Facility: CLINIC | Age: 69
End: 2024-01-17
Payer: MEDICARE

## 2024-01-17 DIAGNOSIS — M47.812 CERVICAL SPONDYLOSIS: ICD-10-CM

## 2024-01-17 DIAGNOSIS — G89.4 CHRONIC PAIN DISORDER: ICD-10-CM

## 2024-01-17 DIAGNOSIS — M54.2 NECK PAIN, BILATERAL: ICD-10-CM

## 2024-01-17 NOTE — TELEPHONE ENCOUNTER
From: Katarina Phillips  To: Andrews Ramírez  Sent: 1/17/2024 10:53 AM EST  Subject: Pain Medication Refill    Greetings Sammi Fletcher,    Would you be so kind as to refill my pain medication, and I would like you to know I have lost 15 pounds, I am so very grateful for the Ozempic medication I could also use a refill on the Ozempic as well.    Best Regards,  Katarina Phillips

## 2024-01-17 NOTE — TELEPHONE ENCOUNTER
Medications are pended for you. On her ozempic, she has been on 0.25 x 4 wks and 0.50 x 2 weeks, she is ready to move up to the next dose.

## 2024-01-18 RX ORDER — HYDROCODONE BITARTRATE AND ACETAMINOPHEN 10; 325 MG/1; MG/1
1 TABLET ORAL EVERY 6 HOURS PRN
Qty: 120 TABLET | Refills: 0 | Status: SHIPPED | OUTPATIENT
Start: 2024-01-18

## 2024-02-07 RX ORDER — FLUOXETINE HYDROCHLORIDE 20 MG/1
60 CAPSULE ORAL DAILY
Qty: 270 CAPSULE | Refills: 0 | Status: SHIPPED | OUTPATIENT
Start: 2024-02-07

## 2024-02-07 NOTE — PROGRESS NOTES
Date of Office Visit: 2024  Encounter Provider: Dr. Olman Berg  Place of Service: [facility]ARDI[facility] [place]  Patient Name: Katarina Phillips  :1955  Andrews Ramírez MD    Chief Complaint   Patient presents with    Hypertension    Hyperlipidemia    Follow-up    Atrial Fibrillation     History of Present Illness    I am pleased to see Mrs. Phillips in my office today as a follow-up.    As you know, patient is 68-year-old white female whose past medical history is significant for atrial flutter, who came today for follow-up after recent hospital admission and discharge.    In May 2021, patient was admitted at [facility] with symptom of palpitation and gastroenteritis.  Patient was found to be in atrial flutter with rapid ventricular response.  Patient underwent ablation.  But patient had recurrence and had to had repeat atrial flutter ablative therapy.  Patient also underwent cardiac catheterization because of ongoing chest pain which showed nonobstructive CAD.  Echocardiogram showed normal left ventricle size and function with EF of 55%.    Patient came today and overall doing well.  However her blood pressure is elevated.  Patient has not had recurrence of atrial fibrillation.  Patient denies any symptom of chest pain or tightness or heaviness.  No orthopnea PND no syncope or presyncope.  No leg edema noted.    EKG showed sinus rhythm.    I would recommend that patient should discontinue Eliquis.  I will start aspirin 81 mg daily.  Patient is advised to discontinue amlodipine and Cardizem CD1 20 mg daily will be started.  Patient is advised to monitor the blood pressure and bring the logbook in 2 months.  I will see the patient in 1 year..      Past Medical History:   Diagnosis Date    Abnormal ECG 2021    Anxiety May 2021    Atrial fibrillation     Atrial flutter     Cataract     COVID-19 2021    Hyperlipidemia     Hypertension     Hyperthyroidism     Low  back pain     Neck pain     Tremor [id number]    Visual impairment 1990         Past Surgical History:   Procedure Laterality Date    ABLATION OF DYSRHYTHMIC FOCUS  4-13-21- / 5-11-21    BACK SURGERY      CARDIAC CATHETERIZATION N/A 05/11/2021    Procedure: Left Heart Cath;  Surgeon: Olman Berg MD;  Location:  [facility];  Service: Cardiovascular;  Laterality: N/A;    CARDIAC ELECTROPHYSIOLOGY PROCEDURE N/A 04/14/2021    Procedure: EP/Ablation;  Surgeon: Ori Tran MD;  Location:  [facility];  Service: Cardiovascular;  Laterality: N/A;    CARDIAC ELECTROPHYSIOLOGY PROCEDURE N/A 05/11/2021    Procedure: EP/Ablation;  Surgeon: Ori Tran MD;  Location:  [facility];  Service: Cardiovascular;  Laterality: N/A;    CATARACT EXTRACTION      COLONOSCOPY  2014    NECK SURGERY      ORTHOPEDIC SURGERY      mx foot breaks with surgical repair/wrist surgery    SPINE SURGERY  1988, 1989, 1990, 2006.    TUBAL ABDOMINAL LIGATION  1989           Current Outpatient Medications:     buPROPion SR (Wellbutrin SR) 150 MG 12 hr tablet, Take 1 tablet by mouth 2 (Two) Times a Day for 30 days., Disp: 60 tablet, Rfl: 6    ferrous gluconate (FERGON) 324 MG tablet, Take one tablet after a meal on Monday , Wednesday and fridays.   Recheck cbc and iron levels in 2-3 mos., Disp: 50 tablet, Rfl: 5    FLUoxetine (PROzac) 20 MG capsule, TAKE 3 CAPSULES BY MOUTH ONCE DAILY, Disp: 270 capsule, Rfl: 0    hydroCHLOROthiazide (HYDRODIURIL) 12.5 MG tablet, Take 1 tablet by mouth Daily., Disp: , Rfl:     HYDROcodone-acetaminophen (NORCO)  MG per tablet, Take 1 tablet by mouth Every 6 (Six) Hours As Needed for Moderate Pain., Disp: 120 tablet, Rfl: 0    losartan (COZAAR) 100 MG tablet, Take 1 tablet by mouth once daily, Disp: 90 tablet, Rfl: 0    nitroglycerin (NITROSTAT) 0.4 MG SL tablet, Place 1 tablet under the tongue Every 5 (Five) Minutes As Needed. do not exceed a total of  3 doses in 15 minutes, Disp: , Rfl:     rosuvastatin (CRESTOR) 20 MG tablet, Take 1 tablet by mouth Daily for 180 days., Disp: 90 tablet, Rfl: 1    Semaglutide-Weight Management 1 MG/0.5ML solution auto-injector, Inject 0.5 mL under the skin into the appropriate area as directed 1 (One) Time Per Week for 4 doses., Disp: 2 mL, Rfl: 0    aspirin 81 MG EC tablet, Take 1 tablet by mouth Daily., Disp: 100 tablet, Rfl: 1    dilTIAZem CD (Cardizem CD) 120 MG 24 hr capsule, Take 1 capsule by mouth Daily., Disp: 90 capsule, Rfl: 1      Social History     Socioeconomic History    Marital status:    Tobacco Use    Smoking status: Former     Packs/day: 1.00     Years: 12.00     Additional pack years: 0.00     Total pack years: 12.00     Types: Cigarettes     Start date: 1980     Quit date: 1992     Years since quittin.8    Smokeless tobacco: Never    Tobacco comments:     Have not smoked in over 30 years +   Vaping Use    Vaping Use: Never used   Substance and Sexual Activity    Alcohol use: Never    Drug use: Not Currently     Frequency: 1.0 times per week     Types: Marijuana     Comment: Tried eatable once/to replace Opioid  use of 30 years    Sexual activity: Not Currently     Partners: Male     Birth control/protection: None     Comment: Don't need birth control         Review of Systems   Constitutional: Negative for chills and fever.   HENT:  Negative for ear discharge and nosebleeds.    Eyes:  Negative for discharge and redness.   Cardiovascular:  Negative for chest pain, orthopnea, palpitations, paroxysmal nocturnal dyspnea and syncope.   Respiratory:  Positive for shortness of breath. Negative for cough and wheezing.    Endocrine: Negative for heat intolerance.   Skin:  Negative for rash.   Musculoskeletal:  Negative for arthritis and myalgias.   Gastrointestinal:  Negative for abdominal pain, melena, nausea and vomiting.   Genitourinary:  Negative for dysuria and hematuria.   Neurological:   "Negative for dizziness, light-headedness, numbness and tremors.   Psychiatric/Behavioral:  Negative for depression. The patient is not nervous/anxious.        Procedures      ECG 12 Lead    Date/Time: 2/8/2024 5:23 PM  Performed by: Olman Berg MD    Authorized by: Olman Berg MD  Comparison: compared with previous ECG   Similar to previous ECG  Rhythm: sinus rhythm  Q waves: II, III and aVF      Clinical impression: abnormal EKG          ECG 12 Lead    (Results Pending)           Objective:    /80   Pulse 82   Resp 16   Ht 162.6 cm (64.02\")   Wt 68.9 kg (152 lb)   SpO2 93%   BMI 26.08 kg/m²         Constitutional:       Appearance: Well-developed.   Eyes:      General: No scleral icterus.        Right eye: No discharge.   HENT:      Head: Normocephalic and atraumatic.   Neck:      Thyroid: No thyromegaly.      Lymphadenopathy: No cervical adenopathy.   Pulmonary:      Effort: Pulmonary effort is normal. No respiratory distress.      Breath sounds: Normal breath sounds. No wheezing. No rales.   Cardiovascular:      Normal rate. Regular rhythm.      No gallop.    Edema:     Peripheral edema absent.   Abdominal:      Tenderness: There is no abdominal tenderness.   Skin:     Findings: No erythema or rash.   Neurological:      Mental Status: Alert and oriented to person, place, and time.             Assessment:       Diagnosis Plan   1. Mixed hyperlipidemia  ECG 12 Lead    rosuvastatin (CRESTOR) 20 MG tablet    dilTIAZem CD (Cardizem CD) 120 MG 24 hr capsule      2. Essential hypertension  ECG 12 Lead    rosuvastatin (CRESTOR) 20 MG tablet    dilTIAZem CD (Cardizem CD) 120 MG 24 hr capsule      3. Paroxysmal atrial fibrillation  ECG 12 Lead    rosuvastatin (CRESTOR) 20 MG tablet    dilTIAZem CD (Cardizem CD) 120 MG 24 hr capsule               Plan:       MDM:    1.  Paroxysmal atrial fibrillation:    Patient has not had any recurrence of atrial fibrillation.  I would recommend to discontinue Eliquis.  " Aspirin should be started 81 mg daily    2.  Hypertension:    Blood pressure is high discontinue amlodipine start Cardizem  mg daily    3.  Hyperlipidemia:    Patient is on Crestor.  Recent LDL is 119.  I would recommend to increase Crestor to 20 mg daily.

## 2024-02-08 ENCOUNTER — OFFICE VISIT (OUTPATIENT)
Dept: CARDIOLOGY | Facility: CLINIC | Age: 69
End: 2024-02-08
Payer: MEDICARE

## 2024-02-08 VITALS
BODY MASS INDEX: 25.95 KG/M2 | HEIGHT: 64 IN | OXYGEN SATURATION: 93 % | DIASTOLIC BLOOD PRESSURE: 80 MMHG | WEIGHT: 152 LBS | HEART RATE: 82 BPM | RESPIRATION RATE: 16 BRPM | SYSTOLIC BLOOD PRESSURE: 139 MMHG

## 2024-02-08 DIAGNOSIS — I10 ESSENTIAL HYPERTENSION: ICD-10-CM

## 2024-02-08 DIAGNOSIS — I48.0 PAROXYSMAL ATRIAL FIBRILLATION: ICD-10-CM

## 2024-02-08 DIAGNOSIS — E78.2 MIXED HYPERLIPIDEMIA: Primary | ICD-10-CM

## 2024-02-08 PROCEDURE — 3079F DIAST BP 80-89 MM HG: CPT | Performed by: INTERNAL MEDICINE

## 2024-02-08 PROCEDURE — 1159F MED LIST DOCD IN RCRD: CPT | Performed by: INTERNAL MEDICINE

## 2024-02-08 PROCEDURE — 1160F RVW MEDS BY RX/DR IN RCRD: CPT | Performed by: INTERNAL MEDICINE

## 2024-02-08 PROCEDURE — 3075F SYST BP GE 130 - 139MM HG: CPT | Performed by: INTERNAL MEDICINE

## 2024-02-08 PROCEDURE — 93000 ELECTROCARDIOGRAM COMPLETE: CPT | Performed by: INTERNAL MEDICINE

## 2024-02-08 PROCEDURE — 99214 OFFICE O/P EST MOD 30 MIN: CPT | Performed by: INTERNAL MEDICINE

## 2024-02-08 RX ORDER — DILTIAZEM HYDROCHLORIDE 120 MG/1
120 CAPSULE, COATED, EXTENDED RELEASE ORAL DAILY
Qty: 90 CAPSULE | Refills: 1 | Status: SHIPPED | OUTPATIENT
Start: 2024-02-08

## 2024-02-08 RX ORDER — ROSUVASTATIN CALCIUM 20 MG/1
20 TABLET, COATED ORAL DAILY
Qty: 90 TABLET | Refills: 1 | Status: SHIPPED | OUTPATIENT
Start: 2024-02-08 | End: 2024-08-06

## 2024-02-08 RX ORDER — ASPIRIN 81 MG/1
81 TABLET ORAL DAILY
Qty: 100 TABLET | Refills: 1 | Status: SHIPPED | OUTPATIENT
Start: 2024-02-08

## 2024-02-21 ENCOUNTER — PATIENT MESSAGE (OUTPATIENT)
Dept: FAMILY MEDICINE CLINIC | Facility: CLINIC | Age: 69
End: 2024-02-21
Payer: MEDICARE

## 2024-02-21 DIAGNOSIS — M54.2 NECK PAIN, BILATERAL: ICD-10-CM

## 2024-02-21 DIAGNOSIS — M47.812 CERVICAL SPONDYLOSIS: ICD-10-CM

## 2024-02-21 DIAGNOSIS — G89.4 CHRONIC PAIN DISORDER: ICD-10-CM

## 2024-02-21 RX ORDER — HYDROCODONE BITARTRATE AND ACETAMINOPHEN 10; 325 MG/1; MG/1
1 TABLET ORAL EVERY 6 HOURS PRN
Qty: 120 TABLET | Refills: 0 | Status: SHIPPED | OUTPATIENT
Start: 2024-02-21

## 2024-03-25 ENCOUNTER — OFFICE VISIT (OUTPATIENT)
Dept: FAMILY MEDICINE CLINIC | Facility: CLINIC | Age: 69
End: 2024-03-25
Payer: MEDICARE

## 2024-03-25 VITALS
OXYGEN SATURATION: 95 % | BODY MASS INDEX: 25.78 KG/M2 | HEART RATE: 76 BPM | DIASTOLIC BLOOD PRESSURE: 82 MMHG | RESPIRATION RATE: 16 BRPM | SYSTOLIC BLOOD PRESSURE: 148 MMHG | WEIGHT: 151 LBS | HEIGHT: 64 IN

## 2024-03-25 DIAGNOSIS — G89.4 CHRONIC PAIN DISORDER: ICD-10-CM

## 2024-03-25 DIAGNOSIS — M47.812 CERVICAL SPONDYLOSIS: Primary | ICD-10-CM

## 2024-03-25 DIAGNOSIS — M54.2 NECK PAIN, BILATERAL: ICD-10-CM

## 2024-03-25 DIAGNOSIS — Z98.890 S/P CERVICAL DISCECTOMY: ICD-10-CM

## 2024-03-25 DIAGNOSIS — M54.50 LUMBAR SPINE PAIN: ICD-10-CM

## 2024-03-25 DIAGNOSIS — Z98.1 S/P LUMBAR FUSION: ICD-10-CM

## 2024-03-25 RX ORDER — SEMAGLUTIDE 1.34 MG/ML
1 INJECTION, SOLUTION SUBCUTANEOUS WEEKLY
COMMUNITY
Start: 2024-03-23

## 2024-03-25 RX ORDER — HYDROCODONE BITARTRATE AND ACETAMINOPHEN 10; 325 MG/1; MG/1
1 TABLET ORAL EVERY 6 HOURS PRN
Qty: 120 TABLET | Refills: 0 | Status: SHIPPED | OUTPATIENT
Start: 2024-03-25

## 2024-03-25 RX ORDER — AMITRIPTYLINE HYDROCHLORIDE 10 MG/1
10 TABLET, FILM COATED ORAL NIGHTLY
COMMUNITY
Start: 2024-02-22

## 2024-03-25 NOTE — PROGRESS NOTES
"Chief Complaint  Pain and Weight Loss    Subjective        Katarina Phillips presents to [facility]  History of Present Illness  The patient is a 68-year-old female who presents for evaluation of multiple medical concerns.    She is here for a routine pain management visit. She takes her pain medication for her neck, shoulders, and upper back. The pain does radiate into her arms and into her lower back pain when she does something. The pain is always in her hands. She loses  strength. She has terrible tremors that she has had for years. Her left arm is extremely weak. She can not turn her neck. Her left trapezius is the worst. The pain radiates down her arm and into her thumb. She normally takes 1 medication when she gets up in the morning because when she gets out of bed, she can not move or bend. When she does not have any pain medication, she can not use either of her arms. She gets a shocking pain in her arms. Her arms hurt all the time, but it is mostly on the left side. It is difficult to drive. She has learned to compensate because she can not move her neck around. She has had x-rays of her neck. She has been offered surgery. She has had 4 surgeries. The last 3 were in her neck. She still has some herniated discs. She does not  anything or do anything that would aggravate it.    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 By the time they found it, she had lost the use of her whole left side of her body. 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 When they removed it, she was told she would not use it from the knee down. If she does not have shoes, her foot hangs. She gets a lot of lower back pain because she has to lift her leg a little higher. She can not walk very far.       Objective   Vital Signs:  /82   Pulse 76   Resp 16   Ht 162.6 cm (64\")   Wt 68.5 kg (151 lb)   SpO2 95%   BMI 25.92 " "kg/m²   Estimated body mass index is 25.92 kg/m² as calculated from the following:    Height as of this encounter: 162.6 cm (64\").    Weight as of this encounter: 68.5 kg (151 lb).       BMI is >= 25 and <30. (Overweight) The following options were offered after discussion;: exercise counseling/recommendations      Physical Exam  Constitutional:       Appearance: Normal appearance. She is well-developed and normal weight.   HENT:      Head: Normocephalic and atraumatic.      Right Ear: Tympanic membrane, ear canal and external ear normal.      Left Ear: Tympanic membrane, ear canal and external ear normal.      Nose: Nose normal.      Mouth/Throat:      Mouth: Mucous membranes are moist.      Pharynx: Oropharynx is clear. No oropharyngeal exudate.   Eyes:      Extraocular Movements: Extraocular movements intact.      Conjunctiva/sclera: Conjunctivae normal.      Pupils: Pupils are equal, round, and reactive to light.   Neck:      Comments: Severe pain left trap  Cardiovascular:      Rate and Rhythm: Normal rate and regular rhythm.      Pulses: Normal pulses.      Heart sounds: Normal heart sounds.   Pulmonary:      Effort: Pulmonary effort is normal.      Breath sounds: Normal breath sounds.   Abdominal:      General: Bowel sounds are normal.      Palpations: Abdomen is soft.   Musculoskeletal:         General: Normal range of motion.      Cervical back: Normal range of motion and neck supple. Rigidity and tenderness present.   Skin:     General: Skin is warm and dry.   Neurological:      General: No focal deficit present.      Mental Status: She is alert and oriented to person, place, and time. Mental status is at baseline.      Motor: Weakness present.      Comments: Weakness in both upper arms left worse then right.   Brisk reflexes both arms and legs.   Psychiatric:         Mood and Affect: Mood normal.         Behavior: Behavior normal.         Thought Content: Thought content normal.         Judgment: Judgment " normal.        Result Review :          Results                Assessment and Plan       Assessment & Plan  1. Cervical spondylosis.  The patient has had neck pain for many, many years. She has chronic pain now in her neck, especially into the left trapezius and down the left arm. Previous years, she has had pain down her right arm also. She has had 3 surgeries on her neck. She is uncertain about whether or not they have actually fused the neck anywhere. She has very limited range of motion, but daily pain. For that reason, she takes hydrocodone, which allows her to get up and get through her day. She is a very busy 69-year-old raising 1 grandchild on her own and assisting to raise another grandchild. She also has a  who is becoming increasingly debilitated from a chronic pulmonary problem. In any case, she is doing the best she can and we are going to continue her pain medicine since it seems to be the best alternative for her.    2. Chronic pain disorder.  Mainly in the neck. She also has some lumbar problems, but they have been surgically stabilized and she has less pain there than she used to.    3. Status post cervical discectomy.  She has had 3-level discectomy. She is uncertain if she has had fusion.    4. Lumbar spine pain.  This is stable and does not bother her near as much as it used to. If her neck continues to bother her, she may need to consider fusion there if it has not already been done.    5. Status post lumbar fusion.  This is probably the reason why her lower back is much better.    6. Neck pain, bilateral.  This is as we mentioned above due to degenerative disc disease and spondylosis. Particularly right now, it is going down the left side. She has had 3 surgeries on her neck. She takes hydrocodone.    Follow-up  The patient will follow up in 07/2024 for her Medicare wellness visit.            Follow Up     Return in about 4 months (around 7/25/2024), or if symptoms worsen or fail to  improve, for Chronic Pain Management-- q 4 mos     , Medicare Wellness- make next visit the [facility] visit.  Patient was given instructions and counseling regarding her condition or for health maintenance advice. Please see specific information pulled into the AVS if appropriate.     Patient or patient representative verbalized consent for the use of Ambient Listening during the visit with  Andrews Ramírez MD for chart documentation. 3/25/2024  09:13 EDT  Answers submitted by the patient for this visit:  Other (Submitted on 3/18/2024)  Please describe your symptoms.: Routine visit, constipation.  Have you had these symptoms before?: No  How long have you been having these symptoms?: Greater than 2 weeks  Please list any medications you are currently taking for this condition.: EX-LAX.  Please describe any probable cause for these symptoms. : Perhaps all the medication I am taking.  Primary Reason for Visit (Submitted on 3/18/2024)  What is the primary reason for your visit?: Other

## 2024-04-24 DIAGNOSIS — M54.2 NECK PAIN, BILATERAL: ICD-10-CM

## 2024-04-24 DIAGNOSIS — M47.812 CERVICAL SPONDYLOSIS: ICD-10-CM

## 2024-04-24 DIAGNOSIS — G89.4 CHRONIC PAIN DISORDER: ICD-10-CM

## 2024-04-24 RX ORDER — HYDROCODONE BITARTRATE AND ACETAMINOPHEN 10; 325 MG/1; MG/1
1 TABLET ORAL EVERY 6 HOURS PRN
Qty: 120 TABLET | Refills: 0 | Status: SHIPPED | OUTPATIENT
Start: 2024-04-24

## 2024-05-16 RX ORDER — BUPROPION HYDROCHLORIDE 150 MG/1
150 TABLET, EXTENDED RELEASE ORAL 2 TIMES DAILY
Qty: 60 TABLET | Refills: 0 | Status: SHIPPED | OUTPATIENT
Start: 2024-05-16

## 2024-05-29 DIAGNOSIS — M47.812 CERVICAL SPONDYLOSIS: ICD-10-CM

## 2024-05-29 DIAGNOSIS — G89.4 CHRONIC PAIN DISORDER: ICD-10-CM

## 2024-05-29 DIAGNOSIS — M54.2 NECK PAIN, BILATERAL: ICD-10-CM

## 2024-05-29 RX ORDER — HYDROCODONE BITARTRATE AND ACETAMINOPHEN 10; 325 MG/1; MG/1
1 TABLET ORAL EVERY 6 HOURS PRN
Qty: 120 TABLET | Refills: 0 | Status: SHIPPED | OUTPATIENT
Start: 2024-05-29

## 2024-05-29 RX ORDER — SEMAGLUTIDE 1.34 MG/ML
1 INJECTION, SOLUTION SUBCUTANEOUS WEEKLY
Qty: 3 ML | Refills: 3 | Status: SHIPPED | OUTPATIENT
Start: 2024-05-29

## 2024-05-30 DIAGNOSIS — I10 ESSENTIAL HYPERTENSION: ICD-10-CM

## 2024-05-30 DIAGNOSIS — E78.2 MIXED HYPERLIPIDEMIA: ICD-10-CM

## 2024-05-30 DIAGNOSIS — I48.0 PAROXYSMAL ATRIAL FIBRILLATION: ICD-10-CM

## 2024-05-30 RX ORDER — ROSUVASTATIN CALCIUM 20 MG/1
20 TABLET, COATED ORAL DAILY
Qty: 90 TABLET | Refills: 0 | Status: SHIPPED | OUTPATIENT
Start: 2024-05-30

## 2024-06-29 DIAGNOSIS — G89.4 CHRONIC PAIN DISORDER: ICD-10-CM

## 2024-06-29 DIAGNOSIS — M47.812 CERVICAL SPONDYLOSIS: ICD-10-CM

## 2024-06-29 DIAGNOSIS — M54.2 NECK PAIN, BILATERAL: ICD-10-CM

## 2024-06-29 RX ORDER — HYDROCODONE BITARTRATE AND ACETAMINOPHEN 10; 325 MG/1; MG/1
1 TABLET ORAL EVERY 6 HOURS PRN
Qty: 120 TABLET | Refills: 0 | Status: SHIPPED | OUTPATIENT
Start: 2024-06-29

## 2024-07-27 DIAGNOSIS — G89.4 CHRONIC PAIN DISORDER: ICD-10-CM

## 2024-07-27 DIAGNOSIS — M47.812 CERVICAL SPONDYLOSIS: ICD-10-CM

## 2024-07-27 DIAGNOSIS — M54.2 NECK PAIN, BILATERAL: ICD-10-CM

## 2024-07-27 RX ORDER — HYDROCODONE BITARTRATE AND ACETAMINOPHEN 10; 325 MG/1; MG/1
1 TABLET ORAL EVERY 6 HOURS PRN
Qty: 120 TABLET | Refills: 0 | Status: SHIPPED | OUTPATIENT
Start: 2024-07-27

## 2024-08-12 ENCOUNTER — OFFICE VISIT (OUTPATIENT)
Dept: FAMILY MEDICINE CLINIC | Facility: CLINIC | Age: 69
End: 2024-08-12
Payer: MEDICARE

## 2024-08-12 ENCOUNTER — LAB (OUTPATIENT)
Dept: FAMILY MEDICINE CLINIC | Facility: CLINIC | Age: 69
End: 2024-08-12
Payer: MEDICARE

## 2024-08-12 VITALS
SYSTOLIC BLOOD PRESSURE: 134 MMHG | BODY MASS INDEX: 24.59 KG/M2 | HEIGHT: 64 IN | DIASTOLIC BLOOD PRESSURE: 86 MMHG | OXYGEN SATURATION: 97 % | HEART RATE: 94 BPM | WEIGHT: 144 LBS | RESPIRATION RATE: 16 BRPM

## 2024-08-12 DIAGNOSIS — Z12.31 ENCOUNTER FOR SCREENING MAMMOGRAM FOR MALIGNANT NEOPLASM OF BREAST: ICD-10-CM

## 2024-08-12 DIAGNOSIS — M47.812 CERVICAL SPONDYLOSIS: ICD-10-CM

## 2024-08-12 DIAGNOSIS — M85.80 OSTEOPENIA AFTER MENOPAUSE: ICD-10-CM

## 2024-08-12 DIAGNOSIS — E78.2 MIXED HYPERLIPIDEMIA: ICD-10-CM

## 2024-08-12 DIAGNOSIS — I48.0 PAROXYSMAL ATRIAL FIBRILLATION: ICD-10-CM

## 2024-08-12 DIAGNOSIS — Z00.00 MEDICARE ANNUAL WELLNESS VISIT, SUBSEQUENT: Primary | ICD-10-CM

## 2024-08-12 DIAGNOSIS — M79.7 FIBROMYALGIA: ICD-10-CM

## 2024-08-12 DIAGNOSIS — Z78.0 OSTEOPENIA AFTER MENOPAUSE: ICD-10-CM

## 2024-08-12 DIAGNOSIS — M21.372 FOOT DROP, LEFT: ICD-10-CM

## 2024-08-12 DIAGNOSIS — R79.9 ABNORMAL FINDING OF BLOOD CHEMISTRY, UNSPECIFIED: ICD-10-CM

## 2024-08-12 DIAGNOSIS — Z12.11 ENCOUNTER FOR SCREENING FOR MALIGNANT NEOPLASM OF COLON: ICD-10-CM

## 2024-08-12 DIAGNOSIS — I10 ESSENTIAL HYPERTENSION: ICD-10-CM

## 2024-08-12 DIAGNOSIS — M47.816 LUMBAR SPONDYLOSIS: ICD-10-CM

## 2024-08-12 PROBLEM — M16.11 PRIMARY OSTEOARTHRITIS OF RIGHT HIP: Status: RESOLVED | Noted: 2021-10-28 | Resolved: 2024-08-12

## 2024-08-12 PROBLEM — N62 MACROMASTIA: Status: RESOLVED | Noted: 2017-05-17 | Resolved: 2024-08-12

## 2024-08-12 PROBLEM — E55.9 VITAMIN D DEFICIENCY, UNSPECIFIED: Status: RESOLVED | Noted: 2022-06-03 | Resolved: 2024-08-12

## 2024-08-12 PROBLEM — M54.50 LUMBAR SPINE PAIN: Status: RESOLVED | Noted: 2021-08-04 | Resolved: 2024-08-12

## 2024-08-12 LAB
BASOPHILS # BLD AUTO: 0.06 10*3/MM3 (ref 0–0.2)
BASOPHILS NFR BLD AUTO: 0.7 % (ref 0–1.5)
DEPRECATED RDW RBC AUTO: 44 FL (ref 37–54)
EOSINOPHIL # BLD AUTO: 0.14 10*3/MM3 (ref 0–0.4)
EOSINOPHIL NFR BLD AUTO: 1.7 % (ref 0.3–6.2)
ERYTHROCYTE [DISTWIDTH] IN BLOOD BY AUTOMATED COUNT: 13.5 % (ref 12.3–15.4)
ERYTHROCYTE [SEDIMENTATION RATE] IN BLOOD: 8 MM/HR (ref 0–30)
HCT VFR BLD AUTO: 43.3 % (ref 34–46.6)
HGB BLD-MCNC: 14.2 G/DL (ref 12–15.9)
HOLD SPECIMEN: NORMAL
IMM GRANULOCYTES # BLD AUTO: 0.03 10*3/MM3 (ref 0–0.05)
IMM GRANULOCYTES NFR BLD AUTO: 0.4 % (ref 0–0.5)
LYMPHOCYTES # BLD AUTO: 2.57 10*3/MM3 (ref 0.7–3.1)
LYMPHOCYTES NFR BLD AUTO: 31.5 % (ref 19.6–45.3)
MCH RBC QN AUTO: 29.2 PG (ref 26.6–33)
MCHC RBC AUTO-ENTMCNC: 32.8 G/DL (ref 31.5–35.7)
MCV RBC AUTO: 89.1 FL (ref 79–97)
MONOCYTES # BLD AUTO: 0.62 10*3/MM3 (ref 0.1–0.9)
MONOCYTES NFR BLD AUTO: 7.6 % (ref 5–12)
NEUTROPHILS NFR BLD AUTO: 4.73 10*3/MM3 (ref 1.7–7)
NEUTROPHILS NFR BLD AUTO: 58.1 % (ref 42.7–76)
NRBC BLD AUTO-RTO: 0 /100 WBC (ref 0–0.2)
PLATELET # BLD AUTO: 314 10*3/MM3 (ref 140–450)
PMV BLD AUTO: 11.3 FL (ref 6–12)
RBC # BLD AUTO: 4.86 10*6/MM3 (ref 3.77–5.28)
WBC NRBC COR # BLD AUTO: 8.15 10*3/MM3 (ref 3.4–10.8)

## 2024-08-12 PROCEDURE — 87086 URINE CULTURE/COLONY COUNT: CPT | Performed by: FAMILY MEDICINE

## 2024-08-12 PROCEDURE — 87186 SC STD MICRODIL/AGAR DIL: CPT | Performed by: FAMILY MEDICINE

## 2024-08-12 PROCEDURE — 84443 ASSAY THYROID STIM HORMONE: CPT | Performed by: FAMILY MEDICINE

## 2024-08-12 PROCEDURE — 80053 COMPREHEN METABOLIC PANEL: CPT | Performed by: FAMILY MEDICINE

## 2024-08-12 PROCEDURE — 80061 LIPID PANEL: CPT | Performed by: FAMILY MEDICINE

## 2024-08-12 PROCEDURE — 82306 VITAMIN D 25 HYDROXY: CPT | Performed by: FAMILY MEDICINE

## 2024-08-12 PROCEDURE — 85652 RBC SED RATE AUTOMATED: CPT | Performed by: FAMILY MEDICINE

## 2024-08-12 PROCEDURE — 86140 C-REACTIVE PROTEIN: CPT | Performed by: FAMILY MEDICINE

## 2024-08-12 PROCEDURE — 84439 ASSAY OF FREE THYROXINE: CPT | Performed by: FAMILY MEDICINE

## 2024-08-12 PROCEDURE — 87077 CULTURE AEROBIC IDENTIFY: CPT | Performed by: FAMILY MEDICINE

## 2024-08-12 PROCEDURE — 36415 COLL VENOUS BLD VENIPUNCTURE: CPT | Performed by: FAMILY MEDICINE

## 2024-08-12 PROCEDURE — 85025 COMPLETE CBC W/AUTO DIFF WBC: CPT | Performed by: FAMILY MEDICINE

## 2024-08-12 PROCEDURE — 83036 HEMOGLOBIN GLYCOSYLATED A1C: CPT | Performed by: FAMILY MEDICINE

## 2024-08-12 PROCEDURE — 86038 ANTINUCLEAR ANTIBODIES: CPT | Performed by: FAMILY MEDICINE

## 2024-08-12 PROCEDURE — 82607 VITAMIN B-12: CPT | Performed by: FAMILY MEDICINE

## 2024-08-12 PROCEDURE — 81001 URINALYSIS AUTO W/SCOPE: CPT | Performed by: FAMILY MEDICINE
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
## 2024-08-13 PROBLEM — R79.9 ABNORMAL FINDING OF BLOOD CHEMISTRY, UNSPECIFIED: Status: ACTIVE | Noted: 2024-08-13

## 2024-08-13 PROBLEM — Z12.11 ENCOUNTER FOR SCREENING FOR MALIGNANT NEOPLASM OF COLON: Status: ACTIVE | Noted: 2024-08-13

## 2024-08-13 PROBLEM — M85.80 OSTEOPENIA AFTER MENOPAUSE: Status: ACTIVE | Noted: 2024-08-13

## 2024-08-13 PROBLEM — Z78.0 OSTEOPENIA AFTER MENOPAUSE: Status: ACTIVE | Noted: 2024-08-13

## 2024-08-13 LAB
25(OH)D3 SERPL-MCNC: 28.9 NG/ML (ref 30–100)
ALBUMIN SERPL-MCNC: 4.3 G/DL (ref 3.5–5.2)
ALBUMIN/GLOB SERPL: 1.7 G/DL
ALP SERPL-CCNC: 147 U/L (ref 39–117)
ALT SERPL W P-5'-P-CCNC: 91 U/L (ref 1–33)
ANION GAP SERPL CALCULATED.3IONS-SCNC: 9 MMOL/L (ref 5–15)
AST SERPL-CCNC: 53 U/L (ref 1–32)
BACTERIA UR QL AUTO: ABNORMAL /HPF
BILIRUB SERPL-MCNC: 0.2 MG/DL (ref 0–1.2)
BILIRUB UR QL STRIP: NEGATIVE
BUN SERPL-MCNC: 8 MG/DL (ref 8–23)
BUN/CREAT SERPL: 12.3 (ref 7–25)
CALCIUM SPEC-SCNC: 9.2 MG/DL (ref 8.6–10.5)
CHLORIDE SERPL-SCNC: 103 MMOL/L (ref 98–107)
CHOLEST SERPL-MCNC: 239 MG/DL (ref 0–200)
CLARITY UR: CLEAR
CO2 SERPL-SCNC: 28 MMOL/L (ref 22–29)
COLOR UR: YELLOW
CREAT SERPL-MCNC: 0.65 MG/DL (ref 0.57–1)
CRP SERPL-MCNC: <0.3 MG/DL (ref 0–0.5)
EGFRCR SERPLBLD CKD-EPI 2021: 95.4 ML/MIN/1.73
GLOBULIN UR ELPH-MCNC: 2.5 GM/DL
GLUCOSE SERPL-MCNC: 76 MG/DL (ref 65–99)
GLUCOSE UR STRIP-MCNC: NEGATIVE MG/DL
HBA1C MFR BLD: 5.6 % (ref 4.8–5.6)
HDLC SERPL-MCNC: 75 MG/DL (ref 40–60)
HGB UR QL STRIP.AUTO: NEGATIVE
HYALINE CASTS UR QL AUTO: ABNORMAL /LPF
KETONES UR QL STRIP: NEGATIVE
LDLC SERPL CALC-MCNC: 133 MG/DL (ref 0–100)
LDLC/HDLC SERPL: 1.71 {RATIO}
LEUKOCYTE ESTERASE UR QL STRIP.AUTO: ABNORMAL
NITRITE UR QL STRIP: NEGATIVE
PH UR STRIP.AUTO: 6 [PH] (ref 5–8)
POTASSIUM SERPL-SCNC: 4 MMOL/L (ref 3.5–5.2)
PROT SERPL-MCNC: 6.8 G/DL (ref 6–8.5)
PROT UR QL STRIP: NEGATIVE
RBC # UR STRIP: ABNORMAL /HPF
REF LAB TEST METHOD: ABNORMAL
SODIUM SERPL-SCNC: 140 MMOL/L (ref 136–145)
SP GR UR STRIP: 1.01 (ref 1–1.03)
SQUAMOUS #/AREA URNS HPF: ABNORMAL /HPF
T4 FREE SERPL-MCNC: 1.09 NG/DL (ref 0.92–1.68)
TRIGL SERPL-MCNC: 177 MG/DL (ref 0–150)
TSH SERPL DL<=0.05 MIU/L-ACNC: 1.29 UIU/ML (ref 0.27–4.2)
UROBILINOGEN UR QL STRIP: ABNORMAL
VIT B12 BLD-MCNC: 1142 PG/ML (ref 211–946)
VLDLC SERPL-MCNC: 31 MG/DL (ref 5–40)
WBC # UR STRIP: ABNORMAL /HPF

## 2024-08-13 RX ORDER — ERGOCALCIFEROL 1.25 MG/1
50000 CAPSULE ORAL
Qty: 12 CAPSULE | Refills: 1 | Status: SHIPPED | OUTPATIENT
Start: 2024-08-13 | End: 2024-10-30

## 2024-08-13 NOTE — PROGRESS NOTES
Left detailed voicemail for Katarina Phillips as per verbal release. Advised Katarina Phillips to call the office with any additional questions.

## 2024-08-13 NOTE — PROGRESS NOTES
Sammi tell Shelly that her labs are overall stable and do not explain her pain and discomfort.  She is taking rosuvastatin 20 mg.  I am going to ask her to stop that for 6 weeks to see if she notices any improvement at all in her muscle achiness.  Tell her to consider what she has been feeling the last couple weeks which she describes as horrible as a 10 on the pain scale and in 4 to 6 weeks after stopping the rosuvastatin I want her to give me a number on the achiness scale.  By then we will have all of the rest of her testing back.  I want her to stay as active as she can and try to get as much good sleep as she can.  When she runs out of the amitriptyline that she takes every night I want to switch her to trazodone to use for sleep but also to help with her pain.  Call me when we are about running out.  Your labs indicate your Vit D level is low.  We will call in capsules of the 50,000 units each and I would ask that you take one a week for the next 12 weeks.   After those 12 weeks are completed then  buy otc Vit D at 2000 units each  and take one daily.   Lets recheck the Vit D level in about 5-6 months..

## 2024-08-14 LAB — ANA SER QL: NEGATIVE

## 2024-08-15 LAB — BACTERIA SPEC AEROBE CULT: ABNORMAL

## 2024-08-20 ENCOUNTER — PATIENT MESSAGE (OUTPATIENT)
Dept: FAMILY MEDICINE CLINIC | Facility: CLINIC | Age: 69
End: 2024-08-20
Payer: MEDICARE

## 2024-08-31 DIAGNOSIS — M54.2 NECK PAIN, BILATERAL: ICD-10-CM

## 2024-08-31 DIAGNOSIS — M47.812 CERVICAL SPONDYLOSIS: ICD-10-CM

## 2024-08-31 DIAGNOSIS — G89.4 CHRONIC PAIN DISORDER: ICD-10-CM

## 2024-08-31 RX ORDER — HYDROCODONE BITARTRATE AND ACETAMINOPHEN 10; 325 MG/1; MG/1
1 TABLET ORAL EVERY 6 HOURS PRN
Qty: 120 TABLET | Refills: 0 | Status: SHIPPED | OUTPATIENT
Start: 2024-08-31

## 2024-09-02 RX ORDER — BUPROPION HYDROCHLORIDE 150 MG/1
150 TABLET, EXTENDED RELEASE ORAL 2 TIMES DAILY
Qty: 60 TABLET | Refills: 0 | Status: SHIPPED | OUTPATIENT
Start: 2024-09-02

## 2024-09-10 ENCOUNTER — HOSPITAL ENCOUNTER (OUTPATIENT)
Dept: BONE DENSITY | Facility: HOSPITAL | Age: 69
Discharge: HOME | End: 2024-09-10
Payer: MEDICARE

## 2024-09-10 ENCOUNTER — HOSPITAL ENCOUNTER (OUTPATIENT)
Dept: MAMMOGRAPHY | Facility: HOSPITAL | Age: 69
Discharge: HOME | End: 2024-09-10
Payer: MEDICARE

## 2024-09-10 DIAGNOSIS — I10 ESSENTIAL HYPERTENSION: ICD-10-CM

## 2024-09-10 DIAGNOSIS — Z78.0 OSTEOPENIA AFTER MENOPAUSE: ICD-10-CM

## 2024-09-10 DIAGNOSIS — M79.7 FIBROMYALGIA: ICD-10-CM

## 2024-09-10 DIAGNOSIS — Z12.31 ENCOUNTER FOR SCREENING MAMMOGRAM FOR MALIGNANT NEOPLASM OF BREAST: ICD-10-CM

## 2024-09-10 DIAGNOSIS — E78.2 MIXED HYPERLIPIDEMIA: ICD-10-CM

## 2024-09-10 DIAGNOSIS — I48.0 PAROXYSMAL ATRIAL FIBRILLATION: ICD-10-CM

## 2024-09-10 DIAGNOSIS — M85.80 OSTEOPENIA AFTER MENOPAUSE: ICD-10-CM

## 2024-09-10 DIAGNOSIS — Z00.00 MEDICARE ANNUAL WELLNESS VISIT, SUBSEQUENT: ICD-10-CM

## 2024-09-10 PROCEDURE — 77063 BREAST TOMOSYNTHESIS BI: CPT

## 2024-09-10 PROCEDURE — 77067 SCR MAMMO BI INCL CAD: CPT

## 2024-09-10 PROCEDURE — 77080 DXA BONE DENSITY AXIAL: CPT

## 2024-09-10 NOTE — PROGRESS NOTES
Tell Katarina to take Caltrate 600 with vitamin D 1 twice a day and lets see how she does over the next few years.  Lets repeat the DEXA scan in 2 years.  Stay active so the bones are stimulated to stay strong

## 2024-09-20 DIAGNOSIS — R19.5 POSITIVE COLORECTAL CANCER SCREENING USING COLOGUARD TEST: Primary | ICD-10-CM

## 2024-10-01 DIAGNOSIS — R00.2 PALPITATIONS: ICD-10-CM

## 2024-10-01 DIAGNOSIS — G89.4 CHRONIC PAIN DISORDER: ICD-10-CM

## 2024-10-01 DIAGNOSIS — I48.0 PAROXYSMAL ATRIAL FIBRILLATION: ICD-10-CM

## 2024-10-01 DIAGNOSIS — I10 ESSENTIAL HYPERTENSION: ICD-10-CM

## 2024-10-01 DIAGNOSIS — M47.812 CERVICAL SPONDYLOSIS: ICD-10-CM

## 2024-10-01 DIAGNOSIS — M54.2 NECK PAIN, BILATERAL: ICD-10-CM

## 2024-10-01 DIAGNOSIS — E78.2 MIXED HYPERLIPIDEMIA: ICD-10-CM

## 2024-10-01 RX ORDER — LOSARTAN POTASSIUM 100 MG/1
100 TABLET ORAL DAILY
Qty: 90 TABLET | Refills: 0 | Status: SHIPPED | OUTPATIENT
Start: 2024-10-01

## 2024-10-01 RX ORDER — HYDROCODONE BITARTRATE AND ACETAMINOPHEN 10; 325 MG/1; MG/1
1 TABLET ORAL EVERY 6 HOURS PRN
Qty: 120 TABLET | Refills: 0 | Status: SHIPPED | OUTPATIENT
Start: 2024-10-01

## 2024-10-24 ENCOUNTER — EXTERNAL PBMM DATA (OUTPATIENT)
Dept: PHARMACY | Facility: OTHER | Age: 69
End: 2024-10-24
Payer: MEDICARE

## 2024-11-02 DIAGNOSIS — G89.4 CHRONIC PAIN DISORDER: ICD-10-CM

## 2024-11-02 DIAGNOSIS — M47.812 CERVICAL SPONDYLOSIS: ICD-10-CM

## 2024-11-02 DIAGNOSIS — M54.2 NECK PAIN, BILATERAL: ICD-10-CM

## 2024-11-02 RX ORDER — HYDROCODONE BITARTRATE AND ACETAMINOPHEN 10; 325 MG/1; MG/1
1 TABLET ORAL EVERY 6 HOURS PRN
Qty: 120 TABLET | Refills: 0 | Status: SHIPPED | OUTPATIENT
Start: 2024-11-02

## 2024-11-02 RX ORDER — TRAZODONE HYDROCHLORIDE 50 MG/1
TABLET, FILM COATED ORAL
Qty: 60 TABLET | Refills: 1 | Status: SHIPPED | OUTPATIENT
Start: 2024-11-02

## 2024-11-20 ENCOUNTER — EXTERNAL PBMM DATA (OUTPATIENT)
Dept: PHARMACY | Facility: OTHER | Age: 69
End: 2024-11-20
Payer: MEDICARE

## 2024-11-23 ENCOUNTER — POP HEALTH PHARMACY (OUTPATIENT)
Dept: PHARMACY | Facility: OTHER | Age: 69
End: 2024-11-23
Payer: MEDICARE

## 2024-11-27 DIAGNOSIS — M47.812 CERVICAL SPONDYLOSIS: ICD-10-CM

## 2024-11-27 DIAGNOSIS — M54.2 NECK PAIN, BILATERAL: ICD-10-CM

## 2024-11-27 DIAGNOSIS — G89.4 CHRONIC PAIN DISORDER: ICD-10-CM

## 2024-11-27 RX ORDER — HYDROCODONE BITARTRATE AND ACETAMINOPHEN 10; 325 MG/1; MG/1
1 TABLET ORAL EVERY 6 HOURS PRN
Qty: 120 TABLET | Refills: 0 | Status: SHIPPED | OUTPATIENT
Start: 2024-11-27

## 2024-12-09 ENCOUNTER — POP HEALTH PHARMACY (OUTPATIENT)
Dept: PHARMACY | Facility: OTHER | Age: 69
End: 2024-12-09
Payer: MEDICARE

## 2024-12-26 DIAGNOSIS — E78.2 MIXED HYPERLIPIDEMIA: ICD-10-CM

## 2024-12-26 DIAGNOSIS — I48.0 PAROXYSMAL ATRIAL FIBRILLATION: ICD-10-CM

## 2024-12-26 DIAGNOSIS — I10 ESSENTIAL HYPERTENSION: ICD-10-CM

## 2024-12-26 DIAGNOSIS — R00.2 PALPITATIONS: ICD-10-CM

## 2024-12-26 RX ORDER — LOSARTAN POTASSIUM 100 MG/1
100 TABLET ORAL DAILY
Qty: 90 TABLET | Refills: 0 | Status: SHIPPED | OUTPATIENT
Start: 2024-12-26

## 2024-12-31 RX ORDER — TRAZODONE HYDROCHLORIDE 50 MG/1
150 TABLET, FILM COATED ORAL NIGHTLY
Qty: 270 TABLET | Refills: 2 | Status: SHIPPED | OUTPATIENT
Start: 2024-12-31

## 2025-01-03 DIAGNOSIS — M47.812 CERVICAL SPONDYLOSIS: ICD-10-CM

## 2025-01-03 DIAGNOSIS — M54.2 NECK PAIN, BILATERAL: ICD-10-CM

## 2025-01-03 DIAGNOSIS — G89.4 CHRONIC PAIN DISORDER: ICD-10-CM

## 2025-01-03 RX ORDER — HYDROCODONE BITARTRATE AND ACETAMINOPHEN 10; 325 MG/1; MG/1
1 TABLET ORAL EVERY 6 HOURS PRN
Qty: 120 TABLET | Refills: 0 | Status: SHIPPED | OUTPATIENT
Start: 2025-01-03

## 2025-01-10 DIAGNOSIS — I48.0 PAROXYSMAL ATRIAL FIBRILLATION: ICD-10-CM

## 2025-01-10 DIAGNOSIS — E78.2 MIXED HYPERLIPIDEMIA: ICD-10-CM

## 2025-01-10 DIAGNOSIS — I10 ESSENTIAL HYPERTENSION: ICD-10-CM

## 2025-01-10 RX ORDER — FERROUS GLUCONATE 324(38)MG
TABLET ORAL
Qty: 50 TABLET | Refills: 0 | Status: SHIPPED | OUTPATIENT
Start: 2025-01-10

## 2025-01-10 RX ORDER — DILTIAZEM HYDROCHLORIDE 120 MG/1
120 CAPSULE, COATED, EXTENDED RELEASE ORAL DAILY
Qty: 90 CAPSULE | Refills: 0 | Status: SHIPPED | OUTPATIENT
Start: 2025-01-10

## 2025-02-04 DIAGNOSIS — G89.4 CHRONIC PAIN DISORDER: ICD-10-CM

## 2025-02-04 DIAGNOSIS — M47.812 CERVICAL SPONDYLOSIS: ICD-10-CM

## 2025-02-04 DIAGNOSIS — M54.2 NECK PAIN, BILATERAL: ICD-10-CM

## 2025-02-04 RX ORDER — HYDROCODONE BITARTRATE AND ACETAMINOPHEN 10; 325 MG/1; MG/1
1 TABLET ORAL EVERY 6 HOURS PRN
Qty: 120 TABLET | Refills: 0 | Status: SHIPPED | OUTPATIENT
Start: 2025-02-04

## 2025-02-13 DIAGNOSIS — I10 ESSENTIAL HYPERTENSION: ICD-10-CM

## 2025-02-13 DIAGNOSIS — I48.0 PAROXYSMAL ATRIAL FIBRILLATION: ICD-10-CM

## 2025-02-13 DIAGNOSIS — E78.2 MIXED HYPERLIPIDEMIA: ICD-10-CM

## 2025-02-13 RX ORDER — BUPROPION HYDROCHLORIDE 150 MG/1
150 TABLET, EXTENDED RELEASE ORAL 2 TIMES DAILY
Qty: 60 TABLET | Refills: 0 | Status: SHIPPED | OUTPATIENT
Start: 2025-02-13

## 2025-02-13 RX ORDER — DILTIAZEM HYDROCHLORIDE 120 MG/1
120 CAPSULE, COATED, EXTENDED RELEASE ORAL DAILY
Qty: 90 CAPSULE | Refills: 0 | Status: SHIPPED | OUTPATIENT
Start: 2025-02-13

## 2025-02-21 DIAGNOSIS — I10 ESSENTIAL HYPERTENSION: ICD-10-CM

## 2025-02-21 DIAGNOSIS — E78.2 MIXED HYPERLIPIDEMIA: ICD-10-CM

## 2025-02-21 DIAGNOSIS — I48.0 PAROXYSMAL ATRIAL FIBRILLATION: ICD-10-CM

## 2025-02-21 RX ORDER — DILTIAZEM HYDROCHLORIDE 120 MG/1
120 CAPSULE, COATED, EXTENDED RELEASE ORAL DAILY
Qty: 90 CAPSULE | Refills: 0 | Status: SHIPPED | OUTPATIENT
Start: 2025-02-21

## 2025-03-03 DIAGNOSIS — M54.2 NECK PAIN, BILATERAL: ICD-10-CM

## 2025-03-03 DIAGNOSIS — G89.4 CHRONIC PAIN DISORDER: ICD-10-CM

## 2025-03-03 DIAGNOSIS — M47.812 CERVICAL SPONDYLOSIS: ICD-10-CM

## 2025-03-03 RX ORDER — HYDROCODONE BITARTRATE AND ACETAMINOPHEN 10; 325 MG/1; MG/1
1 TABLET ORAL EVERY 6 HOURS PRN
Qty: 120 TABLET | Refills: 0 | Status: SHIPPED | OUTPATIENT
Start: 2025-03-03

## 2025-03-20 RX ORDER — HYDROGEN PEROXIDE 2.65 ML/100ML
81 LIQUID ORAL; TOPICAL DAILY
Qty: 20 TABLET | Refills: 0 | Status: SHIPPED | OUTPATIENT
Start: 2025-03-20

## 2025-04-04 DIAGNOSIS — M47.812 CERVICAL SPONDYLOSIS: ICD-10-CM

## 2025-04-04 DIAGNOSIS — M54.2 NECK PAIN, BILATERAL: ICD-10-CM

## 2025-04-04 DIAGNOSIS — G89.4 CHRONIC PAIN DISORDER: ICD-10-CM

## 2025-04-04 RX ORDER — HYDROCODONE BITARTRATE AND ACETAMINOPHEN 10; 325 MG/1; MG/1
1 TABLET ORAL EVERY 6 HOURS PRN
Qty: 120 TABLET | Refills: 0 | Status: SHIPPED | OUTPATIENT
Start: 2025-04-04

## 2025-05-02 ENCOUNTER — LAB (OUTPATIENT)
Dept: FAMILY MEDICINE CLINIC | Facility: CLINIC | Age: 70
End: 2025-05-02
Payer: MEDICARE

## 2025-05-02 ENCOUNTER — OFFICE VISIT (OUTPATIENT)
Dept: FAMILY MEDICINE CLINIC | Facility: CLINIC | Age: 70
End: 2025-05-02
Payer: MEDICARE

## 2025-05-02 VITALS
HEIGHT: 64 IN | OXYGEN SATURATION: 97 % | RESPIRATION RATE: 16 BRPM | HEART RATE: 70 BPM | SYSTOLIC BLOOD PRESSURE: 136 MMHG | BODY MASS INDEX: 22.71 KG/M2 | DIASTOLIC BLOOD PRESSURE: 82 MMHG | WEIGHT: 133 LBS

## 2025-05-02 DIAGNOSIS — J34.2 ACQUIRED DEVIATED NASAL SEPTUM: ICD-10-CM

## 2025-05-02 DIAGNOSIS — G89.4 CHRONIC PAIN DISORDER: Primary | ICD-10-CM

## 2025-05-02 DIAGNOSIS — E78.2 MIXED HYPERLIPIDEMIA: ICD-10-CM

## 2025-05-02 DIAGNOSIS — M47.812 CERVICAL SPONDYLOSIS: ICD-10-CM

## 2025-05-02 DIAGNOSIS — M79.7 FIBROMYALGIA: ICD-10-CM

## 2025-05-02 DIAGNOSIS — I10 ESSENTIAL HYPERTENSION: ICD-10-CM

## 2025-05-02 DIAGNOSIS — G89.4 CHRONIC PAIN DISORDER: ICD-10-CM

## 2025-05-02 PROCEDURE — 80307 DRUG TEST PRSMV CHEM ANLYZR: CPT | Performed by: FAMILY MEDICINE

## 2025-05-02 PROCEDURE — G0480 DRUG TEST DEF 1-7 CLASSES: HCPCS | Performed by: FAMILY MEDICINE

## 2025-05-02 RX ORDER — BUPROPION HYDROCHLORIDE 150 MG/1
150 TABLET, EXTENDED RELEASE ORAL 2 TIMES DAILY
Qty: 180 TABLET | Refills: 1 | Status: SHIPPED | OUTPATIENT
Start: 2025-05-02

## 2025-05-02 RX ORDER — HYDROCODONE BITARTRATE AND ACETAMINOPHEN 10; 325 MG/1; MG/1
1 TABLET ORAL EVERY 6 HOURS PRN
Qty: 120 TABLET | Refills: 0 | Status: SHIPPED | OUTPATIENT
Start: 2025-05-02

## 2025-05-02 NOTE — PROGRESS NOTES
"Chief Complaint  Pain (Due to urine drug screen and patient agreement contract today), Hyperlipidemia, Hypertension, Atrial Fibrillation, and Fibromyalgia    Subjective        Katarina Phillips presents to [facility]  History of Present Illness  The patient presents for evaluation of chronic pain disorder, cervical spondylosis, fibromyalgia, mixed hyperlipidemia, hypertension, and nasal septum deviation.    A polyp or lump in the nose is reported, which has been present for several months and results in frequent nosebleeds. Visualization is possible using a flashlight, and the issue was first noticed during an episode of sniffles that led to bleeding. No nasal trauma is reported.    Constant pain is experienced, which impedes the ability to stand or walk. Weakness in the arms and neck pain radiating into the extremities are also reported. A recent fall in the shower due to inability to  the bar resulted in bruising on the back and side. Pain management includes hydrocodone taken on a limited but scheduled basis, supplemented with physical therapy and heat application to the neck. Sleeping medication is occasionally required when pain disrupts sleep.    A severe episode of atrial fibrillation occurred approximately one month ago, accompanied by chest pain, for which nitroglycerin was taken. The cardiologist, Dr. Krishnan, discontinued anticoagulant therapy due to stable condition. An EKG performed during the last visit showed normal results, and aspirin is advised. The possibility of a Watchman device was not discussed. A follow-up appointment with the cardiologist is scheduled for 11/2025.    Dark spots on the skin are attributed to aging.    Rosuvastatin 20 mg is taken for elevated lipid panel.    Blood pressure is 136/82.       Objective   Vital Signs:  /82   Pulse 70   Resp 16   Ht 162.6 cm (64\")   Wt 60.3 kg (133 lb)   SpO2 97%   BMI 22.83 kg/m²   Estimated body " "mass index is 22.83 kg/m² as calculated from the following:    Height as of this encounter: 162.6 cm (64\").    Weight as of this encounter: 60.3 kg (133 lb).       BMI is within normal parameters. No other follow-up for BMI required.      Physical Exam  Constitutional:       Appearance: Normal appearance. She is well-developed and normal weight.   HENT:      Head: Normocephalic and atraumatic.      Right Ear: Tympanic membrane, ear canal and external ear normal.      Left Ear: Tympanic membrane, ear canal and external ear normal.      Nose: Nose normal.      Comments: Nasal septum is deviated into the right nasal canal.  On the right the nasal septum bulges into the canal.  On the left there is a concave deformity to the nasal septum.     Mouth/Throat:      Mouth: Mucous membranes are moist.      Pharynx: Oropharynx is clear. No oropharyngeal exudate.   Eyes:      Extraocular Movements: Extraocular movements intact.      Conjunctiva/sclera: Conjunctivae normal.      Pupils: Pupils are equal, round, and reactive to light.   Cardiovascular:      Rate and Rhythm: Normal rate and regular rhythm.      Pulses: Normal pulses.      Heart sounds: Normal heart sounds.   Pulmonary:      Effort: Pulmonary effort is normal.      Breath sounds: Normal breath sounds.   Abdominal:      General: Bowel sounds are normal.      Palpations: Abdomen is soft.   Musculoskeletal:         General: Normal range of motion.      Cervical back: Normal range of motion and neck supple.   Skin:     General: Skin is warm and dry.   Neurological:      General: No focal deficit present.      Mental Status: She is alert and oriented to person, place, and time. Mental status is at baseline.   Psychiatric:         Mood and Affect: Mood normal.         Behavior: Behavior normal.         Thought Content: Thought content normal.         Judgment: Judgment normal.        Result Review :          Results                  Assessment & Plan  1. Chronic pain " disorder.  She has chronic pain mainly in her spine, particularly in her neck, with occasional radiculopathy. The pain extends to her thoracic and lumbar areas and sometimes radiates to her shoulders, elbows, and hands. Currently, she experiences stiffness and limited range of motion in her neck.  She uses heat, performs stretches, and takes hydrocodone on a limited but scheduled basis to manage the pain.  This treatment will continue indefinitely. Physical therapy and pain management will be considered if the pain worsens.    2. Cervical spondylosis.  Her last x-ray showed significant arthritis in the neck and facet arthropathy.  She will continue with pain medication and therapy as needed.  The condition will be monitored through follow-up visits and imaging as necessary.    3. Fibromyalgia.  She has muscular pain with trigger points in her traps, paraspinal muscles from her occiput down to her sacrum, anserine bursa, trochanteric bursa, and costochondral joint areas.  Her symptoms align with fibromyalgia. The current medication regimen aims to alleviate these symptoms.  Continued use of prescribed medications and monitoring of symptom progression will be necessary.    4. Mixed hyperlipidemia.  Her lipid panel has been elevated.  She is currently on rosuvastatin 20 mg. Lipid panel will be repeated every 6 to 12 months depending on her progress.  In the meantime, she should continue the current therapy.    5. Hypertension.  Her blood pressure is 136/82 mmHg.  She will continue with the current medications.  Regular monitoring of blood pressure and medication effectiveness will be conducted.    6. Nasal septum deviation.  The nasal septum appears to have deviated into the right nares. Externally, it does not appear displaced, but internally, there is a divot in the nasal septum on the left with a bulge in the right nasal canal.  A referral to Dr. Lawrence will be made for further evaluation and potential repositioning  of the septum.  Further assessment will be conducted by the ENT specialist to determine the appropriate intervention.            Follow Up     Return in about 4 months (around 9/2/2025), or if symptoms worsen or fail to improve, for Chronic Pain Management.  Patient was given instructions and counseling regarding her condition or for health maintenance advice. Please see specific information pulled into the AVS if appropriate.     Patient or patient representative verbalized consent for the use of Ambient Listening during the visit with  Andrews Ramírez MD for chart documentation. 5/2/2025  09:35 EDT

## 2025-05-06 LAB
AMPHETAMINES UR QL SCN: NEGATIVE NG/ML
BARBITURATES UR QL SCN: NEGATIVE NG/ML
BENZODIAZ UR QL SCN: NEGATIVE NG/ML
BZE UR QL SCN: NEGATIVE NG/ML
CANNABINOIDS UR QL SCN: NEGATIVE NG/ML
CREAT UR-MCNC: 66.4 MG/DL (ref 20–300)
FENTANYL UR-MCNC: NEGATIVE PG/ML
LABORATORY COMMENT REPORT: ABNORMAL
MEPERIDINE UR QL: NEGATIVE NG/ML
METHADONE UR QL SCN: NEGATIVE NG/ML
OPIATES UR QL SCN: POSITIVE NG/ML
OXYCODONE+OXYMORPHONE UR QL SCN: NEGATIVE NG/ML
PCP UR QL: NEGATIVE NG/ML
PH UR: 6 [PH] (ref 4.5–8.9)
PROPOXYPH UR QL SCN: NEGATIVE NG/ML
SP GR UR: 1.01
TRAMADOL UR QL SCN: NEGATIVE NG/ML

## 2025-05-22 RX ORDER — SEMAGLUTIDE 2.68 MG/ML
INJECTION, SOLUTION SUBCUTANEOUS
Qty: 3 ML | Refills: 0 | Status: SHIPPED | OUTPATIENT
Start: 2025-05-22

## 2025-06-02 DIAGNOSIS — M47.812 CERVICAL SPONDYLOSIS: ICD-10-CM

## 2025-06-02 DIAGNOSIS — G89.4 CHRONIC PAIN DISORDER: ICD-10-CM

## 2025-06-02 RX ORDER — HYDROCODONE BITARTRATE AND ACETAMINOPHEN 10; 325 MG/1; MG/1
1 TABLET ORAL EVERY 6 HOURS PRN
Qty: 120 TABLET | Refills: 0 | Status: SHIPPED | OUTPATIENT
Start: 2025-06-02

## 2025-06-06 RX ORDER — TRAZODONE HYDROCHLORIDE 50 MG/1
TABLET ORAL
Qty: 270 TABLET | Refills: 0 | Status: SHIPPED | OUTPATIENT
Start: 2025-06-06

## 2025-06-17 ENCOUNTER — EXTERNAL PBMM DATA (OUTPATIENT)
Dept: PHARMACY | Facility: OTHER | Age: 70
End: 2025-06-17
Payer: MEDICARE

## 2025-06-27 ENCOUNTER — EXTERNAL PBMM DATA (OUTPATIENT)
Dept: PHARMACY | Facility: OTHER | Age: 70
End: 2025-06-27
Payer: MEDICARE

## 2025-07-06 DIAGNOSIS — M47.812 CERVICAL SPONDYLOSIS: ICD-10-CM

## 2025-07-06 DIAGNOSIS — G89.4 CHRONIC PAIN DISORDER: ICD-10-CM

## 2025-07-06 RX ORDER — HYDROCODONE BITARTRATE AND ACETAMINOPHEN 10; 325 MG/1; MG/1
1 TABLET ORAL EVERY 6 HOURS PRN
Qty: 120 TABLET | Refills: 0 | Status: SHIPPED | OUTPATIENT
Start: 2025-07-06

## 2025-07-28 DIAGNOSIS — I48.0 PAROXYSMAL ATRIAL FIBRILLATION: ICD-10-CM

## 2025-07-28 DIAGNOSIS — I10 ESSENTIAL HYPERTENSION: ICD-10-CM

## 2025-07-28 DIAGNOSIS — E78.2 MIXED HYPERLIPIDEMIA: ICD-10-CM

## 2025-07-28 DIAGNOSIS — R00.2 PALPITATIONS: ICD-10-CM

## 2025-07-28 RX ORDER — LOSARTAN POTASSIUM 100 MG/1
100 TABLET ORAL DAILY
Qty: 90 TABLET | Refills: 0 | Status: SHIPPED | OUTPATIENT
Start: 2025-07-28

## 2025-07-30 ENCOUNTER — OFFICE VISIT (OUTPATIENT)
Dept: CARDIOLOGY | Facility: CLINIC | Age: 70
End: 2025-07-30
Payer: MEDICARE

## 2025-07-30 VITALS
WEIGHT: 129 LBS | RESPIRATION RATE: 16 BRPM | HEART RATE: 70 BPM | DIASTOLIC BLOOD PRESSURE: 82 MMHG | OXYGEN SATURATION: 97 % | BODY MASS INDEX: 22.02 KG/M2 | SYSTOLIC BLOOD PRESSURE: 148 MMHG | HEIGHT: 64 IN

## 2025-07-30 DIAGNOSIS — I25.118 CORONARY ARTERY DISEASE OF NATIVE ARTERY OF NATIVE HEART WITH STABLE ANGINA PECTORIS: ICD-10-CM

## 2025-07-30 DIAGNOSIS — R07.9 CHEST PAIN, UNSPECIFIED TYPE: Primary | ICD-10-CM

## 2025-07-30 PROCEDURE — 1160F RVW MEDS BY RX/DR IN RCRD: CPT | Performed by: NURSE PRACTITIONER

## 2025-07-30 PROCEDURE — 3079F DIAST BP 80-89 MM HG: CPT | Performed by: NURSE PRACTITIONER

## 2025-07-30 PROCEDURE — 99214 OFFICE O/P EST MOD 30 MIN: CPT | Performed by: NURSE PRACTITIONER

## 2025-07-30 PROCEDURE — 1159F MED LIST DOCD IN RCRD: CPT | Performed by: NURSE PRACTITIONER

## 2025-07-30 PROCEDURE — 3077F SYST BP >= 140 MM HG: CPT | Performed by: NURSE PRACTITIONER

## 2025-07-30 PROCEDURE — 93000 ELECTROCARDIOGRAM COMPLETE: CPT | Performed by: NURSE PRACTITIONER

## 2025-07-30 RX ORDER — LYSINE HCL 500 MG
TABLET ORAL DAILY
COMMUNITY
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
## 2025-08-06 DIAGNOSIS — G89.4 CHRONIC PAIN DISORDER: ICD-10-CM

## 2025-08-06 DIAGNOSIS — M47.812 CERVICAL SPONDYLOSIS: ICD-10-CM

## 2025-08-06 RX ORDER — HYDROCODONE BITARTRATE AND ACETAMINOPHEN 10; 325 MG/1; MG/1
.5-1 TABLET ORAL EVERY 6 HOURS PRN
Qty: 105 TABLET | Refills: 0 | Status: SHIPPED | OUTPATIENT
Start: 2025-08-06

## 2025-08-19 ENCOUNTER — HOSPITAL ENCOUNTER (OUTPATIENT)
Dept: NUCLEAR MEDICINE | Facility: HOSPITAL | Age: 70
Discharge: HOME | End: 2025-08-19
Payer: MEDICARE

## 2025-08-19 ENCOUNTER — HOSPITAL ENCOUNTER (OUTPATIENT)
Dept: CARDIOLOGY | Facility: HOSPITAL | Age: 70
Discharge: HOME | End: 2025-08-19
Payer: MEDICARE

## 2025-08-19 DIAGNOSIS — I25.118 CORONARY ARTERY DISEASE OF NATIVE ARTERY OF NATIVE HEART WITH STABLE ANGINA PECTORIS: ICD-10-CM

## 2025-08-19 DIAGNOSIS — R07.9 CHEST PAIN, UNSPECIFIED TYPE: ICD-10-CM

## 2025-08-19 LAB
AORTIC DIMENSIONLESS INDEX: 0.66 (DI)
AV MEAN PRESS GRAD SYS DOP V1V2: 3 MMHG
AV VMAX SYS DOP: 124 CM/SEC
BH CV ECHO LEFT VENTRICLE GLOBAL LONGITUDINAL STRAIN: -16.4 %
BH CV ECHO MEAS - AO MAX PG: 6.2 MMHG
BH CV ECHO MEAS - AO V2 VTI: 31.7 CM
BH CV ECHO MEAS - AVA(I,D): 1.67 CM2
BH CV ECHO MEAS - EDV(CUBED): 68.9 ML
BH CV ECHO MEAS - EDV(MOD-SP4): 59.7 ML
BH CV ECHO MEAS - EF(MOD-SP4): 62.1 %
BH CV ECHO MEAS - ESV(CUBED): 22 ML
BH CV ECHO MEAS - ESV(MOD-SP4): 22.6 ML
BH CV ECHO MEAS - FS: 31.7 %
BH CV ECHO MEAS - IVS/LVPW: 0.89 CM
BH CV ECHO MEAS - IVSD: 0.8 CM
BH CV ECHO MEAS - LA DIMENSION: 3.7 CM
BH CV ECHO MEAS - LAT PEAK E' VEL: 7.5 CM/SEC
BH CV ECHO MEAS - LV DIASTOLIC VOL/BSA (35-75): 36.8 CM2
BH CV ECHO MEAS - LV MASS(C)D: 105.6 GRAMS
BH CV ECHO MEAS - LV MAX PG: 3.2 MMHG
BH CV ECHO MEAS - LV MEAN PG: 2 MMHG
BH CV ECHO MEAS - LV SYSTOLIC VOL/BSA (12-30): 13.9 CM2
BH CV ECHO MEAS - LV V1 MAX: 89.1 CM/SEC
BH CV ECHO MEAS - LV V1 VTI: 20.8 CM
BH CV ECHO MEAS - LVIDD: 4.1 CM
BH CV ECHO MEAS - LVIDS: 2.8 CM
BH CV ECHO MEAS - LVOT AREA: 2.5 CM2
BH CV ECHO MEAS - LVOT DIAM: 1.8 CM
BH CV ECHO MEAS - LVPWD: 0.9 CM
BH CV ECHO MEAS - MED PEAK E' VEL: 7.3 CM/SEC
BH CV ECHO MEAS - MR MAX PG: 85.7 MMHG
BH CV ECHO MEAS - MR MAX VEL: 463 CM/SEC
BH CV ECHO MEAS - MV A MAX VEL: 63.4 CM/SEC
BH CV ECHO MEAS - MV DEC SLOPE: 342 CM/SEC2
BH CV ECHO MEAS - MV DEC TIME: 0.16 SEC
BH CV ECHO MEAS - MV E MAX VEL: 103 CM/SEC
BH CV ECHO MEAS - MV E/A: 1.62
BH CV ECHO MEAS - MV MAX PG: 9.7 MMHG
BH CV ECHO MEAS - MV MEAN PG: 3 MMHG
BH CV ECHO MEAS - MV P1/2T: 131.9 MSEC
BH CV ECHO MEAS - MV V2 VTI: 40.4 CM
BH CV ECHO MEAS - MVA(P1/2T): 1.67 CM2
BH CV ECHO MEAS - MVA(VTI): 1.31 CM2
BH CV ECHO MEAS - PA ACC TIME: 0.14 SEC
BH CV ECHO MEAS - PA V2 MAX: 89.8 CM/SEC
BH CV ECHO MEAS - RAP SYSTOLE: 3 MMHG
BH CV ECHO MEAS - RV MAX PG: 2.3 MMHG
BH CV ECHO MEAS - RV V1 MAX: 75.8 CM/SEC
BH CV ECHO MEAS - RV V1 VTI: 17.7 CM
BH CV ECHO MEAS - RVDD: 4 CM
BH CV ECHO MEAS - RVSP: 27.6 MMHG
BH CV ECHO MEAS - SV(LVOT): 52.9 ML
BH CV ECHO MEAS - SV(MOD-SP4): 37.1 ML
BH CV ECHO MEAS - SVI(LVOT): 32.6 ML/M2
BH CV ECHO MEAS - SVI(MOD-SP4): 22.9 ML/M2
BH CV ECHO MEAS - TAPSE (>1.6): 3 CM
BH CV ECHO MEAS - TR MAX PG: 24.6 MMHG
BH CV ECHO MEAS - TR MAX VEL: 248 CM/SEC
BH CV ECHO MEASUREMENTS AVERAGE E/E' RATIO: 13.92
BH CV REST NUCLEAR ISOTOPE DOSE: 11 MCI
BH CV STRESS BP STAGE 1: NORMAL
BH CV STRESS BP STAGE 2: NORMAL
BH CV STRESS COMMENTS STAGE 1: NORMAL
BH CV STRESS COMMENTS STAGE 2: NORMAL
BH CV STRESS DOSE REGADENOSON STAGE 1: 0.4
BH CV STRESS DURATION MIN STAGE 1: 0
BH CV STRESS DURATION MIN STAGE 2: 4
BH CV STRESS DURATION SEC STAGE 1: 10
BH CV STRESS DURATION SEC STAGE 2: 0
BH CV STRESS HR STAGE 1: 57
BH CV STRESS HR STAGE 2: 77
BH CV STRESS NUCLEAR ISOTOPE DOSE: 33 MCI
BH CV STRESS PROTOCOL 1: NORMAL
BH CV STRESS RECOVERY BP: NORMAL MMHG
BH CV STRESS RECOVERY HR: 86 BPM
BH CV STRESS STAGE 1: 1
BH CV STRESS STAGE 2: 2
BH CV XLRA - TDI S': 12.6 CM/SEC
MAXIMAL PREDICTED HEART RATE: 150 BPM
PERCENT MAX PREDICTED HR: 58 %
SINUS: 2.9 CM
SPECT HRT GATED+EF W RNC IV: 75 %
STJ: 2.3 CM
STRESS BASELINE BP: NORMAL MMHG
STRESS BASELINE HR: 57 BPM
STRESS PERCENT HR: 68 %
STRESS POST PEAK BP: NORMAL MMHG
STRESS POST PEAK HR: 87 BPM
STRESS TARGET HR: 128 BPM

## 2025-08-19 PROCEDURE — 93017 CV STRESS TEST TRACING ONLY: CPT

## 2025-08-19 PROCEDURE — 78452 HT MUSCLE IMAGE SPECT MULT: CPT

## 2025-08-19 PROCEDURE — 93306 TTE W/DOPPLER COMPLETE: CPT

## 2025-08-19 PROCEDURE — 93356 MYOCRD STRAIN IMG SPCKL TRCK: CPT

## 2025-08-19 PROCEDURE — 34310000005 TECHNETIUM TETROFOSMIN KIT: Performed by: NURSE PRACTITIONER

## 2025-08-19 PROCEDURE — A9502 TC99M TETROFOSMIN: HCPCS | Performed by: NURSE PRACTITIONER

## 2025-08-19 PROCEDURE — 25010000002 REGADENOSON 0.4 MG/5ML SOLUTION: Performed by: NURSE PRACTITIONER

## 2025-08-19 RX ORDER — REGADENOSON 0.08 MG/ML
0.4 INJECTION, SOLUTION INTRAVENOUS
Status: COMPLETED | OUTPATIENT
Start: 2025-08-19 | End: 2025-08-19

## 2025-08-19 RX ADMIN — TETROFOSMIN 1 DOSE: 1.38 INJECTION, POWDER, LYOPHILIZED, FOR SOLUTION INTRAVENOUS at 09:26

## 2025-08-19 RX ADMIN — TETROFOSMIN 1 DOSE: 1.38 INJECTION, POWDER, LYOPHILIZED, FOR SOLUTION INTRAVENOUS at 07:28

## 2025-08-19 RX ADMIN — REGADENOSON 0.4 MG: 0.08 INJECTION, SOLUTION INTRAVENOUS at 09:26

## (undated) DEVICE — ST ACC MICROPUNCTURE STFF/CANN PLAT/TP 4F 21G 40CM

## (undated) DEVICE — PINNACLE INTRODUCER SHEATH: Brand: PINNACLE

## (undated) DEVICE — ELECTRD DEFIB M/FUNC PROPADZ RADIOL 2PK

## (undated) DEVICE — CATH ABL ARCTIC FRNT ADV 10.5F3.5X28MM

## (undated) DEVICE — PROVE COVER: Brand: UNBRANDED

## (undated) DEVICE — SHEATH FLXCATH STEER 12FR

## (undated) DEVICE — PAD E/S GRND SGL/FOIL 9FT/CORD DISP

## (undated) DEVICE — RADIFOCUS OBTURATOR: Brand: RADIFOCUS

## (undated) DEVICE — CATH DIAG IMPULSE FR4 6F 100CM

## (undated) DEVICE — ST INTRO PERFORMER W/GW J/TP .038IN 14FR

## (undated) DEVICE — Device: Brand: CARTO 3

## (undated) DEVICE — CABL CATH ABLAT ACHIEVE 196CM 1P/U

## (undated) DEVICE — CATH ABL ACHIEVE MP 3.3F20MM 165CM

## (undated) DEVICE — Device: Brand: THERMOCOOL SMARTTOUCH SF

## (undated) DEVICE — Device: Brand: REFERENCE PATCH CARTO 3

## (undated) DEVICE — Device: Brand: SOUNDSTAR

## (undated) DEVICE — CATH DIAG IMPULSE PIG .056 6F 110CM

## (undated) DEVICE — Device: Brand: WEBSTER CS

## (undated) DEVICE — TBG PRESS/MONITOR FIX M/F LL A/ 24IN STRL

## (undated) DEVICE — Device: Brand: SMARTABLATE

## (undated) DEVICE — Device: Brand: RFP-100A CONNECTOR CABLE

## (undated) DEVICE — DISPOSABLE FEMORAL CEMENT                                    COMPRESSOR CAP STERILE

## (undated) DEVICE — GW XCHG AMPLTZ XSTIF PTFE CRV .035IN 3X180CM

## (undated) DEVICE — TBG IV DRIP CHAMBER MACRO SGL 72IN

## (undated) DEVICE — Device: Brand: WEBSTER

## (undated) DEVICE — CABL CONN CATH EP COAXL UMB 72IN

## (undated) DEVICE — CABL CONN CATH EP UMB 48IN

## (undated) DEVICE — PK TRY HEART CATH 50

## (undated) DEVICE — GW PTFE EMERALD HEPCOAT FC J TIP STD .035 3MM 150CM

## (undated) DEVICE — Device: Brand: NRG TRANSSEPTAL NEEDLE

## (undated) DEVICE — PREF.GUIDING SHEATH W/MULT.CRV: Brand: PREFACE

## (undated) DEVICE — CATH DIAG IMPULSE FL4 6F 100CM